# Patient Record
Sex: MALE | Race: WHITE | NOT HISPANIC OR LATINO | URBAN - METROPOLITAN AREA
[De-identification: names, ages, dates, MRNs, and addresses within clinical notes are randomized per-mention and may not be internally consistent; named-entity substitution may affect disease eponyms.]

---

## 2023-12-04 ENCOUNTER — EMERGENCY (EMERGENCY)
Facility: HOSPITAL | Age: 38
LOS: 1 days | Discharge: HOME | End: 2023-12-04
Attending: EMERGENCY MEDICINE | Admitting: EMERGENCY MEDICINE
Payer: COMMERCIAL

## 2023-12-04 VITALS
TEMPERATURE: 99 F | HEART RATE: 101 BPM | SYSTOLIC BLOOD PRESSURE: 131 MMHG | HEIGHT: 75 IN | OXYGEN SATURATION: 97 % | DIASTOLIC BLOOD PRESSURE: 90 MMHG | RESPIRATION RATE: 19 BRPM | WEIGHT: 225.09 LBS

## 2023-12-04 VITALS
DIASTOLIC BLOOD PRESSURE: 77 MMHG | SYSTOLIC BLOOD PRESSURE: 144 MMHG | OXYGEN SATURATION: 98 % | RESPIRATION RATE: 18 BRPM | TEMPERATURE: 98 F | HEART RATE: 77 BPM

## 2023-12-04 DIAGNOSIS — F43.22 ADJUSTMENT DISORDER WITH ANXIETY: ICD-10-CM

## 2023-12-04 LAB
ALBUMIN SERPL ELPH-MCNC: 4.2 G/DL — SIGNIFICANT CHANGE UP (ref 3.3–5)
ALBUMIN SERPL ELPH-MCNC: 4.2 G/DL — SIGNIFICANT CHANGE UP (ref 3.3–5)
ALP SERPL-CCNC: 95 U/L — SIGNIFICANT CHANGE UP (ref 30–120)
ALP SERPL-CCNC: 95 U/L — SIGNIFICANT CHANGE UP (ref 30–120)
ALT FLD-CCNC: 26 U/L — SIGNIFICANT CHANGE UP (ref 10–60)
ALT FLD-CCNC: 26 U/L — SIGNIFICANT CHANGE UP (ref 10–60)
AMPHET UR-MCNC: NEGATIVE — SIGNIFICANT CHANGE UP
AMPHET UR-MCNC: NEGATIVE — SIGNIFICANT CHANGE UP
ANION GAP SERPL CALC-SCNC: 10 MMOL/L — SIGNIFICANT CHANGE UP (ref 5–17)
ANION GAP SERPL CALC-SCNC: 10 MMOL/L — SIGNIFICANT CHANGE UP (ref 5–17)
AST SERPL-CCNC: 13 U/L — SIGNIFICANT CHANGE UP (ref 10–40)
AST SERPL-CCNC: 13 U/L — SIGNIFICANT CHANGE UP (ref 10–40)
BARBITURATES UR SCN-MCNC: NEGATIVE — SIGNIFICANT CHANGE UP
BARBITURATES UR SCN-MCNC: NEGATIVE — SIGNIFICANT CHANGE UP
BASOPHILS # BLD AUTO: 0.04 K/UL — SIGNIFICANT CHANGE UP (ref 0–0.2)
BASOPHILS # BLD AUTO: 0.04 K/UL — SIGNIFICANT CHANGE UP (ref 0–0.2)
BASOPHILS NFR BLD AUTO: 0.4 % — SIGNIFICANT CHANGE UP (ref 0–2)
BASOPHILS NFR BLD AUTO: 0.4 % — SIGNIFICANT CHANGE UP (ref 0–2)
BENZODIAZ UR-MCNC: NEGATIVE — SIGNIFICANT CHANGE UP
BENZODIAZ UR-MCNC: NEGATIVE — SIGNIFICANT CHANGE UP
BILIRUB SERPL-MCNC: 0.9 MG/DL — SIGNIFICANT CHANGE UP (ref 0.2–1.2)
BILIRUB SERPL-MCNC: 0.9 MG/DL — SIGNIFICANT CHANGE UP (ref 0.2–1.2)
BUN SERPL-MCNC: 18 MG/DL — SIGNIFICANT CHANGE UP (ref 7–23)
BUN SERPL-MCNC: 18 MG/DL — SIGNIFICANT CHANGE UP (ref 7–23)
CALCIUM SERPL-MCNC: 9.7 MG/DL — SIGNIFICANT CHANGE UP (ref 8.4–10.5)
CALCIUM SERPL-MCNC: 9.7 MG/DL — SIGNIFICANT CHANGE UP (ref 8.4–10.5)
CHLORIDE SERPL-SCNC: 100 MMOL/L — SIGNIFICANT CHANGE UP (ref 96–108)
CHLORIDE SERPL-SCNC: 100 MMOL/L — SIGNIFICANT CHANGE UP (ref 96–108)
CO2 SERPL-SCNC: 25 MMOL/L — SIGNIFICANT CHANGE UP (ref 22–31)
CO2 SERPL-SCNC: 25 MMOL/L — SIGNIFICANT CHANGE UP (ref 22–31)
COCAINE METAB.OTHER UR-MCNC: NEGATIVE — SIGNIFICANT CHANGE UP
COCAINE METAB.OTHER UR-MCNC: NEGATIVE — SIGNIFICANT CHANGE UP
CREAT SERPL-MCNC: 1.1 MG/DL — SIGNIFICANT CHANGE UP (ref 0.5–1.3)
CREAT SERPL-MCNC: 1.1 MG/DL — SIGNIFICANT CHANGE UP (ref 0.5–1.3)
EGFR: 89 ML/MIN/1.73M2 — SIGNIFICANT CHANGE UP
EGFR: 89 ML/MIN/1.73M2 — SIGNIFICANT CHANGE UP
EOSINOPHIL # BLD AUTO: 0.16 K/UL — SIGNIFICANT CHANGE UP (ref 0–0.5)
EOSINOPHIL # BLD AUTO: 0.16 K/UL — SIGNIFICANT CHANGE UP (ref 0–0.5)
EOSINOPHIL NFR BLD AUTO: 1.6 % — SIGNIFICANT CHANGE UP (ref 0–6)
EOSINOPHIL NFR BLD AUTO: 1.6 % — SIGNIFICANT CHANGE UP (ref 0–6)
ETHANOL SERPL-MCNC: <3 MG/DL — SIGNIFICANT CHANGE UP (ref 0–3)
ETHANOL SERPL-MCNC: <3 MG/DL — SIGNIFICANT CHANGE UP (ref 0–3)
GLUCOSE SERPL-MCNC: 117 MG/DL — HIGH (ref 70–99)
GLUCOSE SERPL-MCNC: 117 MG/DL — HIGH (ref 70–99)
HCT VFR BLD CALC: 45.9 % — SIGNIFICANT CHANGE UP (ref 39–50)
HCT VFR BLD CALC: 45.9 % — SIGNIFICANT CHANGE UP (ref 39–50)
HGB BLD-MCNC: 15.9 G/DL — SIGNIFICANT CHANGE UP (ref 13–17)
HGB BLD-MCNC: 15.9 G/DL — SIGNIFICANT CHANGE UP (ref 13–17)
IMM GRANULOCYTES NFR BLD AUTO: 0.2 % — SIGNIFICANT CHANGE UP (ref 0–0.9)
IMM GRANULOCYTES NFR BLD AUTO: 0.2 % — SIGNIFICANT CHANGE UP (ref 0–0.9)
LYMPHOCYTES # BLD AUTO: 2.32 K/UL — SIGNIFICANT CHANGE UP (ref 1–3.3)
LYMPHOCYTES # BLD AUTO: 2.32 K/UL — SIGNIFICANT CHANGE UP (ref 1–3.3)
LYMPHOCYTES # BLD AUTO: 23 % — SIGNIFICANT CHANGE UP (ref 13–44)
LYMPHOCYTES # BLD AUTO: 23 % — SIGNIFICANT CHANGE UP (ref 13–44)
MAGNESIUM SERPL-MCNC: 2.4 MG/DL — SIGNIFICANT CHANGE UP (ref 1.6–2.6)
MAGNESIUM SERPL-MCNC: 2.4 MG/DL — SIGNIFICANT CHANGE UP (ref 1.6–2.6)
MCHC RBC-ENTMCNC: 29.8 PG — SIGNIFICANT CHANGE UP (ref 27–34)
MCHC RBC-ENTMCNC: 29.8 PG — SIGNIFICANT CHANGE UP (ref 27–34)
MCHC RBC-ENTMCNC: 34.6 GM/DL — SIGNIFICANT CHANGE UP (ref 32–36)
MCHC RBC-ENTMCNC: 34.6 GM/DL — SIGNIFICANT CHANGE UP (ref 32–36)
MCV RBC AUTO: 86 FL — SIGNIFICANT CHANGE UP (ref 80–100)
MCV RBC AUTO: 86 FL — SIGNIFICANT CHANGE UP (ref 80–100)
METHADONE UR-MCNC: NEGATIVE — SIGNIFICANT CHANGE UP
METHADONE UR-MCNC: NEGATIVE — SIGNIFICANT CHANGE UP
MONOCYTES # BLD AUTO: 0.76 K/UL — SIGNIFICANT CHANGE UP (ref 0–0.9)
MONOCYTES # BLD AUTO: 0.76 K/UL — SIGNIFICANT CHANGE UP (ref 0–0.9)
MONOCYTES NFR BLD AUTO: 7.5 % — SIGNIFICANT CHANGE UP (ref 2–14)
MONOCYTES NFR BLD AUTO: 7.5 % — SIGNIFICANT CHANGE UP (ref 2–14)
NEUTROPHILS # BLD AUTO: 6.77 K/UL — SIGNIFICANT CHANGE UP (ref 1.8–7.4)
NEUTROPHILS # BLD AUTO: 6.77 K/UL — SIGNIFICANT CHANGE UP (ref 1.8–7.4)
NEUTROPHILS NFR BLD AUTO: 67.3 % — SIGNIFICANT CHANGE UP (ref 43–77)
NEUTROPHILS NFR BLD AUTO: 67.3 % — SIGNIFICANT CHANGE UP (ref 43–77)
NRBC # BLD: 0 /100 WBCS — SIGNIFICANT CHANGE UP (ref 0–0)
NRBC # BLD: 0 /100 WBCS — SIGNIFICANT CHANGE UP (ref 0–0)
OPIATES UR-MCNC: NEGATIVE — SIGNIFICANT CHANGE UP
OPIATES UR-MCNC: NEGATIVE — SIGNIFICANT CHANGE UP
PCP SPEC-MCNC: SIGNIFICANT CHANGE UP
PCP SPEC-MCNC: SIGNIFICANT CHANGE UP
PCP UR-MCNC: NEGATIVE — SIGNIFICANT CHANGE UP
PCP UR-MCNC: NEGATIVE — SIGNIFICANT CHANGE UP
PLATELET # BLD AUTO: 269 K/UL — SIGNIFICANT CHANGE UP (ref 150–400)
PLATELET # BLD AUTO: 269 K/UL — SIGNIFICANT CHANGE UP (ref 150–400)
POTASSIUM SERPL-MCNC: 3.8 MMOL/L — SIGNIFICANT CHANGE UP (ref 3.5–5.3)
POTASSIUM SERPL-MCNC: 3.8 MMOL/L — SIGNIFICANT CHANGE UP (ref 3.5–5.3)
POTASSIUM SERPL-SCNC: 3.8 MMOL/L — SIGNIFICANT CHANGE UP (ref 3.5–5.3)
POTASSIUM SERPL-SCNC: 3.8 MMOL/L — SIGNIFICANT CHANGE UP (ref 3.5–5.3)
PROT SERPL-MCNC: 8.1 G/DL — SIGNIFICANT CHANGE UP (ref 6–8.3)
PROT SERPL-MCNC: 8.1 G/DL — SIGNIFICANT CHANGE UP (ref 6–8.3)
RBC # BLD: 5.34 M/UL — SIGNIFICANT CHANGE UP (ref 4.2–5.8)
RBC # BLD: 5.34 M/UL — SIGNIFICANT CHANGE UP (ref 4.2–5.8)
RBC # FLD: 11.8 % — SIGNIFICANT CHANGE UP (ref 10.3–14.5)
RBC # FLD: 11.8 % — SIGNIFICANT CHANGE UP (ref 10.3–14.5)
SODIUM SERPL-SCNC: 135 MMOL/L — SIGNIFICANT CHANGE UP (ref 135–145)
SODIUM SERPL-SCNC: 135 MMOL/L — SIGNIFICANT CHANGE UP (ref 135–145)
THC UR QL: NEGATIVE — SIGNIFICANT CHANGE UP
THC UR QL: NEGATIVE — SIGNIFICANT CHANGE UP
WBC # BLD: 10.07 K/UL — SIGNIFICANT CHANGE UP (ref 3.8–10.5)
WBC # BLD: 10.07 K/UL — SIGNIFICANT CHANGE UP (ref 3.8–10.5)
WBC # FLD AUTO: 10.07 K/UL — SIGNIFICANT CHANGE UP (ref 3.8–10.5)
WBC # FLD AUTO: 10.07 K/UL — SIGNIFICANT CHANGE UP (ref 3.8–10.5)

## 2023-12-04 PROCEDURE — 36415 COLL VENOUS BLD VENIPUNCTURE: CPT

## 2023-12-04 PROCEDURE — 90792 PSYCH DIAG EVAL W/MED SRVCS: CPT | Mod: 95

## 2023-12-04 PROCEDURE — 99285 EMERGENCY DEPT VISIT HI MDM: CPT

## 2023-12-04 PROCEDURE — 93005 ELECTROCARDIOGRAM TRACING: CPT

## 2023-12-04 PROCEDURE — 70450 CT HEAD/BRAIN W/O DYE: CPT | Mod: MA

## 2023-12-04 PROCEDURE — 80307 DRUG TEST PRSMV CHEM ANLYZR: CPT

## 2023-12-04 PROCEDURE — 80053 COMPREHEN METABOLIC PANEL: CPT

## 2023-12-04 PROCEDURE — 93010 ELECTROCARDIOGRAM REPORT: CPT

## 2023-12-04 PROCEDURE — 85025 COMPLETE CBC W/AUTO DIFF WBC: CPT

## 2023-12-04 PROCEDURE — 83735 ASSAY OF MAGNESIUM: CPT

## 2023-12-04 PROCEDURE — 70450 CT HEAD/BRAIN W/O DYE: CPT | Mod: 26,MA

## 2023-12-04 PROCEDURE — 99285 EMERGENCY DEPT VISIT HI MDM: CPT | Mod: 25

## 2023-12-04 RX ORDER — SODIUM CHLORIDE 9 MG/ML
1000 INJECTION INTRAMUSCULAR; INTRAVENOUS; SUBCUTANEOUS ONCE
Refills: 0 | Status: COMPLETED | OUTPATIENT
Start: 2023-12-04 | End: 2023-12-04

## 2023-12-04 RX ADMIN — SODIUM CHLORIDE 1000 MILLILITER(S): 9 INJECTION INTRAMUSCULAR; INTRAVENOUS; SUBCUTANEOUS at 18:34

## 2023-12-04 NOTE — ED PROVIDER NOTE - OBJECTIVE STATEMENT
38-year-old male past medical history of hydrocephalus presents to the ED with complaint of feeling restless in his bilateral legs x1 week.  Patient reports he was on Xanax 0.25 mg which he stopped 2 to 3 weeks ago, reports he was switched to sertraline.  Patient also reports a history of alcohol abuse, reports he relapsed and drank thanksgiving weekend, reports his last drink was 1 week ago.   He denies any headache, dizziness, nausea vomiting, fever chills, abdominal pain, dizziness or all other complaints.  Patient is also concerned that his hydrocephalus may be reoccurring and is requesting a CAT scan to have it checked.

## 2023-12-04 NOTE — ED PROVIDER NOTE - PHYSICAL EXAMINATION
Gen: Well appearing in NAD.   ENT: oral mucosa moist   Head: atraumatic  Heart: s1/s2, RRR  Lung: CTA b/l,   Abd: soft, NT/ND, no rebound or guarding,   Msk: no pedal edema or calf pain   Neuro: AAO x3, patient moving all extremity equally, no focal neuro deficits noted. Pt ambulatory with normal gait in the ED  Skin: Normal for race.   Psych: Alert and oriented

## 2023-12-04 NOTE — ED BEHAVIORAL HEALTH ASSESSMENT NOTE - SUMMARY
37 yo male, currently domiciled at home with father, currently employed with [employer] of ADHD, anxiety and pmh of hydrocephalus s/p surgery in 2006 that presented due to reported changes in his body in the past 6 weeks. To note patient has no IP hospitalization, no previous SA, no previous self harm, no legal/violence hx, substance use hx (alcohol)     Patient presents for shakiness and reported concern for withdrawal. Patient VS with slight elevation of HR and BP upon arrival but HR decreased at last check. Additional CTH shows concern for ventricular enlargement and neurosurgery recommended outpatient follow up. Patient has Xanax prescription of 30 pills and last use was about 2-3 week ago. Patients dose of Zoloft recently increased and additionally patient has alcohol use disorder with last use reported about 10 days ago. Patient also reported anxiety and depressive sx. At this time patient’s current presentation can be likely multifactorial; unlikely xanax withdrawal as has been over a week since last use and small prescription; unlikely alcohol withdrawal if last drink was 10 days ago as reported; more likely causes including jitteriness with starting of new medication of Zoloft while stopping Seroquel and addition ventricular enlargement noted (patient reports similar sx when he previously had hydrocephalus). Will defer to ED for any concern and treatment of withdrawal. Patient denies SI/HI/AVH. Patient’s collateral denies safety concerns. Patient is future orientated and identifies reasons for living.      Although Alex is at chronic risk for self harm, impulsivity, and aggression due to medical condition, hx of alcohol abuse he is not at acute risk for harm to self and others at time of evaluation. Patient adamantly denies suicidal and homicidal ideations, intent, or plan. Patient did not exhibit any self-injurious behaviors or behavioral disturbances during evaluation and was calm, cooperative, and appropriate throughout the interview. Patient was not overtly psychotic, manic on exam. Patient offered voluntary psychiatric hospitalization and patient declined. Additionally, patient was able to engage in meaningful safety planning. Risk is mitigated by creation of safety plan, patient to be closely watched by family, close follow up with therapist/psychiatrist/neurosurgery,  and patient given return precautions of coming to the ER/call 911 if worsening symptoms.           Protective factors include: future-orientation, identifying reasons for living, positive coping mechanisms, housing, some social supports, and connection to outpatient mental health services, help seeking behavior, lack of specific plan, no report of previous suicide attempts or SIB, no reported access to guns lack of psychotic symptoms, good communication skills, ability to advocate for self and express needs. 39 yo male, currently domiciled at home with father, currently employed with [employer] of ADHD, anxiety and pmh of hydrocephalus s/p surgery in 2006 that presented due to reported changes in his body in the past 6 weeks. To note patient has no IP hospitalization, no previous SA, no previous self harm, no legal/violence hx, substance use hx (alcohol)     Patient presents for shakiness and reported concern for withdrawal. Patient VS with slight elevation of HR and BP upon arrival but HR decreased at last check. Additional CTH shows concern for ventricular enlargement and neurosurgery recommended outpatient follow up. Patient has Xanax prescription of 30 pills and last use was about 2-3 week ago. Patients dose of Zoloft recently increased and additionally patient has alcohol use disorder with last use reported about 10 days ago. Patient also reported anxiety and depressive sx. At this time patient’s current presentation can be likely multifactorial; unlikely xanax withdrawal as has been over a week since last use and small prescription; unlikely alcohol withdrawal if last drink was 10 days ago as reported; more likely causes including jitteriness with starting of new medication of Zoloft while stopping Seroquel and addition ventricular enlargement noted (patient reports similar sx when he previously had hydrocephalus). Will defer to ED for any concern and treatment of withdrawal. Patient denies SI/HI/AVH. Patient’s collateral denies safety concerns. Patient is future orientated and identifies reasons for living.      Although Alex is at chronic risk for self harm, impulsivity, and aggression due to medical condition, hx of alcohol abuse he is not at acute risk for harm to self and others at time of evaluation. Patient adamantly denies suicidal and homicidal ideations, intent, or plan. Patient did not exhibit any self-injurious behaviors or behavioral disturbances during evaluation and was calm, cooperative, and appropriate throughout the interview. Patient was not overtly psychotic, manic on exam. Patient offered voluntary psychiatric hospitalization and patient declined. Additionally, patient was able to engage in meaningful safety planning. Risk is mitigated by creation of safety plan, patient to be closely watched by family, close follow up with therapist/psychiatrist/neurosurgery,  and patient given return precautions of coming to the ER/call 911 if worsening symptoms.           Protective factors include: future-orientation, identifying reasons for living, positive coping mechanisms, housing, some social supports, and connection to outpatient mental health services, help seeking behavior, lack of specific plan, no report of previous suicide attempts or SIB, no reported access to guns lack of psychotic symptoms, good communication skills, ability to advocate for self and express needs.

## 2023-12-04 NOTE — ED PROVIDER NOTE - NSFOLLOWUPINSTRUCTIONS_ED_ALL_ED_FT
follow up with your out patient psychiatrist and therapist tomorrow  Return to the hospital for any changes in your condition or concerns

## 2023-12-04 NOTE — ED PROVIDER NOTE - PROGRESS NOTE DETAILS
MARTINA Crow: CT results reviewed- pt Is neurologically intact ambulatory in the ED with normal gait.  Spoke with neurosurgeon Dr. Hawley–see MDM section. Seen by Tele-psych and safe to go home, the patient  has psychiatrist and therapist to follow with O/P

## 2023-12-04 NOTE — ED PROVIDER NOTE - CARE PROVIDERS DIRECT ADDRESSES
[email or web address]

## 2023-12-04 NOTE — ED BEHAVIORAL HEALTH NOTE - BEHAVIORAL HEALTH NOTE
==================             PRE-HOSPITAL COURSE             ===================            SOURCE:  Secondhand EMR documentation.             DETAILS:  Patient BIBfather to ED; chief complaint of substance abuse disorder/SI.           ===========      ED COURSE:            ===========             SOURCE:  RN and secondhand EMR documentation.              ARRIVAL:  Patient noted to be cooperative with ED protocol. Patient gowned, wanded, and placed in private room on 1:1 for consult. Patient presents with good hygiene/grooming.              BELONGINGS:  None notable.             BEHAVIOR: Blood/urine provided for routine labs without noted incident. No SI/HI/AH/VH elicited per RN. Patient is alert, orientedx4, and makes eye contact; speech of normal volume and rate accompanied by logical thought process. Patient has been in hospital bed while in ED; presently observed to be resting in bed.     TREATMENT: Patient has not required medication intervention while in ED; remains in behavioral control for duration of ED stay.      Visitors: Patient presently accompanied by father while in ED; remains appropriate with patient while in ED.

## 2023-12-04 NOTE — ED ADULT TRIAGE NOTE - CHIEF COMPLAINT QUOTE
37 y/o male presents aox4 ambulatory c/o restlessness and upper extremity shakes after he stopped taking his prescribed xanax x1 week.

## 2023-12-04 NOTE — ED BEHAVIORAL HEALTH ASSESSMENT NOTE - NSBHSAALC_PSY_A_CORE FT
reports last use on Thanksgiving 2023 reports last use on Thanksgiving 2023; detox when younger; uses AA and plans to continue to not drink

## 2023-12-04 NOTE — ED BEHAVIORAL HEALTH ASSESSMENT NOTE - NSBHATTESTBILLING_PSY_A_CORE
58675-Ptibkblewnx diagnostic evaluation with medical services [id number]-Ixilebwzlse diagnostic evaluation with medical services

## 2023-12-04 NOTE — ED PROVIDER NOTE - CARE PROVIDER_API CALL
Michael Quigley  Neurosurgery  [address] [place] [id number]  Phone: [phone]  Fax: [phone]  Follow Up Time:    Michael Quigley  Neurosurgery  [address] [place] [id number]  Phone: [phone]  Fax: [phone]  Follow Up Time:

## 2023-12-04 NOTE — ED ADULT TRIAGE NOTE - ISOLATION TYPE:
None [Follow-Up: _____] : a [unfilled] follow-up visit [FreeTextEntry1] : Hypothryiodism Low Vitamin D Low B12

## 2023-12-04 NOTE — ED BEHAVIORAL HEALTH ASSESSMENT NOTE - OTHER PAST PSYCHIATRIC HISTORY (INCLUDE DETAILS REGARDING ONSET, COURSE OF ILLNESS, INPATIENT/OUTPATIENT TREATMENT)
Previous Dx: ADHD, Asnxiety     Previous Med Trials:Xanax, zoloft, Seroquel     Previous IP treatment: denies     Previous SA: denies     Self harming: denies     Current MH treatment: Therapist Sammie MENENDEZ every saturday; psychiatrist recently seen     Violence/Legal: denies

## 2023-12-04 NOTE — ED BEHAVIORAL HEALTH NOTE - BEHAVIORAL HEALTH NOTE
Patient gives permission to obtain collateral from father Johnson:  (  ) Yes  ( x )  No  Rationale for overriding objection            (  ) Lack of capacity. Details: ________            ( x ) Assessing risk of danger to self/others. Details: Pt is endorsing SI, need to assess for safety.  Rationale for selecting specific collateral source            (  ) Potential to impact risk of danger to self/others and no alternative equivalent. Details: _____    Collateral (father Johnson) has requested that the information provided remain confidential: Yes [  ] No [ x ]  Collateral (father Johnson) has provided information that patient is/may be unaware of: Yes [  ] No [ x ]       FOR EACH PERSON  •	NAME: Alex  •	NUMBER: [phone]  •	RELATIONSHIP: Father   •	RELIABILITY: Reliable but limited  •	COMMENTS: Father reports no safety concerns. Father reports he does not have any psychiatric concerns during this ED visit. Father reports he drives pt to his weekly outpatient psychiatric appointments.      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      HPI (SEE TIMELINE ABOVE)  •	BASELINE FUNCTIONING: able to care for self, happy-go-fiona, goes to work, socializes with coworkers  •	DATE HPI STARTED: 12/3  •	DECOMPENSATION: Father reports he brought pt to the ED today because pt was reporting encephalitis sx similar to when he had it at 20 yo. Father reports pt endorsed weakness in bilateral legs and difficulty walking. Father reports possible recent stressors are: change in employment from working at [employer] with coworkers he is familiar with to working the night shift and adjusting to a new environment and coworkers. Father reports no noticeable change in mood, sleep, or appetite. Father reports pt recently relapsed on alcohol 2 weeks ago but has not drank since last week.   •	SUICIDALITY: Denies   •	VIOLENCE: Denies  •	SUBSTANCE: ETOH    PAST PSYCHIATRIC HISTORY    •	DATE PAST PSYCHIATRIC HISTORY STARTED: Unknown   •	MAIN PSYCHIATRIC DIAGNOSIS: Unknown   •	PSYCHIATRIC HOSPITALIZATIONS: No known hospitalizations  •	PRIOR ILLNESS: Father reports pt is in outpatient care with a therapist, in person weekly sessions on Saturday. Father is unaware of provider’s name or number.   •	SUICIDALITY: Denies   •	VIOLENCE: Denies  •	SUBSTANCE USE: ETOH    OTHER HISTORY  •	CURRENT MEDICATION: No known medication   •	MEDICAL HISTORY: encephalitis  •	ALLERGIES: NKA  •	LEGAL ISSUES: None known.   •	FIREARM ACCESS: Denies   •	SOCIAL HISTORY: None known   •	FAMILY HISTORY: ETOH abuse – father Patient gives permission to obtain collateral from father Johnson:  (  ) Yes  ( x )  No  Rationale for overriding objection            (  ) Lack of capacity. Details: ________            ( x ) Assessing risk of danger to self/others. Details: Pt is endorsing SI, need to assess for safety.  Rationale for selecting specific collateral source            (  ) Potential to impact risk of danger to self/others and no alternative equivalent. Details: _____    Collateral (father Johnson) has requested that the information provided remain confidential: Yes [  ] No [ x ]  Collateral (father Johnson) has provided information that patient is/may be unaware of: Yes [  ] No [ x ]       FOR EACH PERSON  •	NAME: Alex  •	NUMBER: [phone]  •	RELATIONSHIP: Father   •	RELIABILITY: Reliable but limited  •	COMMENTS: Father reports no safety concerns. Father reports he does not have any psychiatric concerns during this ED visit. Father reports he drives pt to his weekly outpatient psychiatric appointments.      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      HPI (SEE TIMELINE ABOVE)  •	BASELINE FUNCTIONING: able to care for self, happy-go-fiona, goes to work, socializes with coworkers  •	DATE HPI STARTED: 12/3  •	DECOMPENSATION: Father reports he brought pt to the ED today because pt was reporting encephalitis sx similar to when he had it at 18 yo. Father reports pt endorsed weakness in bilateral legs and difficulty walking. Father reports possible recent stressors are: change in employment from working at [employer] with coworkers he is familiar with to working the night shift and adjusting to a new environment and coworkers. Father reports no noticeable change in mood, sleep, or appetite. Father reports pt recently relapsed on alcohol 2 weeks ago but has not drank since last week.   •	SUICIDALITY: Denies   •	VIOLENCE: Denies  •	SUBSTANCE: ETOH    PAST PSYCHIATRIC HISTORY    •	DATE PAST PSYCHIATRIC HISTORY STARTED: Unknown   •	MAIN PSYCHIATRIC DIAGNOSIS: Unknown   •	PSYCHIATRIC HOSPITALIZATIONS: No known hospitalizations  •	PRIOR ILLNESS: Father reports pt is in outpatient care with a therapist, in person weekly sessions on Saturday. Father is unaware of provider’s name or number.   •	SUICIDALITY: Denies   •	VIOLENCE: Denies  •	SUBSTANCE USE: ETOH    OTHER HISTORY  •	CURRENT MEDICATION: No known medication   •	MEDICAL HISTORY: encephalitis  •	ALLERGIES: NKA  •	LEGAL ISSUES: None known.   •	FIREARM ACCESS: Denies   •	SOCIAL HISTORY: None known   •	FAMILY HISTORY: ETOH abuse – father

## 2023-12-04 NOTE — ED BEHAVIORAL HEALTH ASSESSMENT NOTE - SAFETY PLAN ADDT'L DETAILS
Safety plan discussed with.../Education provided regarding environmental safety / lethal means restriction/Provision of National Suicide Prevention Lifeline 5-329-195-MIQE (7443) Safety plan discussed with.../Education provided regarding environmental safety / lethal means restriction/Provision of National Suicide Prevention Lifeline 8-096-769-JKMR (1869)

## 2023-12-04 NOTE — ED PROVIDER NOTE - CLINICAL SUMMARY MEDICAL DECISION MAKING FREE TEXT BOX
Patient complaining of feeling restless especially in his legs for the past week.  Patient relates he used to take Xanax as needed and he started taking it regularly then stopped approximately 1-2 weeks ago, also has relapsed with alcohol abuse.  Patient reports recent medication change, stopped Seroquel started sertraline.  Patient also reports he has increased depression from himself recently.  Patient denies fevers chills headache nausea vomiting abdominal pain.    Plan EKG CT head labs IV fluids telepsychiatry consult

## 2023-12-04 NOTE — ED PROVIDER NOTE - CONSULTANT FREE TEXT FOR MDM DISCUSSED CASE WITH QUESTION
Spoke with neurosurgery Dr. Hawley, He reviewed the CAT scan images, reports the hydrocephalus reported on CT does not require emergent neurosurgical evaluation patient can follow-up outpatient with a neurosurgeon whom does  shunts.  He recommended Dr. Michael Quigley.

## 2023-12-04 NOTE — ED BEHAVIORAL HEALTH ASSESSMENT NOTE - NS ED BHA TELEPSYCH PROVIDER LOCATION
[address] [facility], [place], [place] [address] [facility], [place]

## 2023-12-04 NOTE — ED BEHAVIORAL HEALTH ASSESSMENT NOTE - DESCRIPTION
see BH note lives with father; works in snf care; has a girlfriend; reports good relationship with family lives with father; works in halfway care; has a girlfriend; reports good relationship with family hydrocephalus s/p surgery in 2006

## 2023-12-04 NOTE — ED PROVIDER NOTE - PATIENT PORTAL LINK FT
You can access the FollowMyHealth Patient Portal offered by [facility] by registering at the following website: [email or web address]. By joining [employer]’s FollowMyHealth portal, you will also be able to view your health information using other applications (apps) compatible with our system. You can access the FollowMyHealth Patient Portal offered by [facility] by registering at the following website: [email or web address]. By joining [employer]’s FollowMyHealth portal, you will also be able to view your health information using other applications (apps) compatible with our system.

## 2023-12-04 NOTE — ED ADULT NURSE NOTE - NSFALLUNIVINTERV_ED_ALL_ED
Bed/Stretcher in lowest position, wheels locked, appropriate side rails in place/Call bell, personal items and telephone in reach/Instruct patient to call for assistance before getting out of bed/chair/stretcher/Non-slip footwear applied when patient is off stretcher/[place] to call system/Physically safe environment - no spills, clutter or unnecessary equipment/Purposeful proactive rounding/Room/bathroom lighting operational, light cord in reach Bed/Stretcher in lowest position, wheels locked, appropriate side rails in place/Call bell, personal items and telephone in reach/Instruct patient to call for assistance before getting out of bed/chair/stretcher/Non-slip footwear applied when patient is off stretcher/[place] to call system/Physically safe environment - no spills, clutter or unnecessary equipment/Purposeful proactive rounding/Room/bathroom lighting operational, light cord in reach

## 2023-12-04 NOTE — BH SAFETY PLAN - SUICIDE PREVENTION LIFELINE PHONES
Suicide Prevention Lifeline Phone: [phone]- TALK ([phone]) Suicide Prevention Lifeline Phone: 8-211-559- TALK (5550)

## 2023-12-04 NOTE — ED BEHAVIORAL HEALTH ASSESSMENT NOTE - HPI (INCLUDE ILLNESS QUALITY, SEVERITY, DURATION, TIMING, CONTEXT, MODIFYING FACTORS, ASSOCIATED SIGNS AND SYMPTOMS)
39 yo male, currently domiciled at home with father, currently employed with [employer] of ADHD, anxiety and pmh of hydrocephalus s/p surgery in 2006 that presented due to reported changes in his body in the past 6 weeks. To note patient has no IP hospitalization, no previous SA, no previous self harm, no legal/violence hx, substance use hx (alcohol)     On interview, patient states he feels better knowing his current changes in CT match some of his previous sx. He states he was worried about some of changes in his body including not being able to control his limbs and spasms. He states he has had them for about 6 weeks and previous when he was a child that resolved with surgery in 2006. He states he thinks most of the changes to his eating and sleeping and energy are due to this. He denies SI. Denies HI/AVH. He states he was seeing a new psychiatrist and felt more depressed recently but now believes that is due to his medical condition. He states he likes working out and has a good relationship with family. He states he got a new job which is better but he is still working to do better at it. Reports seeing therapist every Saturday and psychiatrist recently. Reports he will follow up with both ASAP and neurosurgery. Reports zoloft increased to 100mg a few days ago. Reports stopped seroquel 5 months ago. Reports last Xanax was a couple weeks ago and max 1 dose prior to work a day. Reports last drink was day after thanksgiving. Denies SI/HI/AVH. Wishes to go home. Completes safety plan. Verbally consents to collateral from father.     See  note for collateral from father.

## 2024-01-04 ENCOUNTER — EMERGENCY (EMERGENCY)
Facility: HOSPITAL | Age: 39
LOS: 1 days | Discharge: HOME | End: 2024-01-04
Attending: EMERGENCY MEDICINE | Admitting: EMERGENCY MEDICINE
Payer: COMMERCIAL

## 2024-01-04 VITALS
RESPIRATION RATE: 18 BRPM | SYSTOLIC BLOOD PRESSURE: 157 MMHG | HEIGHT: 75 IN | HEART RATE: 94 BPM | DIASTOLIC BLOOD PRESSURE: 100 MMHG | WEIGHT: 233.69 LBS | OXYGEN SATURATION: 98 % | TEMPERATURE: 99 F

## 2024-01-04 VITALS
RESPIRATION RATE: 16 BRPM | OXYGEN SATURATION: 97 % | DIASTOLIC BLOOD PRESSURE: 79 MMHG | SYSTOLIC BLOOD PRESSURE: 122 MMHG | HEART RATE: 72 BPM | TEMPERATURE: 98 F

## 2024-01-04 LAB
ALBUMIN SERPL ELPH-MCNC: 3.9 G/DL — SIGNIFICANT CHANGE UP (ref 3.3–5)
ALBUMIN SERPL ELPH-MCNC: 3.9 G/DL — SIGNIFICANT CHANGE UP (ref 3.3–5)
ALP SERPL-CCNC: 79 U/L — SIGNIFICANT CHANGE UP (ref 30–120)
ALP SERPL-CCNC: 79 U/L — SIGNIFICANT CHANGE UP (ref 30–120)
ALT FLD-CCNC: 32 U/L — SIGNIFICANT CHANGE UP (ref 10–60)
ALT FLD-CCNC: 32 U/L — SIGNIFICANT CHANGE UP (ref 10–60)
AMPHET UR-MCNC: NEGATIVE — SIGNIFICANT CHANGE UP
AMPHET UR-MCNC: NEGATIVE — SIGNIFICANT CHANGE UP
ANION GAP SERPL CALC-SCNC: 10 MMOL/L — SIGNIFICANT CHANGE UP (ref 5–17)
ANION GAP SERPL CALC-SCNC: 10 MMOL/L — SIGNIFICANT CHANGE UP (ref 5–17)
APAP SERPL-MCNC: <1 UG/ML — LOW (ref 10–30)
APAP SERPL-MCNC: <1 UG/ML — LOW (ref 10–30)
AST SERPL-CCNC: 20 U/L — SIGNIFICANT CHANGE UP (ref 10–40)
AST SERPL-CCNC: 20 U/L — SIGNIFICANT CHANGE UP (ref 10–40)
BARBITURATES UR SCN-MCNC: NEGATIVE — SIGNIFICANT CHANGE UP
BARBITURATES UR SCN-MCNC: NEGATIVE — SIGNIFICANT CHANGE UP
BASOPHILS # BLD AUTO: 0.05 K/UL — SIGNIFICANT CHANGE UP (ref 0–0.2)
BASOPHILS # BLD AUTO: 0.05 K/UL — SIGNIFICANT CHANGE UP (ref 0–0.2)
BASOPHILS NFR BLD AUTO: 0.6 % — SIGNIFICANT CHANGE UP (ref 0–2)
BASOPHILS NFR BLD AUTO: 0.6 % — SIGNIFICANT CHANGE UP (ref 0–2)
BENZODIAZ UR-MCNC: NEGATIVE — SIGNIFICANT CHANGE UP
BENZODIAZ UR-MCNC: NEGATIVE — SIGNIFICANT CHANGE UP
BILIRUB SERPL-MCNC: 2.9 MG/DL — HIGH (ref 0.2–1.2)
BILIRUB SERPL-MCNC: 2.9 MG/DL — HIGH (ref 0.2–1.2)
BUN SERPL-MCNC: 12 MG/DL — SIGNIFICANT CHANGE UP (ref 7–23)
BUN SERPL-MCNC: 12 MG/DL — SIGNIFICANT CHANGE UP (ref 7–23)
CALCIUM SERPL-MCNC: 9.2 MG/DL — SIGNIFICANT CHANGE UP (ref 8.4–10.5)
CALCIUM SERPL-MCNC: 9.2 MG/DL — SIGNIFICANT CHANGE UP (ref 8.4–10.5)
CHLORIDE SERPL-SCNC: 103 MMOL/L — SIGNIFICANT CHANGE UP (ref 96–108)
CHLORIDE SERPL-SCNC: 103 MMOL/L — SIGNIFICANT CHANGE UP (ref 96–108)
CO2 SERPL-SCNC: 25 MMOL/L — SIGNIFICANT CHANGE UP (ref 22–31)
CO2 SERPL-SCNC: 25 MMOL/L — SIGNIFICANT CHANGE UP (ref 22–31)
COCAINE METAB.OTHER UR-MCNC: NEGATIVE — SIGNIFICANT CHANGE UP
COCAINE METAB.OTHER UR-MCNC: NEGATIVE — SIGNIFICANT CHANGE UP
CREAT SERPL-MCNC: 1.03 MG/DL — SIGNIFICANT CHANGE UP (ref 0.5–1.3)
CREAT SERPL-MCNC: 1.03 MG/DL — SIGNIFICANT CHANGE UP (ref 0.5–1.3)
EGFR: 97 ML/MIN/1.73M2 — SIGNIFICANT CHANGE UP
EGFR: 97 ML/MIN/1.73M2 — SIGNIFICANT CHANGE UP
EOSINOPHIL # BLD AUTO: 0.12 K/UL — SIGNIFICANT CHANGE UP (ref 0–0.5)
EOSINOPHIL # BLD AUTO: 0.12 K/UL — SIGNIFICANT CHANGE UP (ref 0–0.5)
EOSINOPHIL NFR BLD AUTO: 1.5 % — SIGNIFICANT CHANGE UP (ref 0–6)
EOSINOPHIL NFR BLD AUTO: 1.5 % — SIGNIFICANT CHANGE UP (ref 0–6)
ETHANOL SERPL-MCNC: <3 MG/DL — SIGNIFICANT CHANGE UP (ref 0–3)
ETHANOL SERPL-MCNC: <3 MG/DL — SIGNIFICANT CHANGE UP (ref 0–3)
GLUCOSE SERPL-MCNC: 120 MG/DL — HIGH (ref 70–99)
GLUCOSE SERPL-MCNC: 120 MG/DL — HIGH (ref 70–99)
HCT VFR BLD CALC: 42.3 % — SIGNIFICANT CHANGE UP (ref 39–50)
HCT VFR BLD CALC: 42.3 % — SIGNIFICANT CHANGE UP (ref 39–50)
HGB BLD-MCNC: 14.4 G/DL — SIGNIFICANT CHANGE UP (ref 13–17)
HGB BLD-MCNC: 14.4 G/DL — SIGNIFICANT CHANGE UP (ref 13–17)
IMM GRANULOCYTES NFR BLD AUTO: 0.4 % — SIGNIFICANT CHANGE UP (ref 0–0.9)
IMM GRANULOCYTES NFR BLD AUTO: 0.4 % — SIGNIFICANT CHANGE UP (ref 0–0.9)
LYMPHOCYTES # BLD AUTO: 2.11 K/UL — SIGNIFICANT CHANGE UP (ref 1–3.3)
LYMPHOCYTES # BLD AUTO: 2.11 K/UL — SIGNIFICANT CHANGE UP (ref 1–3.3)
LYMPHOCYTES # BLD AUTO: 26.5 % — SIGNIFICANT CHANGE UP (ref 13–44)
LYMPHOCYTES # BLD AUTO: 26.5 % — SIGNIFICANT CHANGE UP (ref 13–44)
MCHC RBC-ENTMCNC: 29.7 PG — SIGNIFICANT CHANGE UP (ref 27–34)
MCHC RBC-ENTMCNC: 29.7 PG — SIGNIFICANT CHANGE UP (ref 27–34)
MCHC RBC-ENTMCNC: 34 GM/DL — SIGNIFICANT CHANGE UP (ref 32–36)
MCHC RBC-ENTMCNC: 34 GM/DL — SIGNIFICANT CHANGE UP (ref 32–36)
MCV RBC AUTO: 87.2 FL — SIGNIFICANT CHANGE UP (ref 80–100)
MCV RBC AUTO: 87.2 FL — SIGNIFICANT CHANGE UP (ref 80–100)
METHADONE UR-MCNC: NEGATIVE — SIGNIFICANT CHANGE UP
METHADONE UR-MCNC: NEGATIVE — SIGNIFICANT CHANGE UP
MONOCYTES # BLD AUTO: 0.78 K/UL — SIGNIFICANT CHANGE UP (ref 0–0.9)
MONOCYTES # BLD AUTO: 0.78 K/UL — SIGNIFICANT CHANGE UP (ref 0–0.9)
MONOCYTES NFR BLD AUTO: 9.8 % — SIGNIFICANT CHANGE UP (ref 2–14)
MONOCYTES NFR BLD AUTO: 9.8 % — SIGNIFICANT CHANGE UP (ref 2–14)
NEUTROPHILS # BLD AUTO: 4.86 K/UL — SIGNIFICANT CHANGE UP (ref 1.8–7.4)
NEUTROPHILS # BLD AUTO: 4.86 K/UL — SIGNIFICANT CHANGE UP (ref 1.8–7.4)
NEUTROPHILS NFR BLD AUTO: 61.2 % — SIGNIFICANT CHANGE UP (ref 43–77)
NEUTROPHILS NFR BLD AUTO: 61.2 % — SIGNIFICANT CHANGE UP (ref 43–77)
NRBC # BLD: 0 /100 WBCS — SIGNIFICANT CHANGE UP (ref 0–0)
NRBC # BLD: 0 /100 WBCS — SIGNIFICANT CHANGE UP (ref 0–0)
OPIATES UR-MCNC: NEGATIVE — SIGNIFICANT CHANGE UP
OPIATES UR-MCNC: NEGATIVE — SIGNIFICANT CHANGE UP
PCP SPEC-MCNC: SIGNIFICANT CHANGE UP
PCP SPEC-MCNC: SIGNIFICANT CHANGE UP
PCP UR-MCNC: NEGATIVE — SIGNIFICANT CHANGE UP
PCP UR-MCNC: NEGATIVE — SIGNIFICANT CHANGE UP
PLATELET # BLD AUTO: 215 K/UL — SIGNIFICANT CHANGE UP (ref 150–400)
PLATELET # BLD AUTO: 215 K/UL — SIGNIFICANT CHANGE UP (ref 150–400)
POTASSIUM SERPL-MCNC: 3.3 MMOL/L — LOW (ref 3.5–5.3)
POTASSIUM SERPL-MCNC: 3.3 MMOL/L — LOW (ref 3.5–5.3)
POTASSIUM SERPL-SCNC: 3.3 MMOL/L — LOW (ref 3.5–5.3)
POTASSIUM SERPL-SCNC: 3.3 MMOL/L — LOW (ref 3.5–5.3)
PROT SERPL-MCNC: 7.4 G/DL — SIGNIFICANT CHANGE UP (ref 6–8.3)
PROT SERPL-MCNC: 7.4 G/DL — SIGNIFICANT CHANGE UP (ref 6–8.3)
RBC # BLD: 4.85 M/UL — SIGNIFICANT CHANGE UP (ref 4.2–5.8)
RBC # BLD: 4.85 M/UL — SIGNIFICANT CHANGE UP (ref 4.2–5.8)
RBC # FLD: 11.9 % — SIGNIFICANT CHANGE UP (ref 10.3–14.5)
RBC # FLD: 11.9 % — SIGNIFICANT CHANGE UP (ref 10.3–14.5)
SALICYLATES SERPL-MCNC: <0.2 MG/DL — LOW (ref 2.8–20)
SALICYLATES SERPL-MCNC: <0.2 MG/DL — LOW (ref 2.8–20)
SARS-COV-2 RNA SPEC QL NAA+PROBE: SIGNIFICANT CHANGE UP
SARS-COV-2 RNA SPEC QL NAA+PROBE: SIGNIFICANT CHANGE UP
SODIUM SERPL-SCNC: 138 MMOL/L — SIGNIFICANT CHANGE UP (ref 135–145)
SODIUM SERPL-SCNC: 138 MMOL/L — SIGNIFICANT CHANGE UP (ref 135–145)
THC UR QL: NEGATIVE — SIGNIFICANT CHANGE UP
THC UR QL: NEGATIVE — SIGNIFICANT CHANGE UP
WBC # BLD: 7.95 K/UL — SIGNIFICANT CHANGE UP (ref 3.8–10.5)
WBC # BLD: 7.95 K/UL — SIGNIFICANT CHANGE UP (ref 3.8–10.5)
WBC # FLD AUTO: 7.95 K/UL — SIGNIFICANT CHANGE UP (ref 3.8–10.5)
WBC # FLD AUTO: 7.95 K/UL — SIGNIFICANT CHANGE UP (ref 3.8–10.5)

## 2024-01-04 PROCEDURE — 93010 ELECTROCARDIOGRAM REPORT: CPT

## 2024-01-04 PROCEDURE — 99285 EMERGENCY DEPT VISIT HI MDM: CPT

## 2024-01-04 PROCEDURE — 93005 ELECTROCARDIOGRAM TRACING: CPT

## 2024-01-04 PROCEDURE — 36415 COLL VENOUS BLD VENIPUNCTURE: CPT

## 2024-01-04 PROCEDURE — 80053 COMPREHEN METABOLIC PANEL: CPT

## 2024-01-04 PROCEDURE — 87635 SARS-COV-2 COVID-19 AMP PRB: CPT

## 2024-01-04 PROCEDURE — 85025 COMPLETE CBC W/AUTO DIFF WBC: CPT

## 2024-01-04 PROCEDURE — 80307 DRUG TEST PRSMV CHEM ANLYZR: CPT

## 2024-01-04 RX ORDER — POTASSIUM CHLORIDE 20 MEQ
20 PACKET (EA) ORAL ONCE
Refills: 0 | Status: COMPLETED | OUTPATIENT
Start: 2024-01-04 | End: 2024-01-04

## 2024-01-04 RX ADMIN — Medication 20 MILLIEQUIVALENT(S): at 18:08

## 2024-01-04 NOTE — ED BEHAVIORAL HEALTH ASSESSMENT NOTE - NSBHATTESTBILLING_PSY_A_CORE
85451-Nmufesmvcby diagnostic evaluation with medical services [id number]-Luehmvhwtwa diagnostic evaluation with medical services

## 2024-01-04 NOTE — ED ADULT NURSE REASSESSMENT NOTE - DESCRIPTION
Pt received awake and alert laying on stretcher, states he has been feeling anxious in the afternoon lately, used to be in the AM upon waking. Pt is calm and cooperative at this time, plan for psych consult d/w pt, understanding verbalized. Pt denies any SI or intent to hurt himself. 1:1 with pt, room checked for safety.

## 2024-01-04 NOTE — ED BEHAVIORAL HEALTH ASSESSMENT NOTE - SUMMARY
39 yo male with h/o anxiety d/o (Panic per pt) and alcohol use disorder presents feeling overwhelmed with current work leading him to not be able to complete other tasks in life he wishes to work on.   Per ER, initially complained of being depressed.   Was found to have low K, which was repleted and with that and time, pt feels less overwhelmed.   Pt has outpt therapist and PCP who is prescribing SSRI with rec to inc which pt has not yet done.  Pt now denies any SI or any other safety issues and feels stable to return home.   Agrees to follow PCPs rec and try inc Zoloft to 100mg and to see therapist on Sat to discuss symptoms.

## 2024-01-04 NOTE — ED BEHAVIORAL HEALTH ASSESSMENT NOTE - VIOLENCE PROTECTIVE FACTORS:
Residential stability/Relationship stability/Employment stability Elidel Counseling: Patient may experience a mild burning sensation during topical application. Elidel is not approved in children less than 2 years of age. There have been case reports of hematologic and skin malignancies in patients using topical calcineurin inhibitors although causality is questionable.

## 2024-01-04 NOTE — ED ADULT NURSE NOTE - PRO INTERPRETER NEED 2
Dapsone Pregnancy And Lactation Text: This medication is Pregnancy Category C and is not considered safe during pregnancy or breast feeding. English

## 2024-01-04 NOTE — ED PROVIDER NOTE - CLINICAL SUMMARY MEDICAL DECISION MAKING FREE TEXT BOX
Patient's presents to ER for worsening depression suicidal thoughts.  Patient relates his symptoms have been worsening for the past few months, with no specific trigger.  Patient relates he had 1 prior suicide attempt approximately age 22 by cutting his left wrist.  Patient reports tobacco smoking some days, history of alcohol abuse was sober for approximately 5 years and relapsed beginning of COVID then was sober again for over a year and relapsed this past holiday break, last alcohol use almost 1 week ago.  Patient also reports remote history of cocaine use, marijuana use, overusing Xanax that he was prescribed and taking Adderall that was not prescribed for him.  Patient relates he lives with his father.  Patient follows with a therapist and a psychiatrist.    Plan EKG labs telepsych eval    Differential including but not limited to depression, substance abuse, suicidal ideation

## 2024-01-04 NOTE — ED ADULT NURSE NOTE - NSFALLUNIVINTERV_ED_ALL_ED
Bed/Stretcher in lowest position, wheels locked, appropriate side rails in place/Call bell, personal items and telephone in reach/Instruct patient to call for assistance before getting out of bed/chair/stretcher/Non-slip footwear applied when patient is off stretcher/[place] to call system/Physically safe environment - no spills, clutter or unnecessary equipment/Purposeful proactive rounding/Room/bathroom lighting operational, light cord in reach Bed/Stretcher in lowest position, wheels locked, appropriate side rails in place/Call bell, personal items and telephone in reach/Instruct patient to call for assistance before getting out of bed/chair/stretcher/Non-slip footwear applied when patient is off stretcher/[place] to call system/Physically safe environment - no spills, clutter or unnecessary equipment/Purposeful proactive rounding/Room/bathroom lighting operational, light cord in reach

## 2024-01-04 NOTE — ED ADULT NURSE NOTE - OBJECTIVE STATEMENT
C/o depression and anxiety. Patient endorses suicidal ideations, denies plan. Hx of attempt in past. Hx of ETOH and Drug abuse. Pt states he is currently taking sertraline but misses doses. +Auditory hallucinations, states voices are telling him to be "done and end it". Denies delusions. Hx of depression and anxiety. Belongings secured. 1:1 at bedside. Pt calm and cooperative. Pt alert and oriented x3, respirations even and unlabored, skin warm and dry, color appropriate for ethnicity, speech clear, moving extremities. Updated pt on plan of care. C/o depression and anxiety. Patient endorses suicidal ideations, denies plan. Hx of suicidal attempt in past. Hx of ETOH (last drink 6 days ago, states he was sober for about five years) and substance abuse. Pt states he is currently taking sertraline but misses doses. +Auditory hallucinations, states voices are telling him to be "done and end it". Denies delusions. Hx of depression and anxiety. Belongings secured. 1:1 at bedside. Pt calm and cooperative. Pt alert and oriented x3, respirations even and unlabored, skin warm and dry, color appropriate for ethnicity, speech clear, moving extremities. Updated pt on plan of care.

## 2024-01-04 NOTE — ED BEHAVIORAL HEALTH ASSESSMENT NOTE - OTHER PAST PSYCHIATRIC HISTORY (INCLUDE DETAILS REGARDING ONSET, COURSE OF ILLNESS, INPATIENT/OUTPATIENT TREATMENT)
Previous Dx: ADHD, Anxiety         Previous IP treatment: denies     Previous SA: denies     Self harming: denies     Current MH treatment: Therapist Sammie MENENDEZ every saturday; psychiatrist recently seen     Violence/Legal: denies

## 2024-01-04 NOTE — ED BEHAVIORAL HEALTH ASSESSMENT NOTE - DETAILS
Not indicated ER attending contacted with rec of evaluation Denies past SI mother-depression; sister-OCD

## 2024-01-04 NOTE — ED PROVIDER NOTE - PATIENT PORTAL LINK FT
You can access the FollowMyHealth Patient Portal offered by [facility] by registering at the following website: [email or web address]. By joining [employer]’s FollowMyHealth portal, you will also be able to view your health information using other applications (apps) compatible with our system. You can access the FollowMyHealth Patient Portal offered by [facility] by registering at the following website: [email or web address]. By joining [facility]’s FollowMyHealth portal, you will also be able to view your health information using other applications (apps) compatible with our system.

## 2024-01-04 NOTE — ED BEHAVIORAL HEALTH ASSESSMENT NOTE - DESCRIPTION
lives with father; works in skilled nursing care; has a girlfriend; reports good relationship with family lives with father; works in [employer] care; has a girlfriend; reports good relationship with family hydrocephalus s/p surgery in 2006 Pt seen by ER staff.  K was low at 3.3mg.  Pt states he was given a supplement and "with that and some time here" felt less anxious or overwhelmed.

## 2024-01-04 NOTE — ED BEHAVIORAL HEALTH NOTE - BEHAVIORAL HEALTH NOTE
===================    PRE-HOSPITAL COURSE    ==================    SOURCE:  ED provider and ED documentation     DETAILS:  Pt BiBself for worsening depression and SI    ============    ED COURSE    ============    SOURCE: ED provider and secondhand ED documentation.    ARRIVAL: Per ED documentation patient BiBself to ED. Patient cooperative with triage process.     BELONGINGS: Unknown   BEHAVIOR: ED provider described patient to be calm, remains in behavioral and impulse control, and is not in restraints. Pt currently has 1:1 sitter.  Pt is not displaying aggression towards staff or others and was described as cooperative. Per provider, pt presenting with normal affect and mood is congruent with affect. Pt has a linear thought process and able to answer questions appropriately. Provider stated that the patient is endorsing current SI. No mention HI. Per provider pt not endorsing A/VH.  There are healed scars on L wrist from past SA. Provider reports that the patient appears to have fair hygiene.   TREATMENT: No medication administered. No restraints.     VISITORS: None     -----------------------------------------------   COVID Exposure Screen- collateral (i.e. third-party, chart review, belongings, etc; include EMS and ED staff)   ---------------------------------------------------   1. Has the patient had a COVID-19 test in the last 90 days? Unknown.   2. Has the patient tested positive for COVID-19 antibodies? Unknown.   3.Has the patient received 2 doses of the COVID-19 vaccine?  Unknown.   4. In the past 10 days, has the patient been around anyone with a positive COVID-19 test?* Unknown.   5.Has the patient been out of [place] within the past 10 days? Unknown.

## 2024-01-04 NOTE — ED BEHAVIORAL HEALTH ASSESSMENT NOTE - DIFFERENTIAL
MALINDA, sub induced anxiety Thalidomide Counseling: I discussed with the patient the risks of thalidomide including but not limited to birth defects, anxiety, weakness, chest pain, dizziness, cough and severe allergy.

## 2024-01-04 NOTE — ED ADULT TRIAGE NOTE - CHIEF COMPLAINT QUOTE
" I have too much stress and change , like job - I left my other job for 17 years  ( 2 years ago ) . Im having depression . My doctor switched my medication (from Seroquel to sertraline 100mg daily )  " PMH Depression , Anxiety Alcohol abuse Drug abuse ( performance drug )

## 2024-01-04 NOTE — ED ADULT NURSE REASSESSMENT NOTE - NSFALLUNIVINTERV_ED_ALL_ED
Bed/Stretcher in lowest position, wheels locked, appropriate side rails in place/Call bell, personal items and telephone in reach/Instruct patient to call for assistance before getting out of bed/chair/stretcher/Non-slip footwear applied when patient is off stretcher/[place] to call system/Physically safe environment - no spills, clutter or unnecessary equipment/Purposeful proactive rounding/Room/bathroom lighting operational, light cord in reach Bed/Stretcher in lowest position, wheels locked, appropriate side rails in place/Call bell, personal items and telephone in reach/Instruct patient to call for assistance before getting out of bed/chair/stretcher/Non-slip footwear applied when patient is off stretcher/[place] to call system/Physically safe environment - no spills, clutter or unnecessary equipment/Purposeful proactive rounding/Room/bathroom lighting operational, light cord in reach

## 2024-01-04 NOTE — ED BEHAVIORAL HEALTH ASSESSMENT NOTE - RISK ASSESSMENT
Pt denies any SI.   Feels better after speaking to ER staff about symptoms.  Reports not coping well with stressors but has social supports and outpt treatment already in place.   Will also go back to AA for more support there.   Is not felt to be an acute danger to self/others.

## 2024-01-04 NOTE — ED BEHAVIORAL HEALTH ASSESSMENT NOTE - HPI (INCLUDE ILLNESS QUALITY, SEVERITY, DURATION, TIMING, CONTEXT, MODIFYING FACTORS, ASSOCIATED SIGNS AND SYMPTOMS)
39 yo male, domiciled with father, employed as  at [employer].   Walked into ER complaining of stress.  States he finds his work stressful as he is having a hard time adjusting to schedule.  States he has been working there for about a year and still finds it hard to fall asleep quickly and has to push self to get OOB early in day to do other activities.   Would like to be doing things like going to gym, working on diet, etc.   States he feels he has mood swings but these can happen moment to moment and do not last for days.   Pt aware his issue is "anxiety" and sees a psychologist "Hali MENENDEZ on [address]" weekly and gets meds from his PCP.   "I think they said Panic Disorder".   Does not report symptoms of depression or gagan for days on end.    Pt adamantly denies any current SI.   Talks about past job at [employer] being more rewarding and he was able to organize daily schedule without issue.     Pt tells me he did relapse on etoh about 5 days ago after 4 yrs of sobriety.   Drank for one night then woke up feeling badly and did not want to continue so quit again.   Pt questions if AA is helpful as he does not feel he has to drink but accepts the schedule and meetings and socialization is helpful for his anxiety and alcohol was a problem 4 yrs ago.      Outpt PCP rec pt inc Zoloft 50mg to 100mg recently but pt has not started that yet ("I wanted to finish the bottle I had").  Outpt PCP also Rx "something for alcohol use" but was not sure of name. 37 yo male, domiciled with father, employed as  at [employer].   Walked into ER complaining of stress.  States he finds his work stressful as he is having a hard time adjusting to schedule.  States he has been working there for about a year and still finds it hard to fall asleep quickly and has to push self to get OOB early in day to do other activities.   Would like to be doing things like going to gym, working on diet, etc.   States he feels he has mood swings but these can happen moment to moment and do not last for days.   Pt aware his issue is "anxiety" and sees a psychologist "Hali MENENDEZ on [address]" weekly and gets meds from his PCP.   "I think they said Panic Disorder".   Does not report symptoms of depression or gagan for days on end.    Pt adamantly denies any current SI.   Talks about past job at [employer] being more rewarding and he was able to organize daily schedule without issue.     Pt tells me he did relapse on etoh about 5 days ago after 4 yrs of sobriety.   Drank for one night then woke up feeling badly and did not want to continue so quit again.   Pt questions if AA is helpful as he does not feel he has to drink but accepts the schedule and meetings and socialization is helpful for his anxiety and alcohol was a problem 4 yrs ago.      Outpt PCP rec pt inc Zoloft 50mg to 100mg recently but pt has not started that yet ("I wanted to finish the bottle I had").  Outpt PCP also Rx "something for alcohol use" but was not sure of name.

## 2024-01-04 NOTE — ED ADULT NURSE REASSESSMENT NOTE - NS ED NURSE REASSESS COMMENT FT1
Outpatient psychiatrist Parker Chamberlain [phone] Outpatient psychiatrist Parker Chamberlain [phone]

## 2024-01-04 NOTE — ED PROVIDER NOTE - DIFFERENTIAL DIAGNOSIS
Differential Diagnosis Differential including but not limited to depression, substance abuse, suicidal ideation

## 2024-01-04 NOTE — ED BEHAVIORAL HEALTH ASSESSMENT NOTE - NSBHMSERELATED_PSY_A_CORE
1415- Pt in for kyphoplasty. Dr Lama Degree in to assess pt. Pt aware of risks and benefits and wishes to proceed. Pt consented per Dr. Lama Degree. Ambulates to BR with assist. Voided w/out diff. MD aware of medication allergies and wants ancef prior to procedure. Pre procedural workup completed. 1620- Bolus NS for drop in BP. Pt placed in trendelenburg. 1650- Blood pressure increased. Pt responsive to simple commands. Avelino upper ext grasps WDL. Pt able to push with BLE. Pt cont to be groggy only responding to questions and then goes back to sleep. 1700- HOB to 30. Pt responds to simple commands. Answers questions. VSS. REport called to [facility] nurse on ortho. 1725- Pt to room per this nurse. Report given at bedside with primary nurse Ceci Alvarado. Spoke with dtr Krysta Lozano at length. Dtr wishes to be called prior to any procedures. This information was also given to [facility] nurse. SCD's placed. Pt turned to side and positioned for comfort. Drsgs to back D/I. Good

## 2024-01-04 NOTE — ED ADULT NURSE REASSESSMENT NOTE - AS PAIN REST
"Subjective   Patient ID: Carmen Vinson is a 89 y.o. male who presents for Medicare Annual Wellness Visit Subsequent (3 mo fu rev labs).    HPI   Rides bike for 30min a day, less walking as back gets stiff, otherwise no back problem  CAD-no angina  HTN-Takes and tolerates meds without side effects. No alcohol. no tobacco.  Regular exercise. low salt.  Reviewed recommendation for 150 minutes of exercise per week including 2 days of weight training if over age 50  Hyperlipidemia- on statin and prudent diet  Hypothyroid- is euthyroid on replacement. Thyroid ros is unremarkable.  Elevated PSA managed by urology  History of colon cancer has been stable  Diabetes A1c acceptable at 7.4  Requests letter for home delivery as mailbox is distant from front door and in winter the ice and snow are an impediment due to his reduced ambulation Tory ability/  Review of Systems  General-no fatigue weight to within 10 pounds  ENT no problems with vision swallowing  Cardiac no chest pains palpitations change in exercise tolerance or capacity  Pulmonary no cough shortness of breath  GI no heartburn or abdominal pain  Musculoskeletal + joint pains  Objective   /58   Pulse 79   Ht 1.651 m (5' 5\")   Wt 79.2 kg (174 lb 8 oz)   SpO2 97%   BMI 29.04 kg/m²     Physical Exam  General:  Alert, No acute distress. Appears stated age  Eye:  Pupils are equal, round and reactive to light, Extraocular movements are intact, Normal conjunctiva.    Neck:  Supple, Non-tender, No carotid bruit, No jugular venous distention, No lymphadenopathy, No thyromegaly.    Respiratory:  Lungs are clear to auscultation, Respirations are non-labored, Breath sounds are equal.    Cardiovascular:  Normal rate, Regular rhythm, No murmur.    Gastrointestinal:  Soft, Non-tender, No organomegaly. No solid or pulsatile mass  Integumentary:  Warm, Dry. No concerning lesions on exposed areas  Neurologic:  Alert, Oriented.  Gross and fine motor intact, CN 2-12 " intact  Psychiatric:  Cooperative, Appropriate mood & affect.  Assessment/Plan   Problem List Items Addressed This Visit             ICD-10-CM    CAD (coronary artery disease) I25.10    Relevant Orders    Follow Up In Primary Care    Colon cancer (CMS/[facility]) C18.9    Relevant Orders    Follow Up In Primary Care    Diabetes mellitus (CMS/[facility]) E11.9    Relevant Orders    Follow Up In Primary Care    Comprehensive Metabolic Panel    CBC    Albumin , Urine Random    Hemoglobin A1C    Elevated PSA measurement R97.20    Relevant Orders    Follow Up In Primary Care    HTN (hypertension), benign I10    Relevant Orders    Follow Up In Primary Care    Comprehensive Metabolic Panel    CBC    Hyperlipidemia E78.5    Relevant Orders    Follow Up In Primary Care    Comprehensive Metabolic Panel    CBC    Lipid Panel    Hypothyroid E03.9    Relevant Orders    Follow Up In Primary Care    Thyroid Stimulating Hormone     Other Visit Diagnoses         Codes    Routine general medical examination at health care facility    -  Primary Z00.00                0 (no pain/absence of nonverbal indicators of pain)

## 2024-01-04 NOTE — ED PROVIDER NOTE - NSFOLLOWUPINSTRUCTIONS_ED_ALL_ED_FT
Managing Depression, Adult  Depression is a mental health condition that affects your thoughts, feelings, and actions. Being diagnosed with depression can bring you relief if you did not know why you have felt or behaved a certain way. It could also leave you feeling overwhelmed. Finding ways to manage your symptoms can help you feel more positive about your future.    How to manage lifestyle changes  Being depressed is difficult. Depression can increase the level of everyday stress. Stress can make depression symptoms worse. You may believe your symptoms cannot be managed or will never improve. However, there are many things you can try to help manage your symptoms. There is hope.    Managing stress    Person sitting at a desk and writing in a notebook.   Stress is your body's reaction to life changes and events, both good and bad. Stress can add to your feelings of depression. Learning to manage your stress can help lessen your feelings of depression.    Try some of the following approaches to reducing your stress (stress reduction techniques):  Listen to music that you enjoy and that inspires you.  Try using a meditation ramiro or take a meditation class.  Develop a practice that helps you connect with your spiritual self. Walk in nature, pray, or go to a place of [employer].  Practice deep breathing. To do this, inhale slowly through your nose. Pause at the top of your inhale for a few seconds and then exhale slowly, letting yourself relax. Repeat this three or four times.  Practice yoga to help relax and work your muscles.  Choose a stress reduction technique that works for you. These techniques take time and practice to develop. Set aside 5–15 minutes a day to do them. Therapists can offer training in these techniques. Do these things to help manage stress:  Keep a journal.  Know your limits. Set healthy boundaries for yourself and others, such as saying "no" when you think something is too much.  Pay attention to how you react to certain situations. You may not be able to control everything, but you can change your reaction.  Add humor to your life by watching funny movies or shows.  Make time for activities that you enjoy and that relax you.  Spend less time using electronics, especially at night before bed. The light from screens can make your brain think it is time to get up rather than go to bed.  Medicines    Medicines, such as antidepressants, are often a part of treatment for depression.  Talk with your pharmacist or health care provider about all the medicines, supplements, and herbal products that you take, their possible side effects, and what medicines and other products are safe to take together.  Make sure to report any side effects you may have to your health care provider.  Relationships    Your health care provider may suggest family therapy, couples therapy, or individual therapy as part of your treatment.    How to recognize changes  Everyone responds differently to treatment for depression. As you recover from depression, you may start to:  Have more interest in doing activities.  Feel more hopeful.  Have more energy.  Eat a more regular amount of food.  Have better mental focus.  It is important to recognize if your depression is not getting better or is getting worse. The symptoms you had in the beginning may return, such as:  Feeling tired.  Eating too much or too little.  Sleeping too much or too little.  Feeling restless, agitated, or hopeless.  Trouble focusing or making decisions.  Having unexplained aches and pains.  Feeling irritable, angry, or aggressive.  If you or your family members notice these symptoms coming back, let your health care provider know right away.    Follow these instructions at home:  Activity    Try to get some form of exercise each day, such as walking.  Try yoga, mindfulness, or other stress reduction techniques.  Participate in group activities if you are able.  Lifestyle    Get enough sleep.  Cut down on or stop using caffeine, tobacco, alcohol, and any other harmful substances.  Eat a healthy diet that includes plenty of vegetables, fruits, whole grains, low-fat dairy products, and lean protein. Limit foods that are high in solid fats, added sugar, or salt (sodium).  General instructions    Take over-the-counter and prescription medicines only as told by your health care provider.  Keep all follow-up visits. It is important for your health care provider to check on your mood, behavior, and medicines. Your health care provider may need to make changes to your treatment.  Where to find support  Talking to others    Two people walking outdoors.   Friends and family members can be sources of support and guidance. Talk to trusted friends or family members about your condition. Explain your symptoms and let them know that you are working with a health care provider to treat your depression. Tell friends and family how they can help.    Finances    Find mental health providers that fit with your financial situation.  Talk with your health care provider if you are worried about access to food, housing, or medicine.  Call your insurance company to learn about your co-pays and prescription plan.  Where to find more information  You can find support in your area from:  Anxiety and Depression Association of [place] (ADAA): adaa.org  Mental Health [place]: mentalhealthamerica.net  National [place] on Mental Illness: erlin.org  Contact a health care provider if:  You stop taking your antidepressant medicines, and you have any of these symptoms:  Nausea.  Headache.  Light-headedness.  Chills and body aches.  Not being able to sleep (insomnia).  You or your friends and family think your depression is getting worse.  Get help right away if:  You have thoughts of hurting yourself or others.  Get help right away if you feel like you may hurt yourself or others, or have thoughts about taking your own life. Go to your nearest emergency room or:  Call 911.  Call the National Suicide Prevention Lifeline at 1-299.609.3971 or 048. This is open 24 hours a day.  Text the Crisis Text Line at 391808.  This information is not intended to replace advice given to you by your health care provider. Make sure you discuss any questions you have with your health care provider. Managing Depression, Adult  Depression is a mental health condition that affects your thoughts, feelings, and actions. Being diagnosed with depression can bring you relief if you did not know why you have felt or behaved a certain way. It could also leave you feeling overwhelmed. Finding ways to manage your symptoms can help you feel more positive about your future.    How to manage lifestyle changes  Being depressed is difficult. Depression can increase the level of everyday stress. Stress can make depression symptoms worse. You may believe your symptoms cannot be managed or will never improve. However, there are many things you can try to help manage your symptoms. There is hope.    Managing stress    Person sitting at a desk and writing in a notebook.   Stress is your body's reaction to life changes and events, both good and bad. Stress can add to your feelings of depression. Learning to manage your stress can help lessen your feelings of depression.    Try some of the following approaches to reducing your stress (stress reduction techniques):  Listen to music that you enjoy and that inspires you.  Try using a meditation ramiro or take a meditation class.  Develop a practice that helps you connect with your spiritual self. Walk in nature, pray, or go to a place of [employer].  Practice deep breathing. To do this, inhale slowly through your nose. Pause at the top of your inhale for a few seconds and then exhale slowly, letting yourself relax. Repeat this three or four times.  Practice yoga to help relax and work your muscles.  Choose a stress reduction technique that works for you. These techniques take time and practice to develop. Set aside 5–15 minutes a day to do them. Therapists can offer training in these techniques. Do these things to help manage stress:  Keep a journal.  Know your limits. Set healthy boundaries for yourself and others, such as saying "no" when you think something is too much.  Pay attention to how you react to certain situations. You may not be able to control everything, but you can change your reaction.  Add humor to your life by watching funny movies or shows.  Make time for activities that you enjoy and that relax you.  Spend less time using electronics, especially at night before bed. The light from screens can make your brain think it is time to get up rather than go to bed.  Medicines    Medicines, such as antidepressants, are often a part of treatment for depression.  Talk with your pharmacist or health care provider about all the medicines, supplements, and herbal products that you take, their possible side effects, and what medicines and other products are safe to take together.  Make sure to report any side effects you may have to your health care provider.  Relationships    Your health care provider may suggest family therapy, couples therapy, or individual therapy as part of your treatment.    How to recognize changes  Everyone responds differently to treatment for depression. As you recover from depression, you may start to:  Have more interest in doing activities.  Feel more hopeful.  Have more energy.  Eat a more regular amount of food.  Have better mental focus.  It is important to recognize if your depression is not getting better or is getting worse. The symptoms you had in the beginning may return, such as:  Feeling tired.  Eating too much or too little.  Sleeping too much or too little.  Feeling restless, agitated, or hopeless.  Trouble focusing or making decisions.  Having unexplained aches and pains.  Feeling irritable, angry, or aggressive.  If you or your family members notice these symptoms coming back, let your health care provider know right away.    Follow these instructions at home:  Activity    Try to get some form of exercise each day, such as walking.  Try yoga, mindfulness, or other stress reduction techniques.  Participate in group activities if you are able.  Lifestyle    Get enough sleep.  Cut down on or stop using caffeine, tobacco, alcohol, and any other harmful substances.  Eat a healthy diet that includes plenty of vegetables, fruits, whole grains, low-fat dairy products, and lean protein. Limit foods that are high in solid fats, added sugar, or salt (sodium).  General instructions    Take over-the-counter and prescription medicines only as told by your health care provider.  Keep all follow-up visits. It is important for your health care provider to check on your mood, behavior, and medicines. Your health care provider may need to make changes to your treatment.  Where to find support  Talking to others    Two people walking outdoors.   Friends and family members can be sources of support and guidance. Talk to trusted friends or family members about your condition. Explain your symptoms and let them know that you are working with a health care provider to treat your depression. Tell friends and family how they can help.    Finances    Find mental health providers that fit with your financial situation.  Talk with your health care provider if you are worried about access to food, housing, or medicine.  Call your insurance company to learn about your co-pays and prescription plan.  Where to find more information  You can find support in your area from:  Anxiety and Depression Association of [place] (ADAA): adaa.org  Mental Health [place]: mentalhealthamerica.net  National [place] on Mental Illness: erlin.org  Contact a health care provider if:  You stop taking your antidepressant medicines, and you have any of these symptoms:  Nausea.  Headache.  Light-headedness.  Chills and body aches.  Not being able to sleep (insomnia).  You or your friends and family think your depression is getting worse.  Get help right away if:  You have thoughts of hurting yourself or others.  Get help right away if you feel like you may hurt yourself or others, or have thoughts about taking your own life. Go to your nearest emergency room or:  Call 911.  Call the National Suicide Prevention Lifeline at 1-531.666.4310 or 056. This is open 24 hours a day.  Text the Crisis Text Line at 037432.  This information is not intended to replace advice given to you by your health care provider. Make sure you discuss any questions you have with your health care provider.

## 2024-01-04 NOTE — ED BEHAVIORAL HEALTH ASSESSMENT NOTE - NS ED [facility] ED COURSE UTILIZATION OF 1 TO 1 IN ED YN
After discussing MRI denial for patient, [facility] staff called the patient to inform him of the denied MRI due the need to continue physical therapy. Patient was informed by KAYA Kline, that he can be re-assessed for the MRI need at his follow up.    Yes

## 2024-03-04 ENCOUNTER — INPATIENT (INPATIENT)
Facility: HOSPITAL | Age: 39
LOS: 1 days | Discharge: HOME | DRG: 386 | End: 2024-03-06
Attending: FAMILY MEDICINE | Admitting: FAMILY MEDICINE
Payer: COMMERCIAL

## 2024-03-04 VITALS
SYSTOLIC BLOOD PRESSURE: 140 MMHG | HEIGHT: 75 IN | DIASTOLIC BLOOD PRESSURE: 83 MMHG | OXYGEN SATURATION: 99 % | RESPIRATION RATE: 20 BRPM | HEART RATE: 116 BPM | TEMPERATURE: 101 F | WEIGHT: 220.02 LBS

## 2024-03-04 DIAGNOSIS — R19.7 DIARRHEA, UNSPECIFIED: ICD-10-CM

## 2024-03-04 DIAGNOSIS — K51.00 ULCERATIVE (CHRONIC) PANCOLITIS WITHOUT COMPLICATIONS: ICD-10-CM

## 2024-03-04 DIAGNOSIS — K52.9 NONINFECTIVE GASTROENTERITIS AND COLITIS, UNSPECIFIED: ICD-10-CM

## 2024-03-04 DIAGNOSIS — Z29.9 ENCOUNTER FOR PROPHYLACTIC MEASURES, UNSPECIFIED: ICD-10-CM

## 2024-03-04 PROBLEM — F41.9 ANXIETY DISORDER, UNSPECIFIED: Chronic | Status: ACTIVE | Noted: 2024-01-04

## 2024-03-04 LAB
ALBUMIN SERPL ELPH-MCNC: 2.4 G/DL — LOW (ref 3.3–5)
ALP SERPL-CCNC: 82 U/L — SIGNIFICANT CHANGE UP (ref 30–120)
ALT FLD-CCNC: 19 U/L — SIGNIFICANT CHANGE UP (ref 10–60)
ANION GAP SERPL CALC-SCNC: 7 MMOL/L — SIGNIFICANT CHANGE UP (ref 5–17)
APPEARANCE UR: CLEAR — SIGNIFICANT CHANGE UP
APTT BLD: 29.4 SEC — SIGNIFICANT CHANGE UP (ref 24.5–35.6)
AST SERPL-CCNC: 10 U/L — SIGNIFICANT CHANGE UP (ref 10–40)
BACTERIA # UR AUTO: ABNORMAL /HPF
BASOPHILS # BLD AUTO: 0 K/UL — SIGNIFICANT CHANGE UP (ref 0–0.2)
BASOPHILS NFR BLD AUTO: 0 % — SIGNIFICANT CHANGE UP (ref 0–2)
BILIRUB SERPL-MCNC: 0.8 MG/DL — SIGNIFICANT CHANGE UP (ref 0.2–1.2)
BILIRUB UR-MCNC: NEGATIVE — SIGNIFICANT CHANGE UP
BUN SERPL-MCNC: 5 MG/DL — LOW (ref 7–23)
CALCIUM SERPL-MCNC: 8.6 MG/DL — SIGNIFICANT CHANGE UP (ref 8.4–10.5)
CHLORIDE SERPL-SCNC: 98 MMOL/L — SIGNIFICANT CHANGE UP (ref 96–108)
CO2 SERPL-SCNC: 30 MMOL/L — SIGNIFICANT CHANGE UP (ref 22–31)
COLOR SPEC: YELLOW — SIGNIFICANT CHANGE UP
CREAT SERPL-MCNC: 1.03 MG/DL — SIGNIFICANT CHANGE UP (ref 0.5–1.3)
DACRYOCYTES BLD QL SMEAR: SLIGHT — SIGNIFICANT CHANGE UP
DIFF PNL FLD: ABNORMAL
EGFR: 97 ML/MIN/1.73M2 — SIGNIFICANT CHANGE UP
EOSINOPHIL # BLD AUTO: 0 K/UL — SIGNIFICANT CHANGE UP (ref 0–0.5)
EOSINOPHIL NFR BLD AUTO: 0 % — SIGNIFICANT CHANGE UP (ref 0–6)
EPI CELLS # UR: PRESENT
FLUAV AG NPH QL: SIGNIFICANT CHANGE UP
FLUBV AG NPH QL: SIGNIFICANT CHANGE UP
GLUCOSE SERPL-MCNC: 144 MG/DL — HIGH (ref 70–99)
GLUCOSE UR QL: NEGATIVE MG/DL — SIGNIFICANT CHANGE UP
HCT VFR BLD CALC: 40 % — SIGNIFICANT CHANGE UP (ref 39–50)
HGB BLD-MCNC: 13.5 G/DL — SIGNIFICANT CHANGE UP (ref 13–17)
INR BLD: 1.27 RATIO — HIGH (ref 0.85–1.18)
KETONES UR-MCNC: NEGATIVE MG/DL — SIGNIFICANT CHANGE UP
LACTATE SERPL-SCNC: 1.7 MMOL/L — SIGNIFICANT CHANGE UP (ref 0.7–2)
LEUKOCYTE ESTERASE UR-ACNC: NEGATIVE — SIGNIFICANT CHANGE UP
LYMPHOCYTES # BLD AUTO: 0.7 K/UL — LOW (ref 1–3.3)
LYMPHOCYTES # BLD AUTO: 6 % — LOW (ref 13–44)
MANUAL SMEAR VERIFICATION: SIGNIFICANT CHANGE UP
MCHC RBC-ENTMCNC: 29.5 PG — SIGNIFICANT CHANGE UP (ref 27–34)
MCHC RBC-ENTMCNC: 33.8 GM/DL — SIGNIFICANT CHANGE UP (ref 32–36)
MCV RBC AUTO: 87.5 FL — SIGNIFICANT CHANGE UP (ref 80–100)
MONOCYTES # BLD AUTO: 0.93 K/UL — HIGH (ref 0–0.9)
MONOCYTES NFR BLD AUTO: 8 % — SIGNIFICANT CHANGE UP (ref 2–14)
NEUTROPHILS # BLD AUTO: 9.88 K/UL — HIGH (ref 1.8–7.4)
NEUTROPHILS NFR BLD AUTO: 81 % — HIGH (ref 43–77)
NEUTS BAND # BLD: 4 % — SIGNIFICANT CHANGE UP (ref 0–8)
NITRITE UR-MCNC: NEGATIVE — SIGNIFICANT CHANGE UP
NRBC # BLD: 0 /100 WBCS — SIGNIFICANT CHANGE UP (ref 0–0)
NRBC # BLD: SIGNIFICANT CHANGE UP /100 WBCS (ref 0–0)
PH UR: 6.5 — SIGNIFICANT CHANGE UP (ref 5–8)
PLAT MORPH BLD: NORMAL — SIGNIFICANT CHANGE UP
PLATELET # BLD AUTO: 285 K/UL — SIGNIFICANT CHANGE UP (ref 150–400)
POIKILOCYTOSIS BLD QL AUTO: SLIGHT — SIGNIFICANT CHANGE UP
POTASSIUM SERPL-MCNC: 3.5 MMOL/L — SIGNIFICANT CHANGE UP (ref 3.5–5.3)
POTASSIUM SERPL-SCNC: 3.5 MMOL/L — SIGNIFICANT CHANGE UP (ref 3.5–5.3)
PROT SERPL-MCNC: 6.8 G/DL — SIGNIFICANT CHANGE UP (ref 6–8.3)
PROT UR-MCNC: SIGNIFICANT CHANGE UP MG/DL
PROTHROM AB SERPL-ACNC: 14.1 SEC — HIGH (ref 9.5–13)
RBC # BLD: 4.57 M/UL — SIGNIFICANT CHANGE UP (ref 4.2–5.8)
RBC # FLD: 12 % — SIGNIFICANT CHANGE UP (ref 10.3–14.5)
RBC BLD AUTO: ABNORMAL
RBC CASTS # UR COMP ASSIST: 1 /HPF — SIGNIFICANT CHANGE UP (ref 0–4)
RSV RNA NPH QL NAA+NON-PROBE: SIGNIFICANT CHANGE UP
SARS-COV-2 RNA SPEC QL NAA+PROBE: SIGNIFICANT CHANGE UP
SODIUM SERPL-SCNC: 135 MMOL/L — SIGNIFICANT CHANGE UP (ref 135–145)
SP GR SPEC: 1.07 — HIGH (ref 1–1.03)
UROBILINOGEN FLD QL: 0.2 MG/DL — SIGNIFICANT CHANGE UP (ref 0.2–1)
VARIANT LYMPHS # BLD: 1 % — SIGNIFICANT CHANGE UP (ref 0–6)
WBC # BLD: 11.62 K/UL — HIGH (ref 3.8–10.5)
WBC # FLD AUTO: 11.62 K/UL — HIGH (ref 3.8–10.5)
WBC UR QL: 3 /HPF — SIGNIFICANT CHANGE UP (ref 0–5)

## 2024-03-04 PROCEDURE — 74177 CT ABD & PELVIS W/CONTRAST: CPT | Mod: 26,MC

## 2024-03-04 PROCEDURE — 99285 EMERGENCY DEPT VISIT HI MDM: CPT

## 2024-03-04 PROCEDURE — 71045 X-RAY EXAM CHEST 1 VIEW: CPT | Mod: 26

## 2024-03-04 RX ORDER — CIPROFLOXACIN LACTATE 400MG/40ML
400 VIAL (ML) INTRAVENOUS EVERY 12 HOURS
Refills: 0 | Status: DISCONTINUED | OUTPATIENT
Start: 2024-03-04 | End: 2024-03-05

## 2024-03-04 RX ORDER — ONDANSETRON 8 MG/1
4 TABLET, FILM COATED ORAL ONCE
Refills: 0 | Status: COMPLETED | OUTPATIENT
Start: 2024-03-04 | End: 2024-03-04

## 2024-03-04 RX ORDER — SODIUM CHLORIDE 9 MG/ML
1000 INJECTION INTRAMUSCULAR; INTRAVENOUS; SUBCUTANEOUS
Refills: 0 | Status: DISCONTINUED | OUTPATIENT
Start: 2024-03-04 | End: 2024-03-05

## 2024-03-04 RX ORDER — PIPERACILLIN AND TAZOBACTAM 4; .5 G/20ML; G/20ML
3.38 INJECTION, POWDER, LYOPHILIZED, FOR SOLUTION INTRAVENOUS ONCE
Refills: 0 | Status: COMPLETED | OUTPATIENT
Start: 2024-03-04 | End: 2024-03-04

## 2024-03-04 RX ORDER — HEPARIN SODIUM 5000 [USP'U]/ML
5000 INJECTION INTRAVENOUS; SUBCUTANEOUS EVERY 12 HOURS
Refills: 0 | Status: DISCONTINUED | OUTPATIENT
Start: 2024-03-04 | End: 2024-03-05

## 2024-03-04 RX ORDER — SERTRALINE 25 MG/1
100 TABLET, FILM COATED ORAL DAILY
Refills: 0 | Status: DISCONTINUED | OUTPATIENT
Start: 2024-03-04 | End: 2024-03-06

## 2024-03-04 RX ORDER — SODIUM CHLORIDE 9 MG/ML
3100 INJECTION INTRAMUSCULAR; INTRAVENOUS; SUBCUTANEOUS ONCE
Refills: 0 | Status: COMPLETED | OUTPATIENT
Start: 2024-03-04 | End: 2024-03-04

## 2024-03-04 RX ORDER — CHOLESTYRAMINE 4 G/9G
4 POWDER, FOR SUSPENSION ORAL DAILY
Refills: 0 | Status: DISCONTINUED | OUTPATIENT
Start: 2024-03-04 | End: 2024-03-06

## 2024-03-04 RX ORDER — LACTOBACILLUS ACIDOPHILUS 100MM CELL
1 CAPSULE ORAL DAILY
Refills: 0 | Status: DISCONTINUED | OUTPATIENT
Start: 2024-03-04 | End: 2024-03-06

## 2024-03-04 RX ORDER — ONDANSETRON 8 MG/1
4 TABLET, FILM COATED ORAL
Refills: 0 | Status: DISCONTINUED | OUTPATIENT
Start: 2024-03-04 | End: 2024-03-06

## 2024-03-04 RX ORDER — POTASSIUM CHLORIDE 20 MEQ
10 PACKET (EA) ORAL DAILY
Refills: 0 | Status: DISCONTINUED | OUTPATIENT
Start: 2024-03-04 | End: 2024-03-06

## 2024-03-04 RX ORDER — METRONIDAZOLE 500 MG
500 TABLET ORAL EVERY 8 HOURS
Refills: 0 | Status: DISCONTINUED | OUTPATIENT
Start: 2024-03-04 | End: 2024-03-05

## 2024-03-04 RX ORDER — ACETAMINOPHEN 500 MG
650 TABLET ORAL EVERY 6 HOURS
Refills: 0 | Status: DISCONTINUED | OUTPATIENT
Start: 2024-03-04 | End: 2024-03-05

## 2024-03-04 RX ORDER — ACETAMINOPHEN 500 MG
1000 TABLET ORAL ONCE
Refills: 0 | Status: COMPLETED | OUTPATIENT
Start: 2024-03-04 | End: 2024-03-04

## 2024-03-04 RX ADMIN — Medication 100 MILLIGRAM(S): at 22:10

## 2024-03-04 RX ADMIN — Medication 400 MILLIGRAM(S): at 16:09

## 2024-03-04 RX ADMIN — Medication 1000 MILLIGRAM(S): at 16:50

## 2024-03-04 RX ADMIN — PIPERACILLIN AND TAZOBACTAM 200 GRAM(S): 4; .5 INJECTION, POWDER, LYOPHILIZED, FOR SOLUTION INTRAVENOUS at 17:44

## 2024-03-04 RX ADMIN — SODIUM CHLORIDE 3100 MILLILITER(S): 9 INJECTION INTRAMUSCULAR; INTRAVENOUS; SUBCUTANEOUS at 16:08

## 2024-03-04 RX ADMIN — PIPERACILLIN AND TAZOBACTAM 3.38 GRAM(S): 4; .5 INJECTION, POWDER, LYOPHILIZED, FOR SOLUTION INTRAVENOUS at 18:14

## 2024-03-04 RX ADMIN — SODIUM CHLORIDE 3100 MILLILITER(S): 9 INJECTION INTRAMUSCULAR; INTRAVENOUS; SUBCUTANEOUS at 17:08

## 2024-03-04 RX ADMIN — Medication 10 MILLIEQUIVALENT(S): at 19:54

## 2024-03-04 RX ADMIN — ONDANSETRON 4 MILLIGRAM(S): 8 TABLET, FILM COATED ORAL at 16:09

## 2024-03-04 RX ADMIN — SODIUM CHLORIDE 100 MILLILITER(S): 9 INJECTION INTRAMUSCULAR; INTRAVENOUS; SUBCUTANEOUS at 19:40

## 2024-03-04 RX ADMIN — Medication 1000 MILLIGRAM(S): at 16:24

## 2024-03-04 NOTE — ED PROVIDER NOTE - DIFFERENTIAL DIAGNOSIS
Differential including but not limited to sepsis colitis diverticulitis enteritis electrolyte abnormality dehydration Differential Diagnosis

## 2024-03-04 NOTE — CONSULT NOTE ADULT - ASSESSMENT
diarrhea  abdominal pain fevers    pan colitis on CT   ?infectious vs ?inflammatory vs ?ischemic    PLAN  IBD workup [facility]  follow up stool studies  bacid one tab tid  low residue lactose free diet  if stool negative consider imodium  replete electrolytes as needed   Will eventually need outpatient colonoscopy.   will follow    Discussed with Dr. William.

## 2024-03-04 NOTE — H&P ADULT - NSHPREVIEWOFSYSTEMS_GEN_ALL_CORE
· CONSTITUTIONAL: no fever and no chills.  · RESPIRATORY: no chest pain, no cough, and no shortness of breath.  · GASTROINTESTINAL: - - -  · Gastrointestinal [+]: ABDOMINAL PAIN, DIARRHEA, NAUSEA  · Gastrointestinal [-]: no vomiting  · GENITOURINARY: no dysuria, no frequency, and no hematuria.  · ROS STATEMENT: all other ROS negative except as per HPI

## 2024-03-04 NOTE — ED ADULT NURSE NOTE - AS SC BRADEN MOISTURE
complains of pain/discomfort
(4) rarely moist
Ketoconazole Pregnancy And Lactation Text: This medication is Pregnancy Category C and it isn't know if it is safe during pregnancy. It is also excreted in breast milk and breast feeding isn't recommended.

## 2024-03-04 NOTE — ED ADULT NURSE NOTE - OBJECTIVE STATEMENT
pt with c/o liquid diarrhea for almost 2 weeks now, decreased appetite, subjective fevers. pt reports it is dark brown liquid, "explosive diarrhea". denies blood in stool but states in the beginning 2 weeks ago there was some blood in toilet. LLQ tender.

## 2024-03-04 NOTE — H&P ADULT - NSHPPHYSICALEXAM_GEN_ALL_CORE
· CONSTITUTIONAL: Well appearing, awake, alert, oriented to person, place, time/situation and in no apparent distress.  · ENMT: Airway patent, Mouth with normal mucosa.  · CARDIAC: - - -  · CARDIAC RHYTHM: regular  · CARDIAC RATE: TACHYCARDIC  · CARDIAC SOUNDS: S1-S2  · CARDIAC PULSES: normal bilaterally  · RESPIRATORY: Breath sounds clear and equal bilaterally.  · GASTROINTESTINAL: - - -  · Abdominal Exam: soft  · Abdominal Tenderness Location: left lower quadrant  · Abdominal Nontender Location: left upper quadrant, right upper quadrant, right lower quadrant, left costovertebral angle, right costovertebral angle  · MUSCULOSKELETAL: Spine appears normal, range of motion is not limited, no muscle or joint tenderness  · NEUROLOGICAL: Alert and oriented, no focal deficits, no motor or sensory deficits.

## 2024-03-04 NOTE — H&P ADULT - HISTORY OF PRESENT ILLNESS
Patient complaining of 2 weeks of left lower quadrant abdominal pain associated with watery diarrhea and mild nausea.  Patient denies fever vomiting chest pain short breath cough dysuria hematuria frequency.  Patient denies sick contacts recent hospitalizations or antibiotic usage.  In ER patient was found to have pan colitis.  patient is being admitted for further work up and treatment.

## 2024-03-04 NOTE — ED PROVIDER NOTE - CLINICAL SUMMARY MEDICAL DECISION MAKING FREE TEXT BOX
Patient complaining of 2 weeks of left lower quadrant abdominal pain associated with watery diarrhea and mild nausea.  Patient denies fever vomiting chest pain short breath cough dysuria hematuria frequency.  Patient denies sick contacts recent hospitalizations or antibiotic usage.  PMD Piscone    Plan Labs CT abdomen pelvis chest x-ray IV fluids Ofirmev of Zofran    Differential including but not limited to sepsis colitis diverticulitis enteritis electrolyte abnormality dehydration

## 2024-03-04 NOTE — ED ADULT NURSE NOTE - NS ED NOTE  TALK SOMEONE YN
Patient Seen in: [facility]      History   Patient presents with:  Cough/URI  Sinus Problem    Stated Complaint: cough/congestion    HPI/Subjective:   HPI    Rosas Mcguire is a 68-year-old male who presents today for evaluation of nasal congestio normal conjunctiva  Ears: External: Non-tender, normal in appearance; canals clear; TM normal in appearance with bilateral serous effusions noted  Nose: Bilateral nasal turbinates edematous, congested with discharge noted.   Mild, left-sided maxillary sinus Disposition and Plan     Clinical Impression:  Acute non-recurrent sinusitis, unspecified location  (primary encounter diagnosis)  Viral URI with cough     Disposition:  Discharge  7/10/2021  9:41 am    Follow-up:  Eleazar Martinez MD  [address] [place] [address] No

## 2024-03-04 NOTE — ED PROVIDER NOTE - OBJECTIVE STATEMENT
Patient complaining of 2 weeks of left lower quadrant abdominal pain associated with watery diarrhea and mild nausea.  Patient denies fever vomiting chest pain short breath cough dysuria hematuria frequency.  Patient denies sick contacts recent hospitalizations or antibiotic usage.  KEVAN Albert

## 2024-03-04 NOTE — H&P ADULT - NSICDXPASTSURGICALHX_GEN_ALL_CORE_FT
Spoke with pt and he advised him of Lillian's response and recommendations  Pt states that he has not picked up his Maxalt but will check status of it today  Pt also says he has not seen Lillian's message regarding CTA results  Advised him of Lillian's message  He verbalizes understanding  Regarding increasing Diamox, pt says he is reluctant to do this because he reports having weird dreams at night ever since he started Diamox and Nurtec  Not sure which medication could be causing this  Pt to get labs done tomorrow  Lillian - Please advise on pt experiencing possible side effect to medications  [facility] [phone], ok to leave detailed message  PAST SURGICAL HISTORY:  No significant past surgical history

## 2024-03-04 NOTE — H&P ADULT - NSHPLABSRESULTS_GEN_ALL_CORE
03-04    135  |  98  |  5<L>  ----------------------------<  144<H>  3.5   |  30  |  1.03    Ca    8.6      04 Mar 2024 15:36    TPro  6.8  /  Alb  2.4<L>  /  TBili  0.8  /  DBili  x   /  AST  10  /  ALT  19  /  AlkPhos  82  03-04                            13.5   11.62 )-----------( 285      ( 04 Mar 2024 15:36 )             40.0             LIVER FUNCTIONS - ( 04 Mar 2024 15:36 )  Alb: 2.4 g/dL / Pro: 6.8 g/dL / ALK PHOS: 82 U/L / ALT: 19 U/L / AST: 10 U/L / GGT: x             PT/INR - ( 04 Mar 2024 15:36 )   PT: 14.1 sec;   INR: 1.27 ratio         PTT - ( 04 Mar 2024 15:36 )  PTT:29.4 sec    Urinalysis Basic - ( 04 Mar 2024 15:36 )    Color: x / Appearance: x / SG: x / pH: x  Gluc: 144 mg/dL / Ketone: x  / Bili: x / Urobili: x   Blood: x / Protein: x / Nitrite: x   Leuk Esterase: x / RBC: x / WBC x   Sq Epi: x / Non Sq Epi: x / Bacteria: x

## 2024-03-04 NOTE — ED ADULT NURSE REASSESSMENT NOTE - COMFORT CARE
pt provided with supplies for stool sample collection with instruction. assistance offered if needed./plan of care explained

## 2024-03-05 ENCOUNTER — TRANSCRIPTION ENCOUNTER (OUTPATIENT)
Age: 39
End: 2024-03-05

## 2024-03-05 DIAGNOSIS — E87.6 HYPOKALEMIA: ICD-10-CM

## 2024-03-05 LAB
A1C WITH ESTIMATED AVERAGE GLUCOSE RESULT: 5.4 % — SIGNIFICANT CHANGE UP (ref 4–5.6)
ALBUMIN SERPL ELPH-MCNC: 1.9 G/DL — LOW (ref 3.3–5)
ALP SERPL-CCNC: 62 U/L — SIGNIFICANT CHANGE UP (ref 30–120)
ALT FLD-CCNC: 20 U/L — SIGNIFICANT CHANGE UP (ref 10–60)
ANION GAP SERPL CALC-SCNC: 9 MMOL/L — SIGNIFICANT CHANGE UP (ref 5–17)
AST SERPL-CCNC: 10 U/L — SIGNIFICANT CHANGE UP (ref 10–40)
BILIRUB SERPL-MCNC: 0.6 MG/DL — SIGNIFICANT CHANGE UP (ref 0.2–1.2)
BUN SERPL-MCNC: 3 MG/DL — LOW (ref 7–23)
CALCIUM SERPL-MCNC: 8 MG/DL — LOW (ref 8.4–10.5)
CHLORIDE SERPL-SCNC: 103 MMOL/L — SIGNIFICANT CHANGE UP (ref 96–108)
CO2 SERPL-SCNC: 26 MMOL/L — SIGNIFICANT CHANGE UP (ref 22–31)
CREAT SERPL-MCNC: 0.84 MG/DL — SIGNIFICANT CHANGE UP (ref 0.5–1.3)
CRP SERPL-MCNC: 205 MG/L — HIGH
EGFR: 116 ML/MIN/1.73M2 — SIGNIFICANT CHANGE UP
ERYTHROCYTE [SEDIMENTATION RATE] IN BLOOD: 44 MM/HR — HIGH (ref 0–9)
ESTIMATED AVERAGE GLUCOSE: 108 MG/DL — SIGNIFICANT CHANGE UP (ref 68–114)
ETEC DNA STL QL NAA+PROBE: DETECTED
GI PCR PANEL: DETECTED
GLUCOSE SERPL-MCNC: 178 MG/DL — HIGH (ref 70–99)
HCT VFR BLD CALC: 36.8 % — LOW (ref 39–50)
HGB BLD-MCNC: 12.1 G/DL — LOW (ref 13–17)
MCHC RBC-ENTMCNC: 29.1 PG — SIGNIFICANT CHANGE UP (ref 27–34)
MCHC RBC-ENTMCNC: 32.9 GM/DL — SIGNIFICANT CHANGE UP (ref 32–36)
MCV RBC AUTO: 88.5 FL — SIGNIFICANT CHANGE UP (ref 80–100)
NOROVIRUS GI+II RNA STL QL NAA+NON-PROBE: DETECTED
NRBC # BLD: 0 /100 WBCS — SIGNIFICANT CHANGE UP (ref 0–0)
PLATELET # BLD AUTO: 275 K/UL — SIGNIFICANT CHANGE UP (ref 150–400)
POTASSIUM SERPL-MCNC: 3.1 MMOL/L — LOW (ref 3.5–5.3)
POTASSIUM SERPL-SCNC: 3.1 MMOL/L — LOW (ref 3.5–5.3)
PROCALCITONIN SERPL-MCNC: 0.36 NG/ML — HIGH (ref 0.02–0.1)
PROT SERPL-MCNC: 5.6 G/DL — LOW (ref 6–8.3)
RBC # BLD: 4.16 M/UL — LOW (ref 4.2–5.8)
RBC # FLD: 12.2 % — SIGNIFICANT CHANGE UP (ref 10.3–14.5)
SODIUM SERPL-SCNC: 138 MMOL/L — SIGNIFICANT CHANGE UP (ref 135–145)
WBC # BLD: 11.51 K/UL — HIGH (ref 3.8–10.5)
WBC # FLD AUTO: 11.51 K/UL — HIGH (ref 3.8–10.5)
WRIGHT STN STL: POSITIVE

## 2024-03-05 PROCEDURE — 99222 1ST HOSP IP/OBS MODERATE 55: CPT

## 2024-03-05 PROCEDURE — 93010 ELECTROCARDIOGRAM REPORT: CPT

## 2024-03-05 RX ORDER — POTASSIUM CHLORIDE 20 MEQ
10 PACKET (EA) ORAL THREE TIMES A DAY
Refills: 0 | Status: DISCONTINUED | OUTPATIENT
Start: 2024-03-05 | End: 2024-03-05

## 2024-03-05 RX ORDER — ACETAMINOPHEN 500 MG
650 TABLET ORAL EVERY 6 HOURS
Refills: 0 | Status: DISCONTINUED | OUTPATIENT
Start: 2024-03-05 | End: 2024-03-06

## 2024-03-05 RX ORDER — LACTOBACILLUS ACIDOPHILUS 100MM CELL
1 CAPSULE ORAL
Qty: 0 | Refills: 0 | DISCHARGE
Start: 2024-03-05

## 2024-03-05 RX ORDER — HEPARIN SODIUM 5000 [USP'U]/ML
5000 INJECTION INTRAVENOUS; SUBCUTANEOUS EVERY 8 HOURS
Refills: 0 | Status: DISCONTINUED | OUTPATIENT
Start: 2024-03-05 | End: 2024-03-06

## 2024-03-05 RX ORDER — POTASSIUM CHLORIDE 20 MEQ
10 PACKET (EA) ORAL
Refills: 0 | Status: COMPLETED | OUTPATIENT
Start: 2024-03-05 | End: 2024-03-05

## 2024-03-05 RX ORDER — DEXTROSE MONOHYDRATE, SODIUM CHLORIDE, AND POTASSIUM CHLORIDE 50; .745; 4.5 G/1000ML; G/1000ML; G/1000ML
1000 INJECTION, SOLUTION INTRAVENOUS
Refills: 0 | Status: DISCONTINUED | OUTPATIENT
Start: 2024-03-05 | End: 2024-03-06

## 2024-03-05 RX ADMIN — Medication 100 MILLIGRAM(S): at 22:06

## 2024-03-05 RX ADMIN — Medication 100 MILLIGRAM(S): at 14:04

## 2024-03-05 RX ADMIN — DEXTROSE MONOHYDRATE, SODIUM CHLORIDE, AND POTASSIUM CHLORIDE 75 MILLILITER(S): 50; .745; 4.5 INJECTION, SOLUTION INTRAVENOUS at 17:39

## 2024-03-05 RX ADMIN — Medication 100 MILLIGRAM(S): at 07:29

## 2024-03-05 RX ADMIN — HEPARIN SODIUM 5000 UNIT(S): 5000 INJECTION INTRAVENOUS; SUBCUTANEOUS at 17:06

## 2024-03-05 RX ADMIN — Medication 100 MILLIEQUIVALENT(S): at 17:26

## 2024-03-05 RX ADMIN — Medication 200 MILLIGRAM(S): at 17:06

## 2024-03-05 RX ADMIN — Medication 1 TABLET(S): at 11:23

## 2024-03-05 RX ADMIN — CHOLESTYRAMINE 4 GRAM(S): 4 POWDER, FOR SUSPENSION ORAL at 08:08

## 2024-03-05 RX ADMIN — Medication 650 MILLIGRAM(S): at 00:30

## 2024-03-05 RX ADMIN — HEPARIN SODIUM 5000 UNIT(S): 5000 INJECTION INTRAVENOUS; SUBCUTANEOUS at 06:02

## 2024-03-05 RX ADMIN — HEPARIN SODIUM 5000 UNIT(S): 5000 INJECTION INTRAVENOUS; SUBCUTANEOUS at 22:06

## 2024-03-05 RX ADMIN — Medication 100 MILLIEQUIVALENT(S): at 20:17

## 2024-03-05 RX ADMIN — Medication 650 MILLIGRAM(S): at 01:45

## 2024-03-05 RX ADMIN — SODIUM CHLORIDE 100 MILLILITER(S): 9 INJECTION INTRAMUSCULAR; INTRAVENOUS; SUBCUTANEOUS at 06:01

## 2024-03-05 RX ADMIN — Medication 100 MILLIEQUIVALENT(S): at 19:13

## 2024-03-05 RX ADMIN — SERTRALINE 100 MILLIGRAM(S): 25 TABLET, FILM COATED ORAL at 11:24

## 2024-03-05 RX ADMIN — Medication 200 MILLIGRAM(S): at 06:01

## 2024-03-05 RX ADMIN — DEXTROSE MONOHYDRATE, SODIUM CHLORIDE, AND POTASSIUM CHLORIDE 75 MILLILITER(S): 50; .745; 4.5 INJECTION, SOLUTION INTRAVENOUS at 22:06

## 2024-03-05 RX ADMIN — Medication 10 MILLIEQUIVALENT(S): at 11:23

## 2024-03-05 NOTE — DIETITIAN INITIAL EVALUATION ADULT - REASON FOR ADMISSION
HPI:  Patient complaining of 2 weeks of left lower quadrant abdominal pain associated with watery diarrhea and mild nausea.  Patient denies fever vomiting chest pain short breath cough dysuria hematuria frequency.  Patient denies sick contacts recent hospitalizations or antibiotic usage.  In ER patient was found to have pan colitis.  patient is being admitted for further work up and treatment. (04 Mar 2024 18:06)

## 2024-03-05 NOTE — PROGRESS NOTE ADULT - ASSESSMENT
diarrhea  abdominal pain fevers    pan colitis on CT   ?infectious vs ?inflammatory vs ?ischemic    PLAN  IBD labs pending  stool studies pending   bacid one tab tid  low residue lactose free diet  if stool negative consider imodium  monitor CBC  replete electrolytes as needed   Will eventually need outpatient colonoscopy.   will follow    Advanced care planning was discussed with patient and family.  Advanced care planning forms were reviewed and discussed.  Risks, benefits and alternatives of gastroenterologic procedures were discussed in detail and all questions were answered.    30 minutes spent.   Discussed with Dr. William.    diarrhea  abdominal pain fevers    pan colitis on CT   ?infectious vs ?inflammatory vs ?ischemic    PLAN  IBD labs pending  stool studies pending   bacid one tab tid  low residue lactose free diet  if stool negative consider imodium  continue abx  continue IVF  monitor CBC  replete electrolytes as needed   Will eventually need outpatient colonoscopy.   will follow    Advanced care planning was discussed with patient and family.  Advanced care planning forms were reviewed and discussed.  Risks, benefits and alternatives of gastroenterologic procedures were discussed in detail and all questions were answered.    30 minutes spent.   Discussed with Dr. William.

## 2024-03-05 NOTE — CHART NOTE - NSCHARTNOTEFT_GEN_A_CORE
Do you have Advance Directives (HCP / LV / Organ donation / Documentation of oral advance Directive):   (  x  )  yes    (      )    NO                                                                            Do you have LV - Living will :                                                                                                                                             (   x )  yes    (      )   No    Do you have HCP - Health Care Proxy:                                                                                                                            (   x  )  yes   (       ) N0    Do you have DNR- Do Not Resuscitate :                                                                                                                           (      )  yes  (     x   )  No    Do you have DNI- Do Not intubate  :                                                                                                                               (      )  yes   (    x   ) No    Do you have MOLST - Medical orders for Life sustaining treatment  :                                                                    (      ) yes    (    x   ) No    Decision Maker :  (  x   ) Patient     (      )  [facility]CA   (     ) Public Health Law Surrogate     (      ) Surrogate  (       ) Guardian    Goals of Care :  (    x  )   Complete Care     (       ) No Limitations                              (       )   Comfort Care       (       )  Hospice                               (      )   Limited medical Intervention / s    Medical Interventions :   (    x    )   CPR       (        )  DNR                                               (   x     )  Intubation with MV - Mechanical Ventilation  (  x    ) BIPAP/CPAP    (         )   DNI                                               (   x      )  Artificial Nutrition -  IVF, TPN / PPN, Tube Feeds             (         )   No Feeding Tube                                                (    x    ) Use Antibiotics                         (          ) No Antibiotics                                                (    x     ) Blood and Blood Products     (         )   No Blood or Blood products                                                (     x     )  Dialysis                                    (         )  No Dialysis                                                (          )  Medical Management only  (         )  No Invasive Interventions or Surgery  Time spent :                        (    x   ) upto 30 minutes                       (           )   more than 30 minutes  ACP reviewed and discussed

## 2024-03-05 NOTE — PATIENT PROFILE ADULT - NSPRESCRUSEDDRG_GEN_A_NUR
History     Chief Complaint   Patient presents with    Post-op Problem     HPI    History obtained from motherIke Aceves is a(n) 9 year old previously healthy male who presents at  1:13 PM with sore throat for the past ten days.  Tonsillectomy/adenoidectomy at [facility] on Monday 12/11/2023) and has had sore throat since that time.  Minimal improvement with oxycodone (1.4mL); prescription for that has since run out.  Has been getting ibuprofen and Tylenol  (8mL of each) without improvement.  Poor p.o. intake due to throat pain.  Subjective fever.  Intermittent cough.  No congestion or rhinorrhea.  No ear pain.  No shortness of breath.  No sick contacts.  Has been out of school since surgery.  Presents today with concern for ongoing throat pain.    PMHx:  No past medical history on file.  No past surgical history on file.  These were reviewed with the patient/family.    MEDICATIONS were reviewed and are as follows:   No current facility-administered medications for this encounter.     Current Outpatient Medications   Medication    acetaminophen (TYLENOL) 160 MG/5ML liquid    ibuprofen (ADVIL/MOTRIN) 100 MG/5ML suspension    oxyCODONE (ROXICODONE) 5 MG/5ML solution       ALLERGIES:  Patient has no known allergies.  IMMUNIZATIONS: UTD per [facility]   SOCIAL HISTORY: out of school since surgery, otherwise at school       Physical Exam   Pulse: 86  Temp: 99.2  F (37.3  C)  Resp: 24  Weight: 28 kg (61 lb 11.7 oz)  SpO2: 100 %       Physical Exam  Constitutional:       General: He is active. He is not in acute distress.     Appearance: He is well-developed. He is not toxic-appearing.   HENT:      Head: Normocephalic and atraumatic.      Right Ear: Tympanic membrane normal.      Left Ear: Tympanic membrane normal.      Nose: Nose normal. No congestion or rhinorrhea.      Mouth/Throat:      Mouth: Mucous membranes are moist.      Tonsils: No tonsillar exudate or tonsillar abscesses.      Comments:  Bilateral  eschar consistent with reported surgical history, no signs of bleeding or infection, no clot. No other oral or pharyngeal lesions.   Eyes:      Conjunctiva/sclera: Conjunctivae normal.      Pupils: Pupils are equal, round, and reactive to light.   Cardiovascular:      Rate and Rhythm: Normal rate and regular rhythm.      Pulses: Normal pulses.      Heart sounds: Normal heart sounds.   Pulmonary:      Effort: Pulmonary effort is normal.      Breath sounds: Normal breath sounds.   Abdominal:      General: Abdomen is flat.      Palpations: Abdomen is soft.      Tenderness: There is no abdominal tenderness. There is no guarding or rebound.   Musculoskeletal:         General: Normal range of motion.      Cervical back: Normal range of motion and neck supple. No rigidity or tenderness.   Lymphadenopathy:      Cervical: No cervical adenopathy.   Skin:     General: Skin is warm and dry.      Capillary Refill: Capillary refill takes less than 2 seconds.   Neurological:      General: No focal deficit present.      Mental Status: He is alert.             Procedures    Results for orders placed or performed during the hospital encounter of 12/17/23   Basic metabolic panel     Status: None   Result Value Ref Range    Sodium 139 135 - 145 mmol/L    Potassium 4.4 3.4 - 5.3 mmol/L    Chloride 102 98 - 107 mmol/L    Carbon Dioxide (CO2) 25 22 - 29 mmol/L    Anion Gap 12 7 - 15 mmol/L    Urea Nitrogen 8.3 5.0 - 18.0 mg/dL    Creatinine 0.35 0.29 - 0.47 mg/dL    GFR Estimate      Calcium 9.5 8.8 - 10.8 mg/dL    Glucose 90 70 - 99 mg/dL   Procalcitonin     Status: Normal   Result Value Ref Range    Procalcitonin 0.04 <0.50 ng/mL   CBC with platelets and differential     Status: None   Result Value Ref Range    WBC Count 5.1 5.0 - 14.5 10e3/uL    RBC Count 4.67 3.70 - 5.30 10e6/uL    Hemoglobin 12.6 10.5 - 14.0 g/dL    Hematocrit 37.9 31.5 - 43.0 %    MCV 81 70 - 100 fL    MCH 27.0 26.5 - 33.0 pg    MCHC 33.2 31.5 - 36.5 g/dL    RDW  12.7 10.0 - 15.0 %    Platelet Count 374 150 - 450 10e3/uL    % Neutrophils 44 %    % Lymphocytes 42 %    % Monocytes 11 %    % Eosinophils 2 %    % Basophils 1 %    % Immature Granulocytes 0 %    NRBCs per 100 WBC 0 <1 /100    Absolute Neutrophils 2.3 1.3 - 8.1 10e3/uL    Absolute Lymphocytes 2.1 1.1 - 8.6 10e3/uL    Absolute Monocytes 0.6 0.0 - 1.1 10e3/uL    Absolute Eosinophils 0.1 0.0 - 0.7 10e3/uL    Absolute Basophils 0.0 0.0 - 0.2 10e3/uL    Absolute Immature Granulocytes 0.0 <=0.4 10e3/uL    Absolute NRBCs 0.0 10e3/uL   CBC with platelets differential     Status: None    Narrative    The following orders were created for panel order CBC with platelets differential.  Procedure                               Abnormality         Status                     ---------                               -----------         ------                     CBC with platelets and d...[[id number]]                      Final result                 Please view results for these tests on the individual orders.       Medications   morphine (PF) injection 2 mg (2 mg Intravenous $Given 12/17/23 1418)   sodium chloride 0.9% BOLUS 560 mL (0 mLs Intravenous Stopped 12/17/23 1518)   acetaminophen (TYLENOL) solution 416 mg (416 mg Oral $Given 12/17/23 1459)       Critical care time:  none        Medical Decision Making  The patient's presentation was of moderate complexity (an acute illness with systemic symptoms).    The patient's evaluation involved:  an assessment requiring an independent historian (see separate area of note for details)  ordering and/or review of 3+ test(s) in this encounter (see separate area of note for details)    The patient's management necessitated high risk (a parenteral controlled substance) and high risk (a decision regarding hospitalization).        Assessment & Plan   Yola is a(n) 9 year old previously healthy male who presents with sore throat, poor PO intake, subjective fever since reported  tonsillectomy/adenoidectomy on Monday, 12/11/2023.  Sore throat has been persistent postop despite short course of oxycodone, and ongoing ibuprofen and Tylenol.  Subjective fevers.  No objective measurement.  No true fever here in the ED.  On exam, no stridor or evidence of airway compromise.  Normal work of breathing.  Lungs are clear.  Not drooling.  Handling oral secretions well.  Some discomfort with palpation of the anterior neck.  No overt pain with tracheal rocking.  Ranging the neck in both directions spontaneously.  No cervical lymphadenopathy.  Limited mouth opening secondary to pain, ongoing despite coaching.  Biting down on tongue depressor.  I am unable to obtain an adequate posterior pharyngeal exam.  Got a dose of ibuprofen and Tylenol at 11:00 prior to arrival.  Given poor PO intake and fever post op, will obtain CMP, CBC, procal, blood culture, IVF bolus, morphine 2 mg.     ED Course          ED Course as of 12/17/23 1607   Sun Dec 17, 2023   1412 Temp: 99.2  F (37.3  C)   1414 Pulse: 86   1414 Resp: 24   1414 SpO2: 100 %   1414 Weight: 28 kg (61 lb 11.7 oz)   1435 CBC with platelets differential  Normal CBC   1436 WBC: 5.1   1436 Hemoglobin: 12.6   1454 Pain improved with morphine, but continuing to complain of pain, mother very concerned about degree of pain. On review of weight, have been under-dosing of APAP and ibuprofen at home. He can have 15 mL each children's APAP and ibuprofen, recommended alternating each q3 hours. Plan for weight based dose of APAP, PO challenge. If unable to PO challenge and/or pain remains uncontrolled, will plan admission for hydration, pain control, ENT evaluation.    1458 Basic metabolic panel  Normal BMP   1459 Procalcitonin: 0.04  Low suspicion for acute bacterial infection.  Surgical sites appear as expected postop.  No evidence of bleeding.  No clot.   1601 Feeling better after morphine and acetaminophen.Taking PO without difficulty.  Reviewed weight-based  dosing of oxycodone, acetaminophen, ibuprofen.  Recommended mother alternate ibuprofen and acetaminophen every 3 hours for optimal pain control.  Strongly recommended calling ENT clinic tomorrow to get an appointment for follow-up as soon as possible.  Discussed return precautions including inability to take p.o., severe pain, intractable vomiting, high fevers, inability to handle oral secretions, bleeding, or any other new or worrisome symptoms.  Expressed understanding of when to return to the ED.  Prescriptions for medications were provided on paper per mother's request.  Discharged in good condition after all questions were answered.     Discharge Medication List as of 12/17/2023  3:56 PM        START taking these medications    Details   acetaminophen (TYLENOL) 160 MG/5ML liquid Take 13 mLs (416 mg) by mouth every 6 hours as needed for mild pain or fever, Disp-100 mL, R-0, Local Print      ibuprofen (ADVIL/MOTRIN) 100 MG/5ML suspension Take 15 mLs (300 mg) by mouth every 6 hours as needed for fever or moderate pain, Disp-100 mL, R-0, Local Print      oxyCODONE (ROXICODONE) 5 MG/5ML solution Take 2.5 mLs (2.5 mg) by mouth every 6 hours as needed for pain, Disp-30 mL, R-0, Local Print             Final diagnoses:   Acute pharyngitis, unspecified etiology   Acute post-operative pain     Dennis Parks MD  Emergency Medicine PGY-2  December 17, 2023 4:08 PM     This data was collected with the resident physician working in the Emergency Department. I saw and evaluated the patient and repeated the key portions of the history and physical exam. The plan of care has been discussed with the patient and family by me or by the resident under my supervision. I have read and edited the entire note. Patrizia Hernandez MD    Portions of this note may have been created using voice recognition software. Please excuse transcription errors.     12/17/2023   [facility] EMERGENCY DEPARTMENT     Patrizia Hernandez  MD  12/20/23 1031     No

## 2024-03-05 NOTE — PATIENT PROFILE ADULT - FALL HARM RISK - UNIVERSAL INTERVENTIONS
Bed in lowest position, wheels locked, appropriate side rails in place/Call bell, personal items and telephone in reach/Instruct patient to call for assistance before getting out of bed or chair/Non-slip footwear when patient is out of bed/[place] to call system/Physically safe environment - no spills, clutter or unnecessary equipment/Purposeful Proactive Rounding/Room/bathroom lighting operational, light cord in reach

## 2024-03-05 NOTE — PATIENT PROFILE ADULT - FUNCTIONAL ASSESSMENT - BASIC MOBILITY SCORE.
Render In Strict Bullet Format?: No Detail Level: Zone Continue Regimen: Alclometasone 0.05% ointment bid x 2 weeks for rash flares. 24

## 2024-03-05 NOTE — DIETITIAN INITIAL EVALUATION ADULT - ORAL INTAKE PTA/DIET HISTORY
Reported not following any therapeutic diet at home, appetite/po intake was good up until 1 week ago. Was doing  for a while then stop long time ago. No vitamin/mineral or other nutrition supplements reported.

## 2024-03-05 NOTE — DIETITIAN INITIAL EVALUATION ADULT - ETIOLOGY
related to inability to consume sufficient protein/energy secondary to acute illness/altered GI function

## 2024-03-05 NOTE — DIETITIAN INITIAL EVALUATION ADULT - OTHER INFO
Visited patient in room, presently NPO/clear liquid diet day 2. Denies n/v/c, but diarrhea last BM today x3 times. No reported difficulty chewing or swallowing. NKFA. Reported #, current adm weight 220#, 10#/4% wt loss in 1-2 weeks is clinically significant, will continue to monitor weight trends as able.     Pertinent medications/nutrition labs reviewed; receiving multivitamin, antibiotic in house.     Briefly explained typical diet progression to pt: clear liquid->full liquid ->low fat or low fiber. Will follow up with in dept diet education when appropriate. RD to continue to monitor nutrition status per protocol.

## 2024-03-05 NOTE — DIETITIAN INITIAL EVALUATION ADULT - PERTINENT LABORATORY DATA
03-04    135  |  98  |  5<L>  ----------------------------<  144<H>  3.5   |  30  |  1.03    Ca    8.6      04 Mar 2024 15:36    TPro  6.8  /  Alb  2.4<L>  /  TBili  0.8  /  DBili  x   /  AST  10  /  ALT  19  /  AlkPhos  82  03-04  A1C with Estimated Average Glucose Result: 5.4 % (03-05-24 @ 06:00)

## 2024-03-05 NOTE — DIETITIAN INITIAL EVALUATION ADULT - PERTINENT MEDS FT
MEDICATIONS  (STANDING):  cholestyramine Powder (Sugar-Free) 4 Gram(s) Oral daily  ciprofloxacin   IVPB 400 milliGRAM(s) IV Intermittent every 12 hours  heparin   Injectable 5000 Unit(s) SubCutaneous every 12 hours  lactobacillus acidophilus 1 Tablet(s) Oral daily  metroNIDAZOLE  IVPB 500 milliGRAM(s) IV Intermittent every 8 hours  multivitamin 1 Tablet(s) Oral daily  potassium chloride    Tablet ER 10 milliEquivalent(s) Oral daily  sertraline 100 milliGRAM(s) Oral daily  sodium chloride 0.9%. 1000 milliLiter(s) (100 mL/Hr) IV Continuous <Continuous>    MEDICATIONS  (PRN):  acetaminophen   Oral Liquid .. 650 milliGRAM(s) Oral every 6 hours PRN Temp greater or equal to 38C (100.4F), Mild Pain (1 - 3)  ondansetron Injectable 4 milliGRAM(s) IV Push four times a day PRN Nausea and/or Vomiting

## 2024-03-05 NOTE — DISCHARGE NOTE PROVIDER - HOSPITAL COURSE
admitted for pan colitis  empiric abx per ID  diet advance as tolerated  DC after ID and GI clearance admitted for pan colitis  2/2 to Norovirus and E. coli  D/C abx  Supportive care  Clinically improving   Imodium prn  Diet as tolerated  D/C home    >35 minutes spent on discharge

## 2024-03-05 NOTE — DISCHARGE NOTE PROVIDER - NSDCCPCAREPLAN_GEN_ALL_CORE_FT
PRINCIPAL DISCHARGE DIAGNOSIS  Diagnosis: Pancolitis  Assessment and Plan of Treatment: follow up with GI MD Dr. SAEED     PRINCIPAL DISCHARGE DIAGNOSIS  Diagnosis: Pancolitis  Assessment and Plan of Treatment: follow up with GI MD Dr. CASILLAS  Take Imodium as needed

## 2024-03-05 NOTE — DIETITIAN INITIAL EVALUATION ADULT - ADD RECOMMEND
1. Continue with current diet order  2. Provide ONS pending GI tolerance  3. Encourage po intake as needed   4. Provide diet education as able

## 2024-03-05 NOTE — CONSULT NOTE ADULT - SUBJECTIVE AND OBJECTIVE BOX
[facility]  INFECTIOUS DISEASES   [address] [place] [address]  Tel: [phone]     Fax: [phone]  ========================================================  MD Singh Guerra Kaushal, MD Cho, Michelle, MD Sunjit, Jaspal, MD  ========================================================    [id number]  MARGRET GRANDA     CC: Diarrhea   37yo man with PMH of anxiety and depression was admitted with severe diarrhea and abdominal pain. No vomiting but was nauseous, currently tolerating liquid diet.   No respiratory symptoms. No fever or chills. Since in ED had 2-3 diarrhea less than yesterday but abdominal pain is better.   Always eat out, recently had some leftover food. No raw meat lately but usually eats sushi frequently. No recent travel, no sick contact.   Lives with his dad who doesn't have any diarrhea. No pets.     PAST MEDICAL & SURGICAL HISTORY:  Anxiety and depression  No significant past surgical history    Social Hx: No current smoking, EtOH or drugs     FAMILY HISTORY:  Noncontributory     Allergies  No Known Allergies    MEDICATIONS  (STANDING):  cholestyramine Powder (Sugar-Free) 4 Gram(s) Oral daily  ciprofloxacin   IVPB 400 milliGRAM(s) IV Intermittent every 12 hours  heparin   Injectable 5000 Unit(s) SubCutaneous every 12 hours  lactobacillus acidophilus 1 Tablet(s) Oral daily  metroNIDAZOLE  IVPB 500 milliGRAM(s) IV Intermittent every 8 hours  multivitamin 1 Tablet(s) Oral daily  potassium chloride    Tablet ER 10 milliEquivalent(s) Oral daily  sertraline 100 milliGRAM(s) Oral daily  sodium chloride 0.9%. 1000 milliLiter(s) (100 mL/Hr) IV Continuous <Continuous>    MEDICATIONS  (PRN):  acetaminophen   Oral Liquid .. 650 milliGRAM(s) Oral every 6 hours PRN Temp greater or equal to 38C (100.4F), Mild Pain (1 - 3)  ondansetron Injectable 4 milliGRAM(s) IV Push four times a day PRN Nausea and/or Vomiting     REVIEW OF SYSTEMS:  CONSTITUTIONAL:  No Fever or chills  HEENT:  No diplopia or blurred vision.  No sore throat or runny nose.  CARDIOVASCULAR:  No chest pain   RESPIRATORY:  No cough, shortness of breath, PND or orthopnea.  GASTROINTESTINAL:  + nausea, + diarrhea and abd pain  GENITOURINARY:  No dysuria, frequency or urgency. No Blood in urine  MUSCULOSKELETAL:  no joint aches, no muscle pain  SKIN:  No change in skin, hair or nails.    Physical Exam:  Vital Signs Last 24 Hrs  T(C): 37.1 (05 Mar 2024 16:02), Max: 38.7 (04 Mar 2024 22:51)  T(F): 98.7 (05 Mar 2024 16:02), Max: 101.7 (04 Mar 2024 22:51)  HR: 93 (05 Mar 2024 16:02) (88 - 103)  BP: 114/69 (05 Mar 2024 16:02) (107/70 - 123/80)  RR: 18 (05 Mar 2024 16:02) (17 - 18)  SpO2: 96% (05 Mar 2024 16:02) (96% - 99%)  Parameters below as of 05 Mar 2024 16:02  Patient On (Oxygen Delivery Method): room air  GEN: NAD  HEENT: normocephalic and atraumatic. EOMI. PERRL.    NECK: Supple.  No lymphadenopathy   LUNGS: Clear to auscultation.  HEART: Regular rate and rhythm   ABDOMEN: Soft, nontender, and nondistended.  Positive bowel sounds.    : No CVA tenderness  EXTREMITIES: Without edema.  NEUROLOGIC: grossly intact.  PSYCHIATRIC: Appropriate affect .  SKIN: No rash     Labs:  03-05    138  |  103  |  3<L>  ----------------------------<  178<H>  3.1<L>   |  26  |  0.84    Ca    8.0<L>      05 Mar 2024 16:17    TPro  5.6<L>  /  Alb  1.9<L>  /  TBili  0.6  /  DBili  x   /  AST  10  /  ALT  20  /  AlkPhos  62  03-05                        12.1   11.51 )-----------( 275      ( 05 Mar 2024 06:00 )             36.8     PT/INR - ( 04 Mar 2024 15:36 )   PT: 14.1 sec;   INR: 1.27 ratio    PTT - ( 04 Mar 2024 15:36 )  PTT:29.4 sec  Urinalysis Basic - ( 05 Mar 2024 16:17 )    Color: x / Appearance: x / SG: x / pH: x  Gluc: 178 mg/dL / Ketone: x  / Bili: x / Urobili: x   Blood: x / Protein: x / Nitrite: x   Leuk Esterase: x / RBC: x / WBC x   Sq Epi: x / Non Sq Epi: x / Bacteria: x    LIVER FUNCTIONS - ( 05 Mar 2024 16:17 )  Alb: 1.9 g/dL / Pro: 5.6 g/dL / ALK PHOS: 62 U/L / ALT: 20 U/L / AST: 10 U/L / GGT: x           Procalcitonin, Serum: 0.36 ng/mL (03-05-24 @ 06:00)    C-Reactive Protein, Serum: 205 mg/L (03-05-24 @ 06:00)    Sedimentation Rate, Erythrocyte: 44 mm/Hr (03-05-24 @ 06:00)    SARS-CoV-2 Result: NotDetec (03-04-24 @ 15:36)    RECENT CULTURES:  03-04 @ 21:10 .Stool Feces     Testing in progress    All imaging and other data have been reviewed.  < from: CT Abdomen and Pelvis w/ IV Cont (03.04.24 @ 15:55) >  ACC: [id number] EXAM:  CT ABDOMEN AND PELVIS IC   ORDERED BY: ANDREW MALDONADO   PROCEDURE DATE:  03/04/2024    INTERPRETATION:  CLINICAL INFORMATION: Left abdominal pain, fever, and   diarrhea  COMPARISON: None.  CONTRAST/COMPLICATIONS:  IV Contrast: Omnipaque 350  92 cc administered   8 cc discarded  Oral Contrast: NONE  Complications: None reported at time of study completion  PROCEDURE:  CT of the Abdomen and Pelvis was performed.  Sagittal and coronal reformats were performed.  FINDINGS:  LOWER CHEST: Clear lung bases.  LIVER: Subcentimeter left hepatic hypodensity, too small to further   characterize.  BILE DUCTS: Normal caliber.  GALLBLADDER: Within normal limits.  SPLEEN: Within normal limits.  PANCREAS: Within normal limits.  ADRENALS: Within normal limits.  KIDNEYS/URETERS: Subcentimeter renal hypodensities, too small to further   characterize. No hydronephrosis.  BLADDER: Within normal limits.  REPRODUCTIVE ORGANS: Prostate within normal limits.  BOWEL: No bowel obstruction. Appendix is within normal limits. Diffuse   colonic wall thickening and inflammation.  PERITONEUM: Trace pelvic fluid.  VESSELS: Within normal limits.  RETROPERITONEUM/LYMPH NODES: No lymphadenopathy. Prominent mesenteric and   retroperitoneal lymph nodes.  ABDOMINAL WALL: Tiny fat-containing umbilical hernia.  BONES: Left L5 spondylolysis.  IMPRESSION:  Pancolitis.      Assessment and Plan:   37yo man with PMH of anxiety and depression was admitted with severe diarrhea and abdominal pain. No vomiting but was nauseous, currently tolerating liquid diet.   No respiratory symptoms. No fever or chills. Since in ED had 2-3 diarrhea less than yesterday but abdominal pain is better.   Always eat out, recently had some leftover food. No raw meat lately but usually eats sushi frequently. No recent travel, no sick contact.   Lives with his dad who doesn't have any diarrhea. No pets.     # Gastroenteritis and colitis   - Will follow blood cultures   - GI PCR is pending  - Can continue cipro and flagyl for now   - Will modify treatment based on GI PCR and culture results   - GI consult has been done  - Monitor Tmax and WBC=11    Thank you for courtesy of this consult.     Will follow.  Discussed with the primary team.     Pablo Astorga MD  Division of Infectious Diseases   Please call ID service at [phone] with any question.    75 minutes spent on total encounter assessing patient, examination, chart review, counseling and coordinating care by the attending physician/nurse/care manager.  
  Chief Complaint:  Patient is a 38y old  Male who presents with a chief complaint of     HPI:  38 M presenting for eval of 2 weeks nausea, abdominal pain, diarrhea. No known sick contacts, travel, abx use. Does work as a  in a high school. Reports 2 weeks ago when diarrhea started it was "bloody". Had pancolitis on CT scan today. Pt denies headache, dizziness, chest pain, palpitations, cough, SOB, urinary symptoms, weakness at this time. Has never had EGD/Colon.    Allergies:  No Known Allergies      Medications:      PMHX/PSHX:  Anxiety and depression    No significant past surgical history        Family history:      Social History:     ROS:     General:  No wt loss, fevers, chills, night sweats, fatigue,   Eyes:  Good vision, no reported pain  ENT:  No sore throat, pain, runny nose, dysphagia  CV:  No pain, palpitations, hypo/hypertension  Resp:  No dyspnea, cough, tachypnea, wheezing  GI:  see HPI  :  No pain, bleeding, incontinence, nocturia  Muscle:  No pain, weakness  Neuro:  No weakness, tingling, memory problems  Psych:  No fatigue, insomnia, mood problems, depression  Endocrine:  No polyuria, polydipsia, cold/heat intolerance  Heme:  No petechiae, ecchymosis, easy bruisability  Skin:  No rash, tattoos, scars, edema      PHYSICAL EXAM:   Vital Signs:  Vital Signs Last 24 Hrs  T(C): 38.3 (04 Mar 2024 14:32), Max: 38.3 (04 Mar 2024 14:32)  T(F): 100.9 (04 Mar 2024 14:32), Max: 100.9 (04 Mar 2024 14:32)  HR: 116 (04 Mar 2024 14:32) (116 - 116)  BP: 140/83 (04 Mar 2024 14:32) (140/83 - 140/83)  BP(mean): --  RR: 20 (04 Mar 2024 14:32) (20 - 20)  SpO2: 99% (04 Mar 2024 14:32) (99% - 99%)    Parameters below as of 04 Mar 2024 14:32  Patient On (Oxygen Delivery Method): room air      Daily Height in cm: 190.5 (04 Mar 2024 14:32)    Daily     GENERAL:  Appears stated age, well-groomed, well-nourished, no distress  HEENT:  NC/AT,  conjunctivae clear and pink, no thyromegaly, nodules, adenopathy, no JVD, sclera -anicteric  CHEST:  Full & symmetric excursion, no increased effort, breath sounds clear  HEART:  Regular rhythm, S1, S2, no murmur/rub/S3/S4, no abdominal bruit, no edema  ABDOMEN:  Soft, +tender, non-distended, normoactive bowel sounds,  no masses ,no hepato-splenomegaly, no signs of chronic liver disease  EXTEREMITIES:  no cyanosis,clubbing or edema  SKIN:  No rash/erythema/ecchymoses/petechiae/wounds/abscess/warm/dry  NEURO:  Alert, oriented, no asterixis, no tremor, no encephalopathy    LABS:                        13.5   11.62 )-----------( 285      ( 04 Mar 2024 15:36 )             40.0     03-04    135  |  98  |  5<L>  ----------------------------<  144<H>  3.5   |  30  |  1.03    Ca    8.6      04 Mar 2024 15:36    TPro  6.8  /  Alb  2.4<L>  /  TBili  0.8  /  DBili  x   /  AST  10  /  ALT  19  /  AlkPhos  82  03-04    LIVER FUNCTIONS - ( 04 Mar 2024 15:36 )  Alb: 2.4 g/dL / Pro: 6.8 g/dL / ALK PHOS: 82 U/L / ALT: 19 U/L / AST: 10 U/L / GGT: x           PT/INR - ( 04 Mar 2024 15:36 )   PT: 14.1 sec;   INR: 1.27 ratio         PTT - ( 04 Mar 2024 15:36 )  PTT:29.4 sec  Urinalysis Basic - ( 04 Mar 2024 15:36 )    Color: x / Appearance: x / SG: x / pH: x  Gluc: 144 mg/dL / Ketone: x  / Bili: x / Urobili: x   Blood: x / Protein: x / Nitrite: x   Leuk Esterase: x / RBC: x / WBC x   Sq Epi: x / Non Sq Epi: x / Bacteria: x          Imaging:

## 2024-03-05 NOTE — PATIENT PROFILE ADULT - DO YOU LACK THE NECESSARY SUPPORT TO HELP YOU COPE WITH LIFE CHALLENGES?
Recommendation Preamble: The following recommendations were made during the visit: no Detail Level: Zone Recommendations (Free Text): Recommend LHR for inner thighs

## 2024-03-05 NOTE — DISCHARGE NOTE PROVIDER - CARE PROVIDER_API CALL
Levy Morris  Gastroenterology  [address] [place] [address]  Phone: [phone]  Fax: [phone]  Follow Up Time:     Case Wade  Internal Medicine  [address] [facility] [address], [address]  [place] [address]  Phone: [phone]  Fax: [phone]  Follow Up Time:

## 2024-03-05 NOTE — DISCHARGE NOTE PROVIDER - NSDCMRMEDTOKEN_GEN_ALL_CORE_FT
lactobacillus acidophilus oral capsule: 1 cap(s) orally once a day  Multiple Vitamins oral tablet: 1 tab(s) orally once a day  sertraline 100 mg oral tablet: 1 tab(s) orally   lactobacillus acidophilus oral capsule: 1 cap(s) orally once a day  loperamide 2 mg oral capsule: 1 cap(s) orally 4 times a day As needed Diarrhea  Multiple Vitamins oral tablet: 1 tab(s) orally once a day  sertraline 100 mg oral tablet: 1 tab(s) orally once a day

## 2024-03-06 ENCOUNTER — TRANSCRIPTION ENCOUNTER (OUTPATIENT)
Age: 39
End: 2024-03-06

## 2024-03-06 VITALS
TEMPERATURE: 98 F | DIASTOLIC BLOOD PRESSURE: 78 MMHG | HEART RATE: 99 BPM | RESPIRATION RATE: 16 BRPM | OXYGEN SATURATION: 96 % | SYSTOLIC BLOOD PRESSURE: 124 MMHG

## 2024-03-06 LAB
ALBUMIN SERPL ELPH-MCNC: 1.7 G/DL — LOW (ref 3.3–5)
ALP SERPL-CCNC: 59 U/L — SIGNIFICANT CHANGE UP (ref 30–120)
ALT FLD-CCNC: 12 U/L — SIGNIFICANT CHANGE UP (ref 10–60)
ANION GAP SERPL CALC-SCNC: 7 MMOL/L — SIGNIFICANT CHANGE UP (ref 5–17)
AST SERPL-CCNC: 10 U/L — SIGNIFICANT CHANGE UP (ref 10–40)
AUTO DIFF PNL BLD: ABNORMAL
BILIRUB SERPL-MCNC: 0.6 MG/DL — SIGNIFICANT CHANGE UP (ref 0.2–1.2)
BUN SERPL-MCNC: 2 MG/DL — LOW (ref 7–23)
C DIFF BY PCR RESULT: SIGNIFICANT CHANGE UP
C-ANCA SER-ACNC: NEGATIVE — SIGNIFICANT CHANGE UP
CALCIUM SERPL-MCNC: 8.2 MG/DL — LOW (ref 8.4–10.5)
CHLORIDE SERPL-SCNC: 104 MMOL/L — SIGNIFICANT CHANGE UP (ref 96–108)
CO2 SERPL-SCNC: 26 MMOL/L — SIGNIFICANT CHANGE UP (ref 22–31)
CREAT SERPL-MCNC: 0.8 MG/DL — SIGNIFICANT CHANGE UP (ref 0.5–1.3)
CULTURE RESULTS: NO GROWTH — SIGNIFICANT CHANGE UP
CULTURE RESULTS: SIGNIFICANT CHANGE UP
CULTURE RESULTS: SIGNIFICANT CHANGE UP
EGFR: 118 ML/MIN/1.73M2 — SIGNIFICANT CHANGE UP
GLUCOSE SERPL-MCNC: 126 MG/DL — HIGH (ref 70–99)
HCT VFR BLD CALC: 32.8 % — LOW (ref 39–50)
HGB BLD-MCNC: 11 G/DL — LOW (ref 13–17)
MCHC RBC-ENTMCNC: 29.3 PG — SIGNIFICANT CHANGE UP (ref 27–34)
MCHC RBC-ENTMCNC: 33.5 GM/DL — SIGNIFICANT CHANGE UP (ref 32–36)
MCV RBC AUTO: 87.5 FL — SIGNIFICANT CHANGE UP (ref 80–100)
MPO AB + PR3 PNL SER: SIGNIFICANT CHANGE UP
NRBC # BLD: 0 /100 WBCS — SIGNIFICANT CHANGE UP (ref 0–0)
P-ANCA SER-ACNC: NEGATIVE — SIGNIFICANT CHANGE UP
PLATELET # BLD AUTO: 301 K/UL — SIGNIFICANT CHANGE UP (ref 150–400)
POTASSIUM SERPL-MCNC: 3.3 MMOL/L — LOW (ref 3.5–5.3)
POTASSIUM SERPL-SCNC: 3.3 MMOL/L — LOW (ref 3.5–5.3)
PROT SERPL-MCNC: 5.2 G/DL — LOW (ref 6–8.3)
RBC # BLD: 3.75 M/UL — LOW (ref 4.2–5.8)
RBC # FLD: 12.6 % — SIGNIFICANT CHANGE UP (ref 10.3–14.5)
SODIUM SERPL-SCNC: 137 MMOL/L — SIGNIFICANT CHANGE UP (ref 135–145)
SPECIMEN SOURCE: SIGNIFICANT CHANGE UP
WBC # BLD: 8.79 K/UL — SIGNIFICANT CHANGE UP (ref 3.8–10.5)
WBC # FLD AUTO: 8.79 K/UL — SIGNIFICANT CHANGE UP (ref 3.8–10.5)

## 2024-03-06 PROCEDURE — 87086 URINE CULTURE/COLONY COUNT: CPT

## 2024-03-06 PROCEDURE — 87493 C DIFF AMPLIFIED PROBE: CPT

## 2024-03-06 PROCEDURE — 87328 CRYPTOSPORIDIUM AG IA: CPT

## 2024-03-06 PROCEDURE — 85730 THROMBOPLASTIN TIME PARTIAL: CPT

## 2024-03-06 PROCEDURE — 36415 COLL VENOUS BLD VENIPUNCTURE: CPT

## 2024-03-06 PROCEDURE — 85025 COMPLETE CBC W/AUTO DIFF WBC: CPT

## 2024-03-06 PROCEDURE — 85027 COMPLETE CBC AUTOMATED: CPT

## 2024-03-06 PROCEDURE — 86036 ANCA SCREEN EACH ANTIBODY: CPT

## 2024-03-06 PROCEDURE — 83993 ASSAY FOR CALPROTECTIN FECAL: CPT

## 2024-03-06 PROCEDURE — 93005 ELECTROCARDIOGRAM TRACING: CPT

## 2024-03-06 PROCEDURE — 88346 IMFLUOR 1ST 1ANTB STAIN PX: CPT

## 2024-03-06 PROCEDURE — 89055 LEUKOCYTE ASSESSMENT FECAL: CPT

## 2024-03-06 PROCEDURE — 80053 COMPREHEN METABOLIC PANEL: CPT

## 2024-03-06 PROCEDURE — 85610 PROTHROMBIN TIME: CPT

## 2024-03-06 PROCEDURE — 74177 CT ABD & PELVIS W/CONTRAST: CPT | Mod: MC

## 2024-03-06 PROCEDURE — 82397 CHEMILUMINESCENT ASSAY: CPT

## 2024-03-06 PROCEDURE — 84145 PROCALCITONIN (PCT): CPT

## 2024-03-06 PROCEDURE — 83605 ASSAY OF LACTIC ACID: CPT

## 2024-03-06 PROCEDURE — 99285 EMERGENCY DEPT VISIT HI MDM: CPT | Mod: 25

## 2024-03-06 PROCEDURE — 85652 RBC SED RATE AUTOMATED: CPT

## 2024-03-06 PROCEDURE — 83036 HEMOGLOBIN GLYCOSYLATED A1C: CPT

## 2024-03-06 PROCEDURE — 87507 IADNA-DNA/RNA PROBE TQ 12-25: CPT

## 2024-03-06 PROCEDURE — 87045 FECES CULTURE AEROBIC BACT: CPT

## 2024-03-06 PROCEDURE — 87046 STOOL CULTR AEROBIC BACT EA: CPT

## 2024-03-06 PROCEDURE — 99232 SBSQ HOSP IP/OBS MODERATE 35: CPT

## 2024-03-06 PROCEDURE — 81001 URINALYSIS AUTO W/SCOPE: CPT

## 2024-03-06 PROCEDURE — 87637 SARSCOV2&INF A&B&RSV AMP PRB: CPT

## 2024-03-06 PROCEDURE — 87177 OVA AND PARASITES SMEARS: CPT

## 2024-03-06 PROCEDURE — 86140 C-REACTIVE PROTEIN: CPT

## 2024-03-06 PROCEDURE — 96361 HYDRATE IV INFUSION ADD-ON: CPT

## 2024-03-06 PROCEDURE — 87040 BLOOD CULTURE FOR BACTERIA: CPT

## 2024-03-06 PROCEDURE — 71045 X-RAY EXAM CHEST 1 VIEW: CPT

## 2024-03-06 PROCEDURE — 96365 THER/PROPH/DIAG IV INF INIT: CPT

## 2024-03-06 PROCEDURE — 88350 IMFLUOR EA ADDL 1ANTB STN PX: CPT

## 2024-03-06 PROCEDURE — 83520 IMMUNOASSAY QUANT NOS NONAB: CPT

## 2024-03-06 PROCEDURE — 96375 TX/PRO/DX INJ NEW DRUG ADDON: CPT

## 2024-03-06 PROCEDURE — 81479 UNLISTED MOLECULAR PATHOLOGY: CPT

## 2024-03-06 RX ORDER — POTASSIUM CHLORIDE 20 MEQ
40 PACKET (EA) ORAL ONCE
Refills: 0 | Status: DISCONTINUED | OUTPATIENT
Start: 2024-03-06 | End: 2024-03-06

## 2024-03-06 RX ORDER — POTASSIUM CHLORIDE 20 MEQ
40 PACKET (EA) ORAL ONCE
Refills: 0 | Status: COMPLETED | OUTPATIENT
Start: 2024-03-06 | End: 2024-03-06

## 2024-03-06 RX ORDER — LOPERAMIDE HCL 2 MG
1 TABLET ORAL
Qty: 0 | Refills: 0 | DISCHARGE
Start: 2024-03-06

## 2024-03-06 RX ORDER — LOPERAMIDE HCL 2 MG
2 TABLET ORAL
Refills: 0 | Status: DISCONTINUED | OUTPATIENT
Start: 2024-03-06 | End: 2024-03-06

## 2024-03-06 RX ADMIN — CHOLESTYRAMINE 4 GRAM(S): 4 POWDER, FOR SUSPENSION ORAL at 09:21

## 2024-03-06 RX ADMIN — Medication 40 MILLIEQUIVALENT(S): at 12:56

## 2024-03-06 RX ADMIN — HEPARIN SODIUM 5000 UNIT(S): 5000 INJECTION INTRAVENOUS; SUBCUTANEOUS at 06:34

## 2024-03-06 NOTE — DISCHARGE NOTE NURSING/CASE MANAGEMENT/SOCIAL WORK - PATIENT PORTAL LINK FT
You can access the FollowMyHealth Patient Portal offered by [facility] by registering at the following website: [email or web address]. By joining [employer]’s FollowMyHealth portal, you will also be able to view your health information using other applications (apps) compatible with our system.

## 2024-03-06 NOTE — PROGRESS NOTE ADULT - NUTRITIONAL ASSESSMENT
This patient has been assessed with a concern for Malnutrition and has been determined to have a diagnosis/diagnoses of Mild protein-calorie malnutrition.    This patient is being managed with:   Diet Full Liquid-  Entered: Mar  5 2024  2:58PM  
This patient has been assessed with a concern for Malnutrition and has been determined to have a diagnosis/diagnoses of Mild protein-calorie malnutrition.    This patient is being managed with:   Diet Regular-  Fiber/Residue Restricted  Entered: Mar  6 2024  9:35AM  
This patient has been assessed with a concern for Malnutrition and has been determined to have a diagnosis/diagnoses of Mild protein-calorie malnutrition.    This patient is being managed with:   Diet Full Liquid-  Entered: Mar  5 2024  2:58PM

## 2024-03-06 NOTE — PATIENT CHOICE NOTE. - NSPTCHOICESTATE_GEN_ALL_CORE

## 2024-03-06 NOTE — PROGRESS NOTE ADULT - PROBLEM SELECTOR PROBLEM 1
"Chief Complaint   Patient presents with     [facility] F/U     [facility] Maintenance     Tetanus, Hep C, Lipid, Colon Cancer Screen, Mammo, and Pap       Initial /73 (BP Location: Right arm, Patient Position: Chair, Cuff Size: Adult Large)  Pulse 74  Temp 98  F (36.7  C) (Oral)  Ht 5' 7.32\" (1.71 m)  Wt 279 lb 12.8 oz (126.9 kg)  LMP 07/24/2006  SpO2 98%  Breastfeeding? No  BMI 43.4 kg/m2 Estimated body mass index is 43.4 kg/(m^2) as calculated from the following:    Height as of this encounter: 5' 7.32\" (1.71 m).    Weight as of this encounter: 279 lb 12.8 oz (126.9 kg).  Medication Reconciliation: rio Guido MA      "
Colitis
Colitis

## 2024-03-06 NOTE — CARE COORDINATION ASSESSMENT. - NSPASTMEDSURGHISTORY_GEN_ALL_CORE_FT
PAST MEDICAL & SURGICAL HISTORY:  Anxiety and depression      No significant past surgical history

## 2024-03-06 NOTE — CARE COORDINATION ASSESSMENT. - OTHER PERTINENT DISCHARGE PLANNING INFORMATION:
Pt is a 38 year old male who resides at home with his parents. Pt is alert and oriented x4. SW met with pt at bedside to address SBIRT consult. Pt is employed within [employer] as a . Pt is able to make needs know and is able to meet basic needs. His local pharmacy is [facility] in [place], PCP is Dr Parker Chamberlain on [place].  Pt is independent with ADLs and ambulation. No dc needs anticipated. Pt reports he doesn't have any ETOH use.  Pt is a 38 year old male who resides at home with his parents. Pt is alert and oriented x4. SW met with pt at bedside to address SBIRT consult. Pt is employed within [employer] as a . Pt is able to make needs know and is able to meet basic needs. His local pharmacy is [facility] in [place], PCP is Dr Parker Chamberlain in [place].  Pt is independent with ADLs and ambulation. No dc needs anticipated. Pt reports he doesn't have any ETOH use. Sw remains available.

## 2024-03-06 NOTE — CARE COORDINATION ASSESSMENT. - DOES THE PATIENT HAVE FINANCIAL RESOURCES TO MEET HEALTHCARE NEEDS SUCH AS PRESCRIPTIONS?
[address] [facility] [address] [place] [address]   Office [phone], Fax [phone]      History & Physical    Name: Nazia Duncan MRN: [id number]  SSN: [id number]    YOB: 1983  Age: 41 y.o. Sex: female      Subjective:     Reason for Admission:  Pregnancy and Contractions and PPROM    History of Present Illness: Ms. Quynh Anderson is a 41 y.o. [id number]  female with an estimated gestational age of 29w2d with Estimated Date of Delivery: 22. Patient complains of mild contractions for 5 hours and large gush of fluid at 9pm. Pregnancy has been complicated by advanced maternal age and diabetes - gestational and anemia on iron. Patient denies abdominal pain  , chest pain, fever, headache , nausea and vomiting, right upper quadrant pain  , shortness of breath and visual disturbances. OB History    Para Term  AB Living   6 5 4 1   5   SAB IAB Ectopic Molar Multiple Live Births             5      # Outcome Date GA Lbr Osvaldo/2nd Weight Sex Delivery Anes PTL Lv   6 Current            5 Term  38w0d  6 lb 8 oz (2.948 kg) M  None N CHERELLE   4 Term  38w0d   F Vag-Spont None N CHERELLE   3 Term  38w0d  6 lb (2.722 kg) F Vag-Spont None N CHERELLE   2 Term  38w0d  8 lb (3.629 kg) M Vag-Spont None N CHERELLE   1   36w0d  6 lb 8.1 oz (2.95 kg) M Vag-Spont None Y CHERELLE     Past Medical History:   Diagnosis Date    Gestational diabetes      No past surgical history on file. Social History     Occupational History    Not on file   Tobacco Use    Smoking status: Never Smoker    Smokeless tobacco: Never Used   Substance and Sexual Activity    Alcohol use: Yes    Drug use: Not on file    Sexual activity: Not on file      Family History   Problem Relation Age of Onset    Hypertension Mother        No Known Allergies  Prior to Admission medications    Medication Sig Start Date End Date Taking?  Authorizing Provider   ferrous sulfate (IRON) 325 mg (65 mg iron) EC tablet Take 1 Tablet by mouth every other day. 3/24/22  Yes Torsten Chavez MD   PNV Comb #2-Iron-FA-Omega 3 29-1-400 mg cmpk Take  by mouth. Yes Provider, Historical   glucose blood VI test strips (ReliOn Prime Test Strips) strip Use as directed 22   Gilberto Elizabeth MD   lancets (ReliOn Ultra Thin Plus Lancets) misc Use as directed. 3/31/22   Torsten Chavez MD   Blood-Glucose Meter (ReliOn Prime Meter) misc Please measure your blood sugar at least four times daily (fasting and 2 hours after eating). Goal <95 fasting and <120 two hours after eating. Use as directed. 3/31/22   Torsten Chavez MD   aspirin delayed-release 81 mg tablet Take 1 Tablet by mouth daily. 22   Lam Morin MD   acetaminophen (TYLENOL PO) Take  by mouth. Patient not taking: Reported on 2022    Provider, Historical        Review of Systems:  Pertinent items are noted in the History of Present Illness. Objective:     Vitals:    Vitals:    22 0100 22 0110   BP:  (!) 140/94   Pulse:  (!) 102   Resp:  16   Temp:  98.8 °F (37.1 °C)   SpO2:  100%   Weight: 187 lb (84.8 kg)    Height: 5' (1.524 m)       Temp (24hrs), Av.8 °F (37.1 °C), Min:98.8 °F (37.1 °C), Max:98.8 °F (37.1 °C)    BP  Min: 140/94  Max: 140/94     Physical Exam:  Patient without distress. Heart: Regular rate and rhythm or S1S2 present  Lung: clear to auscultation throughout lung fields, no wheezes, no rales, no rhonchi and normal respiratory effort  Back: costovertebral angle tenderness absent  Abdomen: soft, nontender  Fundus: soft and non tender  Cervical Exam: 4.5/90/0  Lower Extremities:  - Edema No     Membranes:   Premature Rupture of Membranes;  Amniotic Fluid: clear fluid  Uterine Activity:  Date/time of onset: 9pm  Frequency: Every 4 minutes   Duration: 45 seconds  Fetal Heart Rate:  Reactive  Baseline: 150 per minute  Variability: moderate       Lab/Data Review:  Recent Results (from the past 24 hour(s))   CBC W/O DIFF    Collection Time: 22 12:58 AM   Result Value Ref Range    WBC 11.7 (H) 3.6 - 11.0 K/uL    RBC 3.68 (L) 3.80 - 5.20 M/uL    HGB 10.1 (L) 11.5 - 16.0 g/dL    HCT 31.5 (L) 35.0 - 47.0 %    MCV 85.6 80.0 - 99.0 FL    MCH 27.4 26.0 - 34.0 PG    MCHC 32.1 30.0 - 36.5 g/dL    RDW 13.7 11.5 - 14.5 %    PLATELET 275 814 - 563 K/uL    MPV 12.2 8.9 - 12.9 FL    NRBC 0.0 0  WBC    ABSOLUTE NRBC 0.00 0.00 - 0.01 K/uL       Assessment and Plan:     Ms. Pierre Encarnacion is a 41 y.o. [id number]  female with an estimated gestational age of 29w2d who is admitted for active labor in the setting of PPROM. Active labor/ SIUP: SVE: 4.5/90/0, PPROM @9pm.   PNL: B+, Ab neg, Hgb fractionation normal, HIV/G/C/RPR/Hep B/Hep C neg, Rub/VZV immune, Ucx neg, Pap NILM, Failed 1hr and 3hr GTT. US () @ 32w3d: EFW: 45th%. Fetal anatomy appears normal.   - Admit for labor, anticipate . Showing rapid progression from 4.5cm to 6.5cm in <1hr.   - Desires epidural, bolus fluids and consult anesthesia  - GBS unknown, Penicillin 5mil now  - CBC / CMP/ Urine Pr/Cr  - Continuous toco  - NPO     Elevated BP: No history of HTN, /94 in L&D.   - collect PreE labs  - continue to monitor BP      Gestational Diabetes: Failed 3 hr GTT. A1C 5.6. No medications. - monitor glucose     Anemia: Hgb 10.4 on 3/23/22  - CBC  -Continue oral iron     Late to care:  Pt presented at 20 weeks. - Continue with PNV     Advance maternal age:   - Continue ASA 81 mg daily     Obesity in pregnancy:  - BMI 36.5     Family hx of thyroid disease: TSH wnl.       Patient discussed with Dr. Quan Carrasco Hospitalist     Howard Vicente MD  Family Medicine Resident Yes

## 2024-03-06 NOTE — PROGRESS NOTE ADULT - PROBLEM SELECTOR PLAN 1
2/2 to Norovirus and E. coli  D/C abx  Supportive care  Clinically improving   Imodium prn  Diet as tolerated  D/C home
? etiology  GI eval with dr. vanegas  empiric ABX

## 2024-03-06 NOTE — PROGRESS NOTE ADULT - SUBJECTIVE AND OBJECTIVE BOX
INTERVAL HPI/OVERNIGHT EVENTS:  some diarrhea is still present  tolerating clears well    Allergies    No Known Allergies    Intolerances    General:  No wt loss, fevers, chills, night sweats, fatigue,   Eyes:  Good vision, no reported pain  ENT:  No sore throat, pain, runny nose, dysphagia  CV:  No pain, palpitations, hypo/hypertension  Resp:  No dyspnea, cough, tachypnea, wheezing  GI:  No pain, No nausea, No vomiting, No diarrhea, No constipation, No weight loss, No fever, No pruritis, No rectal bleeding, No tarry stools, No dysphagia,  :  No pain, bleeding, incontinence, nocturia  Muscle:  No pain, weakness  Neuro:  No weakness, tingling, memory problems  Psych:  No fatigue, insomnia, mood problems, depression  Endocrine:  No polyuria, polydipsia, cold/heat intolerance  Heme:  No petechiae, ecchymosis, easy bruisability  Skin:  No rash, tattoos, scars, edema      PHYSICAL EXAM:   Vital Signs:  Vital Signs Last 24 Hrs  T(C): 36.9 (06 Mar 2024 08:19), Max: 37.7 (05 Mar 2024 14:39)  T(F): 98.4 (06 Mar 2024 08:19), Max: 99.8 (05 Mar 2024 14:39)  HR: 91 (06 Mar 2024 08:19) (88 - 93)  BP: 113/54 (06 Mar 2024 08:19) (113/54 - 116/73)  BP(mean): --  RR: 16 (06 Mar 2024 08:19) (16 - 18)  SpO2: 95% (06 Mar 2024 08:19) (95% - 96%)    Parameters below as of 06 Mar 2024 08:19  Patient On (Oxygen Delivery Method): room air      Daily     Daily I&O's Summary      GENERAL:  Appears stated age, well-groomed, well-nourished, no distress  HEENT:  NC/AT,  conjunctivae clear and pink, no thyromegaly, nodules, adenopathy, no JVD, sclera -anicteric  CHEST:  Full & symmetric excursion, no increased effort, breath sounds clear  HEART:  Regular rhythm, S1, S2, no murmur/rub/S3/S4, no abdominal bruit, no edema  ABDOMEN:  Soft, non-tender, non-distended, normoactive bowel sounds,  no masses ,no hepato-splenomegaly, no signs of chronic liver disease  EXTEREMITIES:  no cyanosis,clubbing or edema  SKIN:  No rash/erythema/ecchymoses/petechiae/wounds/abscess/warm/dry  NEURO:  Alert, oriented, no asterixis, no tremor, no encephalopathy      LABS:                        11.0   8.79  )-----------( 301      ( 06 Mar 2024 05:30 )             32.8     03-06    137  |  104  |  2<L>  ----------------------------<  126<H>  3.3<L>   |  26  |  0.80    Ca    8.2<L>      06 Mar 2024 05:30    TPro  5.2<L>  /  Alb  1.7<L>  /  TBili  0.6  /  DBili  x   /  AST  10  /  ALT  12  /  AlkPhos  59  03-06    PT/INR - ( 04 Mar 2024 15:36 )   PT: 14.1 sec;   INR: 1.27 ratio         PTT - ( 04 Mar 2024 15:36 )  PTT:29.4 sec  Urinalysis Basic - ( 06 Mar 2024 05:30 )    Color: x / Appearance: x / SG: x / pH: x  Gluc: 126 mg/dL / Ketone: x  / Bili: x / Urobili: x   Blood: x / Protein: x / Nitrite: x   Leuk Esterase: x / RBC: x / WBC x   Sq Epi: x / Non Sq Epi: x / Bacteria: x      amylase   lipase  RADIOLOGY & ADDITIONAL TESTS:  
[facility]  INFECTIOUS DISEASES   [address] [place] [address]  Tel: [phone]     Fax: [phone]  ========================================================  MD Singh Guerra Kaushal, MD Cho, Michelle, MD Sunjit, Jaspal, MD  ========================================================    [id number]  MARGRET GRANDA     Follow up: Gastroenteritis     Diarrhea is improving. No abdominal pain. No vomiting, had 2 episodes of diarrhea since last night.   Tolerating liquid diet.     PAST MEDICAL & SURGICAL HISTORY:  Anxiety and depression  No significant past surgical history    Social Hx: No current smoking, EtOH or drugs     FAMILY HISTORY:  Noncontributory     Allergies  No Known Allergies    MEDICATIONS  (STANDING):  cholestyramine Powder (Sugar-Free) 4 Gram(s) Oral daily  heparin   Injectable 5000 Unit(s) SubCutaneous every 8 hours  lactobacillus acidophilus 1 Tablet(s) Oral daily  multivitamin 1 Tablet(s) Oral daily  potassium chloride    Tablet ER 10 milliEquivalent(s) Oral daily  sertraline 100 milliGRAM(s) Oral daily  sodium chloride 0.9% with potassium chloride 20 mEq/L 1000 milliLiter(s) (75 mL/Hr) IV Continuous <Continuous>     REVIEW OF SYSTEMS:  CONSTITUTIONAL:  No Fever or chills  HEENT:  No diplopia or blurred vision.  No sore throat or runny nose.  CARDIOVASCULAR:  No chest pain   RESPIRATORY:  No cough, shortness of breath, PND or orthopnea.  GASTROINTESTINAL:  no nausea, + diarrhea, no abd pain  GENITOURINARY:  No dysuria, frequency or urgency. No Blood in urine  MUSCULOSKELETAL:  no joint aches, no muscle pain  SKIN:  No change in skin, hair or nails.    Physical Exam:  Vital Signs Last 24 Hrs  T(C): 36.9 (06 Mar 2024 08:19), Max: 37.7 (05 Mar 2024 14:39)  T(F): 98.4 (06 Mar 2024 08:19), Max: 99.8 (05 Mar 2024 14:39)  HR: 91 (06 Mar 2024 08:19) (88 - 93)  BP: 113/54 (06 Mar 2024 08:19) (113/54 - 116/73)  BP(mean): --  RR: 16 (06 Mar 2024 08:19) (16 - 18)  SpO2: 95% (06 Mar 2024 08:19) (95% - 96%)  Parameters below as of 06 Mar 2024 08:19  Patient On (Oxygen Delivery Method): room air  GEN: NAD  HEENT: normocephalic and atraumatic. EOMI. PERRL.    NECK: Supple.  No lymphadenopathy   LUNGS: Clear to auscultation.  HEART: Regular rate and rhythm   ABDOMEN: Soft, nontender, and nondistended.  Positive bowel sounds.    : No CVA tenderness  EXTREMITIES: Without edema.  NEUROLOGIC: grossly intact.  PSYCHIATRIC: Appropriate affect .  SKIN: No rash     Labs:                        11.0   8.79  )-----------( 301      ( 06 Mar 2024 05:30 )             32.8     03-06    137  |  104  |  2<L>  ----------------------------<  126<H>  3.3<L>   |  26  |  0.80    Ca    8.2<L>      06 Mar 2024 05:30    TPro  5.2<L>  /  Alb  1.7<L>  /  TBili  0.6  /  DBili  x   /  AST  10  /  ALT  12  /  AlkPhos  59  03-06    Culture - Blood (collected 03-04-24 @ 15:36)  Source: .Blood Blood-Peripheral  Preliminary Report (03-05-24 @ 22:02):    No growth at 24 hours    Culture - Blood (collected 03-04-24 @ 15:36)  Source: .Blood Blood-Peripheral  Preliminary Report (03-05-24 @ 22:02):    No growth at 24 hours    WBC Count: 8.79 K/uL (03-06-24 @ 05:30)  WBC Count: 11.51 K/uL (03-05-24 @ 06:00)  WBC Count: 11.62 K/uL (03-04-24 @ 15:36)    Creatinine: 0.80 mg/dL (03-06-24 @ 05:30)  Creatinine: 0.84 mg/dL (03-05-24 @ 16:17)  Creatinine: 1.03 mg/dL (03-04-24 @ 15:36)    C-Reactive Protein, Serum: 205 mg/L (03-05-24 @ 06:00)    Sedimentation Rate, Erythrocyte: 44 mm/Hr (03-05-24 @ 06:00)    Procalcitonin, Serum: 0.36 ng/mL (03-05-24 @ 06:00)     SARS-CoV-2 Result: NotDetec (03-04-24 @ 15:36)    All imaging and other data have been reviewed.  < from: CT Abdomen and Pelvis w/ IV Cont (03.04.24 @ 15:55) >  ACC: [id number] EXAM:  CT ABDOMEN AND PELVIS IC   ORDERED BY: ANDREW MALDONADO   PROCEDURE DATE:  03/04/2024    INTERPRETATION:  CLINICAL INFORMATION: Left abdominal pain, fever, and   diarrhea  COMPARISON: None.  CONTRAST/COMPLICATIONS:  IV Contrast: Omnipaque 350  92 cc administered   8 cc discarded  Oral Contrast: NONE  Complications: None reported at time of study completion  PROCEDURE:  CT of the Abdomen and Pelvis was performed.  Sagittal and coronal reformats were performed.  FINDINGS:  LOWER CHEST: Clear lung bases.  LIVER: Subcentimeter left hepatic hypodensity, too small to further   characterize.  BILE DUCTS: Normal caliber.  GALLBLADDER: Within normal limits.  SPLEEN: Within normal limits.  PANCREAS: Within normal limits.  ADRENALS: Within normal limits.  KIDNEYS/URETERS: Subcentimeter renal hypodensities, too small to further   characterize. No hydronephrosis.  BLADDER: Within normal limits.  REPRODUCTIVE ORGANS: Prostate within normal limits.  BOWEL: No bowel obstruction. Appendix is within normal limits. Diffuse   colonic wall thickening and inflammation.  PERITONEUM: Trace pelvic fluid.  VESSELS: Within normal limits.  RETROPERITONEUM/LYMPH NODES: No lymphadenopathy. Prominent mesenteric and   retroperitoneal lymph nodes.  ABDOMINAL WALL: Tiny fat-containing umbilical hernia.  BONES: Left L5 spondylolysis.  IMPRESSION:  Pancolitis.    Assessment and Plan:   37yo man with PMH of anxiety and depression was admitted with severe diarrhea and abdominal pain. No vomiting but was nauseous, currently tolerating liquid diet.   No respiratory symptoms. No fever or chills. Since in ED had 2-3 diarrhea less than yesterday but abdominal pain is better.   Always eat out, recently had some leftover food. No raw meat lately but usually eats sushi frequently. No recent travel, no sick contact.   Lives with his dad who doesn't have any diarrhea. No pets.     # Gastroenteritis and colitis   - Blood cultures NGTD   - GI PCR with Norovirus and Enterotoxigenic Ecoli   - Can stop antibiotics, self-limited   - GI consult noted   - Tmax normal and WBC=11-->8 normalized   - If tolerates regular food can be discharged from ID stand point    Will sign off please call with any question.   D/W with Dr. William.     Pablo Astorga MD  Division of Infectious Diseases   Please call ID service at [phone] with any question.    50 minutes spent on total encounter assessing patient, examination, chart review, counseling and coordinating care by the attending physician/nurse/care manager.  
Date of Service: 03-06-24 @ 11:14    Patient is a 38y old  Male who presents with a chief complaint of colitis (06 Mar 2024 08:40)      INTERVAL HPI/OVERNIGHT EVENTS: Patient seen and examined. NAD. Feeling better. Less diarrhea.    Vital Signs Last 24 Hrs  T(C): 36.9 (06 Mar 2024 08:19), Max: 37.7 (05 Mar 2024 14:39)  T(F): 98.4 (06 Mar 2024 08:19), Max: 99.8 (05 Mar 2024 14:39)  HR: 91 (06 Mar 2024 08:19) (88 - 93)  BP: 113/54 (06 Mar 2024 08:19) (113/54 - 116/73)  BP(mean): --  RR: 16 (06 Mar 2024 08:19) (16 - 18)  SpO2: 95% (06 Mar 2024 08:19) (95% - 96%)    Parameters below as of 06 Mar 2024 08:19  Patient On (Oxygen Delivery Method): room air        03-06    137  |  104  |  2<L>  ----------------------------<  126<H>  3.3<L>   |  26  |  0.80    Ca    8.2<L>      06 Mar 2024 05:30    TPro  5.2<L>  /  Alb  1.7<L>  /  TBili  0.6  /  DBili  x   /  AST  10  /  ALT  12  /  AlkPhos  59  03-06                          11.0   8.79  )-----------( 301      ( 06 Mar 2024 05:30 )             32.8     PT/INR - ( 04 Mar 2024 15:36 )   PT: 14.1 sec;   INR: 1.27 ratio         PTT - ( 04 Mar 2024 15:36 )  PTT:29.4 sec  CAPILLARY BLOOD GLUCOSE        Urinalysis Basic - ( 06 Mar 2024 05:30 )    Color: x / Appearance: x / SG: x / pH: x  Gluc: 126 mg/dL / Ketone: x  / Bili: x / Urobili: x   Blood: x / Protein: x / Nitrite: x   Leuk Esterase: x / RBC: x / WBC x   Sq Epi: x / Non Sq Epi: x / Bacteria: x    GI PCR Panel Stool (03.04.24 @ 21:10)   GI PCR Panel: Detected: GI Panel PCR evaluates for:   Campylobacter, Plesiomonas shigelloides, Salmonella, Vibrio, Yersinia   enterocolitica, Enteroaggregative Escherichia (EAEC), Enteropathogenic E.   coli (EPEC), Enterotoxigenic E. coli (ETEC), Shiga-like toxin producing   E.coli (STEC), E. coli O157, Shigella/Enteroinvasive E. coli (EIEC),   Adenovirus, Astrovirus, Norovirus, Rotavirus, Sapovirus, Cryptosporidium,   Cyclospora cayetanensis, Entamoeba histolytica, Giardia lamblia.   For culture and susceptibility reports refer to "reflex stool culture".  Enterotoxigenic E.coli (ETEC) lt/st: Detected  Norovirus GI/GII: DetectedC Diff by PCR Result: NotDetec: The results of this test should be interpreted with consideration of   clinical context.   Not Detected result indicates the absence of C. difficile or that the   number of organisms is below the assay limit of detection.   Detected result indicates the presence of C. difficile nucleic acid.   Indeterminate result may indicate the presence of amplification   inhibitors in specimen; presence or absence of organisms cannot be   determined. Consider collecting new specimen if further testing is still   needed.   This qualitative PCR assay detects the toxin B gene; it is FDA-approved,   and its performance was established by [facility] [employer] [facility],   [place], [place]. (03.04.24 @ 21:10)           acetaminophen   Oral Liquid .. 650 milliGRAM(s) Oral every 6 hours PRN  cholestyramine Powder (Sugar-Free) 4 Gram(s) Oral daily  heparin   Injectable 5000 Unit(s) SubCutaneous every 8 hours  lactobacillus acidophilus 1 Tablet(s) Oral daily  loperamide 2 milliGRAM(s) Oral four times a day PRN  multivitamin 1 Tablet(s) Oral daily  ondansetron Injectable 4 milliGRAM(s) IV Push four times a day PRN  potassium chloride    Tablet ER 10 milliEquivalent(s) Oral daily  potassium chloride   Powder 40 milliEquivalent(s) Oral once  sertraline 100 milliGRAM(s) Oral daily              REVIEW OF SYSTEMS:  CONSTITUTIONAL: No fever, no weight loss, or no fatigue  NECK: No pain, no stiffness  RESPIRATORY: No cough, no wheezing, no chills, no hemoptysis, No shortness of breath  CARDIOVASCULAR: No chest pain, no palpitations, no dizziness, no leg swelling  GASTROINTESTINAL: No abdominal pain. No nausea, no vomiting, no hematemesis; No diarrhea, no constipation. No melena, no hematochezia.  GENITOURINARY: No dysuria, no frequency, no hematuria, no incontinence  NEUROLOGICAL: No headaches, no loss of strength, no numbness, no tremors  SKIN: No itching, no burning  MUSCULOSKELETAL: No joint pain, no swelling; No muscle, no back, no extremity pain  PSYCHIATRIC: No depression, no mood swings,   HEME/LYMPH: No easy bruising, no bleeding gums  ALLERY AND IMMUNOLOGIC: No hives       Consultant(s) Notes Reviewed:  [X] YES  [ ] NO    PHYSICAL EXAM:  GENERAL: NAD  HEAD:  Atraumatic, Normocephalic  EYES: EOMI, PERRLA, conjunctiva and sclera clear  ENMT: No tonsillar erythema, exudates, or enlargement; Moist mucous membranes  NECK: Supple, No JVD  NERVOUS SYSTEM:  Awake & alert  CHEST/LUNG: Clear to auscultation bilaterally; No rales, rhonchi, wheezing,  HEART: Regular rate and rhythm  ABDOMEN: Soft, Nontender, Nondistended; Bowel sounds present  EXTREMITIES:  No clubbing, cyanosis, or edema  LYMPH: No lymphadenopathy noted  SKIN: No rashes      Advanced care planning discussed with patient/family [X] YES   [ ] NO    Advanced care planning discussed with patient/family. Patient's health status was discussed. All appropriate changes have been made regarding patient's end-of-life care. Advanced care planning forms reviewed/discussed/completed.  20 minutes spent.   
INTERVAL HPI/OVERNIGHT EVENTS:  HPI:  No new overnight event.  No N/V. Does have diarrhea still. Tolerating diet.     MEDICATIONS  (STANDING):  cholestyramine Powder (Sugar-Free) 4 Gram(s) Oral daily  ciprofloxacin   IVPB 400 milliGRAM(s) IV Intermittent every 12 hours  heparin   Injectable 5000 Unit(s) SubCutaneous every 12 hours  lactobacillus acidophilus 1 Tablet(s) Oral daily  metroNIDAZOLE  IVPB 500 milliGRAM(s) IV Intermittent every 8 hours  multivitamin 1 Tablet(s) Oral daily  potassium chloride    Tablet ER 10 milliEquivalent(s) Oral daily  sertraline 100 milliGRAM(s) Oral daily  sodium chloride 0.9%. 1000 milliLiter(s) (100 mL/Hr) IV Continuous <Continuous>    MEDICATIONS  (PRN):  acetaminophen   Oral Liquid .. 650 milliGRAM(s) Oral every 6 hours PRN Temp greater or equal to 38C (100.4F), Mild Pain (1 - 3)  ondansetron Injectable 4 milliGRAM(s) IV Push four times a day PRN Nausea and/or Vomiting      Allergies    No Known Allergies    Intolerances    General:  No wt loss, fevers, chills, night sweats, fatigue,   Eyes:  Good vision, no reported pain  ENT:  No sore throat, pain, runny nose, dysphagia  CV:  No pain, palpitations, hypo/hypertension  Resp:  No dyspnea, cough, tachypnea, wheezing  GI:  see HPI  :  No pain, bleeding, incontinence, nocturia  Muscle:  No pain, weakness  Neuro:  No weakness, tingling, memory problems  Psych:  No fatigue, insomnia, mood problems, depression  Endocrine:  No polyuria, polydipsia, cold/heat intolerance  Heme:  No petechiae, ecchymosis, easy bruisability  Skin:  No rash, tattoos, scars, edema      PHYSICAL EXAM:   Vital Signs:  Vital Signs Last 24 Hrs  T(C): 37.8 (05 Mar 2024 08:13), Max: 38.7 (04 Mar 2024 22:51)  T(F): 100 (05 Mar 2024 08:13), Max: 101.7 (04 Mar 2024 22:51)  HR: 94 (05 Mar 2024 08:13) (88 - 116)  BP: 117/78 (05 Mar 2024 08:13) (107/70 - 140/83)  BP(mean): --  RR: 17 (05 Mar 2024 08:13) (17 - 20)  SpO2: 96% (05 Mar 2024 08:13) (96% - 99%)    Parameters below as of 05 Mar 2024 08:13  Patient On (Oxygen Delivery Method): room air      Daily Height in cm: 190.5 (04 Mar 2024 14:32)    Daily I&O's Summary      GENERAL:  Appears stated age, well-groomed, well-nourished, no distress  HEENT:  NC/AT,  conjunctivae clear and pink, no thyromegaly, nodules, adenopathy, no JVD, sclera -anicteric  CHEST:  Full & symmetric excursion, no increased effort, breath sounds clear  HEART:  Regular rhythm, S1, S2, no murmur/rub/S3/S4, no abdominal bruit, no edema  ABDOMEN:  Soft, +tender, non-distended, normoactive bowel sounds,  no masses ,no hepato-splenomegaly, no signs of chronic liver disease  EXTEREMITIES:  no cyanosis,clubbing or edema  SKIN:  No rash/erythema/ecchymoses/petechiae/wounds/abscess/warm/dry  NEURO:  Alert, oriented, no asterixis, no tremor, no encephalopathy      LABS:                        12.1   11.51 )-----------( 275      ( 05 Mar 2024 06:00 )             36.8     03-04    135  |  98  |  5<L>  ----------------------------<  144<H>  3.5   |  30  |  1.03    Ca    8.6      04 Mar 2024 15:36    TPro  6.8  /  Alb  2.4<L>  /  TBili  0.8  /  DBili  x   /  AST  10  /  ALT  19  /  AlkPhos  82  03-04    PT/INR - ( 04 Mar 2024 15:36 )   PT: 14.1 sec;   INR: 1.27 ratio         PTT - ( 04 Mar 2024 15:36 )  PTT:29.4 sec  Urinalysis Basic - ( 04 Mar 2024 21:10 )    Color: Yellow / Appearance: Clear / S.074 / pH: x  Gluc: x / Ketone: Negative mg/dL  / Bili: Negative / Urobili: 0.2 mg/dL   Blood: x / Protein: Trace mg/dL / Nitrite: Negative   Leuk Esterase: Negative / RBC: 1 /HPF / WBC 3 /HPF   Sq Epi: x / Non Sq Epi: x / Bacteria: Occasional /HPF      amylase   lipase  RADIOLOGY & ADDITIONAL TESTS:  
Date of Service 03-05-24 @ 17:13    Patient is a 38y old  Male who presents with a chief complaint of HPI:  Patient complaining of 2 weeks of left lower quadrant abdominal pain associated with watery diarrhea and mild nausea.  Patient denies fever vomiting chest pain short breath cough dysuria hematuria frequency.  Patient denies sick contacts recent hospitalizations or antibiotic usage.  In ER patient was found to have pan colitis.  patient is being admitted for further work up and treatment. (04 Mar 2024 18:06)       (05 Mar 2024 15:06)      INTERVAL /OVERNIGHT EVENTS: diarhea improved    MEDICATIONS  (STANDING):  cholestyramine Powder (Sugar-Free) 4 Gram(s) Oral daily  ciprofloxacin   IVPB 400 milliGRAM(s) IV Intermittent every 12 hours  heparin   Injectable 5000 Unit(s) SubCutaneous every 8 hours  lactobacillus acidophilus 1 Tablet(s) Oral daily  metroNIDAZOLE  IVPB 500 milliGRAM(s) IV Intermittent every 8 hours  multivitamin 1 Tablet(s) Oral daily  potassium chloride    Tablet ER 10 milliEquivalent(s) Oral daily  potassium chloride    Tablet ER 10 milliEquivalent(s) Oral three times a day  potassium chloride  10 mEq/100 mL IVPB 10 milliEquivalent(s) IV Intermittent every 1 hour  sertraline 100 milliGRAM(s) Oral daily  sodium chloride 0.9% with potassium chloride 20 mEq/L 1000 milliLiter(s) (75 mL/Hr) IV Continuous <Continuous>    MEDICATIONS  (PRN):  acetaminophen   Oral Liquid .. 650 milliGRAM(s) Oral every 6 hours PRN Temp greater or equal to 38C (100.4F), Mild Pain (1 - 3)  ondansetron Injectable 4 milliGRAM(s) IV Push four times a day PRN Nausea and/or Vomiting      Allergies    No Known Allergies    Intolerances        REVIEW OF SYSTEMS:  CONSTITUTIONAL: No fever, weight loss, or fatigue  EYES: No eye pain, visual disturbances, or discharge  ENMT:  No difficulty hearing, tinnitus, vertigo; No sinus or throat pain  NECK: No pain or stiffness  RESPIRATORY: No cough, wheezing, chills or hemoptysis; No shortness of breath  CARDIOVASCULAR: No chest pain, palpitations, dizziness, or leg swelling  GASTROINTESTINAL: No abdominal or epigastric pain. + nausea, vomiting, or hematemesis; + diarrhea   GENITOURINARY: No dysuria, frequency, hematuria, or incontinence  NEUROLOGICAL: No headaches, memory loss, loss of strength, numbness, or tremors  SKIN: No itching, burning, rashes, or lesions   LYMPH NODES: No enlarged glands  ENDOCRINE: No heat or cold intolerance; No hair loss; No polydipsia or polyuria  MUSCULOSKELETAL: No joint pain or swelling; No muscle, back, or extremity pain  PSYCHIATRIC: No depression, anxiety, mood swings, or difficulty sleeping  HEME/LYMPH: No easy bruising, or bleeding gums  ALLERGY AND IMMUNOLOGIC: No hives or eczema    Vital Signs Last 24 Hrs  T(C): 37.1 (05 Mar 2024 16:02), Max: 38.7 (04 Mar 2024 22:51)  T(F): 98.7 (05 Mar 2024 16:02), Max: 101.7 (04 Mar 2024 22:51)  HR: 93 (05 Mar 2024 16:02) (88 - 103)  BP: 114/69 (05 Mar 2024 16:02) (107/70 - 123/80)  BP(mean): --  RR: 18 (05 Mar 2024 16:02) (17 - 18)  SpO2: 96% (05 Mar 2024 16:02) (96% - 99%)    Parameters below as of 05 Mar 2024 16:02  Patient On (Oxygen Delivery Method): room air        PHYSICAL EXAM:  GENERAL: NAD, well-groomed, well-developed  HEAD:  Atraumatic, Normocephalic  EYES: EOMI, PERRLA, conjunctiva and sclera clear  ENMT: No tonsillar erythema, exudates, or enlargement; Moist mucous membranes, Good dentition, No lesions  NECK: Supple, No JVD, Normal thyroid  NERVOUS SYSTEM:  Alert & Oriented X3, Good concentration; Motor Strength 5/5 B/L upper and lower extremities; DTRs 2+ intact and symmetric  CHEST/LUNG: Clear to auscultation bilaterally; No rales, rhonchi, wheezing, or rubs  HEART: Regular rate and rhythm; No murmurs, rubs, or gallops  ABDOMEN: Soft, Nontender, Nondistended; Bowel sounds present  EXTREMITIES:  2+ Peripheral Pulses, No clubbing, cyanosis, or edema  LYMPH: No lymphadenopathy noted  SKIN: No rashes or lesions    LABS:                        12.1   11.51 )-----------( 275      ( 05 Mar 2024 06:00 )             36.8     05 Mar 2024 16:17    138    |  103    |  3      ----------------------------<  178    3.1     |  26     |  0.84     Ca    8.0        05 Mar 2024 16:17    TPro  5.6    /  Alb  1.9    /  TBili  0.6    /  DBili  x      /  AST  10     /  ALT  20     /  AlkPhos  62     05 Mar 2024 16:17    PT/INR - ( 04 Mar 2024 15:36 )   PT: 14.1 sec;   INR: 1.27 ratio         PTT - ( 04 Mar 2024 15:36 )  PTT:29.4 sec  Urinalysis Basic - ( 05 Mar 2024 16:17 )    Color: x / Appearance: x / SG: x / pH: x  Gluc: 178 mg/dL / Ketone: x  / Bili: x / Urobili: x   Blood: x / Protein: x / Nitrite: x   Leuk Esterase: x / RBC: x / WBC x   Sq Epi: x / Non Sq Epi: x / Bacteria: x      CAPILLARY BLOOD GLUCOSE          RADIOLOGY & ADDITIONAL TESTS:    Notes Reviewed:  [x ] YES  [ ] NO    Care Discussed with Consultants/Other Providers [x ] YES  [ ] NO

## 2024-03-06 NOTE — CARE COORDINATION ASSESSMENT. - NSCAREPROVIDERS_GEN_ALL_CORE_FT
CARE PROVIDERS:  Accepting Physician: Bebeto Fierro  Administration: Kris Sanderson  Administration: Ebenezer Pascal  Administration: Fermin Larry  Admitting: Bebeto Fierro  Attending: Bebeto Fierro  Case Management: Stacia Mccormack  Consultant: Amirah Wang  Consultant: Dioni Downey  Consultant: Pablo Astorga  Covering Team: Nik Macdonald ED Attending: Wayne Stanley ED Nurse: George Velásquez  Infection Control: Jessy Calzada  Nurse: Asuncion Cabral  Nurse: Alex Daniel  Nurse: Zuleika Marshall  Nurse: George Velásquez  Ordered: ServiceAccount, [facility]MLM  Outpatient Provider: Case Wade  Override: Zuleika Marshall  PCA/Nursing Assistant: Mile Chow  Primary Team: Syed William  Primary Team: Bebeto Fierro  Registered Dietitian: Lorin Mercer  Respiratory Therapy: Adriana Santana  : Anastacia Donahue   CARE PROVIDERS:  Accepting Physician: Bebeto Fierro  Administration: Ebenezer Pascal  Administration: Fermin Larry  Administration: Kris Sanderson  Admitting: Bebeto Fierro  Attending: Bebeto Fierro  Case Management: Stacia Mccormack  Consultant: Amirah Wang  Consultant: Dioni Downey  Consultant: Pablo Astorga  Covering Team: Nik Macdonald ED Attending: Wayne Stanley ED Nurse: George Velásquez  Infection Control: Jessy Calzada  Nurse: Asuncion Cabral  Nurse: Alex Daniel  Nurse: Zuleika Marshall  Nurse: George Velásquez  Ordered: ServiceAccount, [facility]  Outpatient Provider: Case Wade  Override: Zuleika Marshall  PCA/Nursing Assistant: Mile Chow  Primary Team: Syed William  Primary Team: Bebeto Fierro  Registered Dietitian: Lorin Mercer  : Anastacia Donahue

## 2024-03-06 NOTE — PROGRESS NOTE ADULT - PROBLEM SELECTOR PLAN 2
add questran  GI dereck with Dr. cabrera  check stool studies
2/2 to Norovirus and E. coli  D/C abx  Supportive care  Clinically improving   Imodium prn  Diet as tolerated  D/C home

## 2024-03-06 NOTE — DISCHARGE NOTE NURSING/CASE MANAGEMENT/SOCIAL WORK - NSDCPEFALRISK_GEN_ALL_CORE
For information on Fall & Injury Prevention, visit: [email or web address] OR  [email or web address] OR  https://www.cdc.gov/steadi/patient.html

## 2024-03-07 LAB
BAKER'S YEAST IGA QN IA: 17 UNITS — SIGNIFICANT CHANGE UP
BAKER'S YEAST IGA QN IA: NEGATIVE — SIGNIFICANT CHANGE UP
BAKER'S YEAST IGG QN IA: 34.2 UNITS — HIGH
BAKER'S YEAST IGG QN IA: ABNORMAL
CULTURE RESULTS: SIGNIFICANT CHANGE UP
SPECIMEN SOURCE: SIGNIFICANT CHANGE UP

## 2024-03-09 LAB
CALPROTECTIN STL-MCNT: >8000 UG/G — HIGH (ref 0–120)
CULTURE RESULTS: SIGNIFICANT CHANGE UP
CULTURE RESULTS: SIGNIFICANT CHANGE UP
SPECIMEN SOURCE: SIGNIFICANT CHANGE UP
SPECIMEN SOURCE: SIGNIFICANT CHANGE UP

## 2024-03-11 LAB
ANTI-A4 FLA2 IGG ELISA: 3.5 EU/ML — SIGNIFICANT CHANGE UP
ANTI-CBIR1 ELISA: 10.7 EU/ML — SIGNIFICANT CHANGE UP
ANTI-FLAX IGG ELISA: 4.3 EU/ML — SIGNIFICANT CHANGE UP
ANTI-OMPC IGA ELISA: < 3.1 EU/ML — SIGNIFICANT CHANGE UP
ASCA IGA ELISA: 15.3 EU/ML — SIGNIFICANT CHANGE UP
ASCA IGG ELISA: 14.5 EU/ML — SIGNIFICANT CHANGE UP
ATG16L1 SNP (RS2241880): SIGNIFICANT CHANGE UP
CRP PROMETHEUS: 176.1 MG/L — SIGNIFICANT CHANGE UP
ECM1 SNP (RS3737240): SIGNIFICANT CHANGE UP
IBD AB 7 PNL SER: SIGNIFICANT CHANGE UP
IBD-SPECIFIC PANCA IFA PATTERN DNASE SENSITIVITY: SIGNIFICANT CHANGE UP
IBD-SPECIFIC PANCA IFA PERINUCLEAR PATTERN: DETECTED — SIGNIFICANT CHANGE UP
ICAM-1 PROMETHEUS: 0.63 UG/ML — SIGNIFICANT CHANGE UP
NKX2-3 SNP (RS10883365): SIGNIFICANT CHANGE UP
PROMETHEUS LABORATORY FOOTER: SIGNIFICANT CHANGE UP
SAA PROMETHEUS: > 181.6 MG/L — SIGNIFICANT CHANGE UP
STAT3 SNP (RS744166): SIGNIFICANT CHANGE UP
VCAM-1 PROMETHEUS: 0.54 UG/ML — SIGNIFICANT CHANGE UP
VEGF PROMETHEUS: 492 PG/ML — SIGNIFICANT CHANGE UP

## 2024-04-05 ENCOUNTER — INPATIENT (INPATIENT)
Facility: HOSPITAL | Age: 39
LOS: 4 days | Discharge: HOME | DRG: 641 | End: 2024-04-10
Attending: INTERNAL MEDICINE | Admitting: INTERNAL MEDICINE
Payer: COMMERCIAL

## 2024-04-05 VITALS
HEART RATE: 138 BPM | SYSTOLIC BLOOD PRESSURE: 149 MMHG | HEIGHT: 75 IN | RESPIRATION RATE: 17 BRPM | WEIGHT: 179.9 LBS | DIASTOLIC BLOOD PRESSURE: 99 MMHG | TEMPERATURE: 97 F | OXYGEN SATURATION: 94 %

## 2024-04-05 DIAGNOSIS — F41.9 ANXIETY DISORDER, UNSPECIFIED: ICD-10-CM

## 2024-04-05 DIAGNOSIS — E86.0 DEHYDRATION: ICD-10-CM

## 2024-04-05 DIAGNOSIS — R19.7 DIARRHEA, UNSPECIFIED: ICD-10-CM

## 2024-04-05 LAB
ALBUMIN SERPL ELPH-MCNC: 1.4 G/DL — LOW (ref 3.3–5)
ALP SERPL-CCNC: 149 U/L — HIGH (ref 40–120)
ALT FLD-CCNC: 18 U/L — SIGNIFICANT CHANGE UP (ref 12–78)
ANION GAP SERPL CALC-SCNC: 13 MMOL/L — SIGNIFICANT CHANGE UP (ref 5–17)
AST SERPL-CCNC: 20 U/L — SIGNIFICANT CHANGE UP (ref 15–37)
BASOPHILS # BLD AUTO: 0 K/UL — SIGNIFICANT CHANGE UP (ref 0–0.2)
BASOPHILS NFR BLD AUTO: 0 % — SIGNIFICANT CHANGE UP (ref 0–2)
BILIRUB SERPL-MCNC: 0.4 MG/DL — SIGNIFICANT CHANGE UP (ref 0.2–1.2)
BUN SERPL-MCNC: 7 MG/DL — SIGNIFICANT CHANGE UP (ref 7–23)
CALCIUM SERPL-MCNC: 8.2 MG/DL — LOW (ref 8.5–10.1)
CHLORIDE SERPL-SCNC: 99 MMOL/L — SIGNIFICANT CHANGE UP (ref 96–108)
CO2 SERPL-SCNC: 24 MMOL/L — SIGNIFICANT CHANGE UP (ref 22–31)
CREAT SERPL-MCNC: 0.99 MG/DL — SIGNIFICANT CHANGE UP (ref 0.5–1.3)
EGFR: 101 ML/MIN/1.73M2 — SIGNIFICANT CHANGE UP
EOSINOPHIL # BLD AUTO: 0 K/UL — SIGNIFICANT CHANGE UP (ref 0–0.5)
EOSINOPHIL NFR BLD AUTO: 0 % — SIGNIFICANT CHANGE UP (ref 0–6)
ERYTHROCYTE [SEDIMENTATION RATE] IN BLOOD: 92 MM/HR — HIGH (ref 0–15)
GLUCOSE SERPL-MCNC: 136 MG/DL — HIGH (ref 70–99)
HCT VFR BLD CALC: 35.4 % — LOW (ref 39–50)
HGB BLD-MCNC: 11.3 G/DL — LOW (ref 13–17)
HIV 1 & 2 AB SERPL IA.RAPID: SIGNIFICANT CHANGE UP
LACTATE SERPL-SCNC: 1.8 MMOL/L — SIGNIFICANT CHANGE UP (ref 0.7–2)
LIDOCAIN IGE QN: 7 U/L — LOW (ref 13–75)
LYMPHOCYTES # BLD AUTO: 1.57 K/UL — SIGNIFICANT CHANGE UP (ref 1–3.3)
LYMPHOCYTES # BLD AUTO: 10 % — LOW (ref 13–44)
MAGNESIUM SERPL-MCNC: 1.8 MG/DL — SIGNIFICANT CHANGE UP (ref 1.6–2.6)
MCHC RBC-ENTMCNC: 27.9 PG — SIGNIFICANT CHANGE UP (ref 27–34)
MCHC RBC-ENTMCNC: 31.9 GM/DL — LOW (ref 32–36)
MCV RBC AUTO: 87.4 FL — SIGNIFICANT CHANGE UP (ref 80–100)
MONOCYTES # BLD AUTO: 1.1 K/UL — HIGH (ref 0–0.9)
MONOCYTES NFR BLD AUTO: 7 % — SIGNIFICANT CHANGE UP (ref 2–14)
NEUTROPHILS # BLD AUTO: 13 K/UL — HIGH (ref 1.8–7.4)
NEUTROPHILS NFR BLD AUTO: 64 % — SIGNIFICANT CHANGE UP (ref 43–77)
NRBC # BLD: SIGNIFICANT CHANGE UP /100 WBCS (ref 0–0)
PLATELET # BLD AUTO: 751 K/UL — HIGH (ref 150–400)
POTASSIUM SERPL-MCNC: 4 MMOL/L — SIGNIFICANT CHANGE UP (ref 3.5–5.3)
POTASSIUM SERPL-SCNC: 4 MMOL/L — SIGNIFICANT CHANGE UP (ref 3.5–5.3)
PROCALCITONIN SERPL-MCNC: 0.25 NG/ML — HIGH
PROT SERPL-MCNC: 7.1 G/DL — SIGNIFICANT CHANGE UP (ref 6–8.3)
RBC # BLD: 4.05 M/UL — LOW (ref 4.2–5.8)
RBC # FLD: 13.4 % — SIGNIFICANT CHANGE UP (ref 10.3–14.5)
SODIUM SERPL-SCNC: 136 MMOL/L — SIGNIFICANT CHANGE UP (ref 135–145)
WBC # BLD: 15.66 K/UL — HIGH (ref 3.8–10.5)
WBC # FLD AUTO: 15.66 K/UL — HIGH (ref 3.8–10.5)

## 2024-04-05 PROCEDURE — 74177 CT ABD & PELVIS W/CONTRAST: CPT | Mod: 26

## 2024-04-05 PROCEDURE — 71260 CT THORAX DX C+: CPT | Mod: 26

## 2024-04-05 PROCEDURE — 99285 EMERGENCY DEPT VISIT HI MDM: CPT

## 2024-04-05 RX ORDER — ACETAMINOPHEN 500 MG
650 TABLET ORAL EVERY 6 HOURS
Refills: 0 | Status: DISCONTINUED | OUTPATIENT
Start: 2024-04-05 | End: 2024-04-10

## 2024-04-05 RX ORDER — SODIUM CHLORIDE 9 MG/ML
1000 INJECTION INTRAMUSCULAR; INTRAVENOUS; SUBCUTANEOUS
Refills: 0 | Status: DISCONTINUED | OUTPATIENT
Start: 2024-04-05 | End: 2024-04-09

## 2024-04-05 RX ORDER — LACTOBACILLUS ACIDOPHILUS 100MM CELL
1 CAPSULE ORAL
Refills: 0 | Status: DISCONTINUED | OUTPATIENT
Start: 2024-04-05 | End: 2024-04-10

## 2024-04-05 RX ORDER — SODIUM CHLORIDE 9 MG/ML
2000 INJECTION INTRAMUSCULAR; INTRAVENOUS; SUBCUTANEOUS ONCE
Refills: 0 | Status: COMPLETED | OUTPATIENT
Start: 2024-04-05 | End: 2024-04-05

## 2024-04-05 RX ORDER — LANOLIN ALCOHOL/MO/W.PET/CERES
3 CREAM (GRAM) TOPICAL AT BEDTIME
Refills: 0 | Status: DISCONTINUED | OUTPATIENT
Start: 2024-04-05 | End: 2024-04-10

## 2024-04-05 RX ORDER — PIPERACILLIN AND TAZOBACTAM 4; .5 G/20ML; G/20ML
3.38 INJECTION, POWDER, LYOPHILIZED, FOR SOLUTION INTRAVENOUS EVERY 8 HOURS
Refills: 0 | Status: DISCONTINUED | OUTPATIENT
Start: 2024-04-06 | End: 2024-04-09

## 2024-04-05 RX ORDER — PIPERACILLIN AND TAZOBACTAM 4; .5 G/20ML; G/20ML
3.38 INJECTION, POWDER, LYOPHILIZED, FOR SOLUTION INTRAVENOUS ONCE
Refills: 0 | Status: COMPLETED | OUTPATIENT
Start: 2024-04-05 | End: 2024-04-05

## 2024-04-05 RX ORDER — ONDANSETRON 8 MG/1
4 TABLET, FILM COATED ORAL EVERY 6 HOURS
Refills: 0 | Status: DISCONTINUED | OUTPATIENT
Start: 2024-04-05 | End: 2024-04-10

## 2024-04-05 RX ORDER — PIPERACILLIN AND TAZOBACTAM 4; .5 G/20ML; G/20ML
3.38 INJECTION, POWDER, LYOPHILIZED, FOR SOLUTION INTRAVENOUS ONCE
Refills: 0 | Status: COMPLETED | OUTPATIENT
Start: 2024-04-05 | End: 2024-04-06

## 2024-04-05 RX ADMIN — PIPERACILLIN AND TAZOBACTAM 200 GRAM(S): 4; .5 INJECTION, POWDER, LYOPHILIZED, FOR SOLUTION INTRAVENOUS at 19:21

## 2024-04-05 RX ADMIN — SODIUM CHLORIDE 4000 MILLILITER(S): 9 INJECTION INTRAMUSCULAR; INTRAVENOUS; SUBCUTANEOUS at 17:59

## 2024-04-05 NOTE — ED PROVIDER NOTE - CLINICAL SUMMARY MEDICAL DECISION MAKING FREE TEXT BOX
Patient seen by GI team and Dr. Macdonald, would like patient to be admitted for IV fluids and hydration prior to getting colonoscopy.  Will check screening lab work for dehydration.  No abdominal pain to suggest intra-abdominal pathology warranting need for CT imaging at this time.  Pending lab work, plan to admit to Dr. Macdonald.

## 2024-04-05 NOTE — CONSULT NOTE ADULT - SUBJECTIVE AND OBJECTIVE BOX
[facility]  Herson Calabrese PA-C  [address] [place], [place] [id number] [phone]      Chief Complaint:  Patient is a 38y old  Male who presents with a chief complaint of hypotension    HPI:  Pt is a 39yo male hx anxiety who presents to the ED for hypotension. Pt was seen in emergency department [facility] with family members at bedside. Pt was admitted to [facility] about 1 month ago for pancolitis and diarrhea. At the time he was positive for ETEC and norovirus. Over the past month he has had continued diarrhea. He has also been losing weight since symptoms began. He was scheduled for an outpatient colonoscopy today, however was found to be hypotensive thus sent to ED. Currently pt is still feeling weak.    Allergies:  No Known Allergies      Medications:  sodium chloride 0.9% Bolus 2000 milliLiter(s) IV Bolus once      PMHX/PSHX:  Anxiety and depression    No significant past surgical history      ROS:     General:  +generalized weakness. No fevers, chills, night sweats, fatigue,   Eyes:  Good vision, no reported pain  ENT:  No sore throat, pain, runny nose, dysphagia  CV:  No pain, palpitations, hypo/hypertension  Resp:  No dyspnea, cough, tachypnea, wheezing  GI:  No pain, No nausea, No vomiting, +diarrhea, No constipation, No weight loss, No fever, No pruritis, No rectal bleeding, No tarry stools, No dysphagia,  :  No pain, bleeding, incontinence, nocturia  Muscle:  No pain, weakness  Neuro:  No weakness, tingling, memory problems  Psych:  No fatigue, insomnia, mood problems, depression  Endocrine:  No polyuria, polydipsia, cold/heat intolerance  Heme:  No petechiae, ecchymosis, easy bruisability  Skin:  No rash, tattoos, scars, edema      PHYSICAL EXAM:   Vital Signs:  Vital Signs Last 24 Hrs  T(C): 36.3 (05 Apr 2024 14:33), Max: 36.3 (05 Apr 2024 14:33)  T(F): 97.3 (05 Apr 2024 14:33), Max: 97.3 (05 Apr 2024 14:33)  HR: 138 (05 Apr 2024 14:33) (138 - 138)  BP: 149/99 (05 Apr 2024 14:33) (149/99 - 149/99)  BP(mean): --  RR: 17 (05 Apr 2024 14:33) (17 - 17)  SpO2: 94% (05 Apr 2024 14:33) (94% - 94%)    Parameters below as of 05 Apr 2024 14:33  Patient On (Oxygen Delivery Method): room air      Daily Height in cm: 190.5 (05 Apr 2024 14:33)    Daily     GENERAL:  Appears stated age  HEENT:  NC/AT  CHEST:  Full & symmetric excursion  HEART:  Regular rhythm  ABDOMEN:  Soft, non-tender, non-distended  EXTEREMITIES:  no cyanosis, clubbing or edema  SKIN:  No rash  NEURO:  Alert

## 2024-04-05 NOTE — ED PROVIDER NOTE - PHYSICAL EXAMINATION
Constitutional: Awake, Alert, non-toxic. No acute distress.  HEAD: Normocephalic, atraumatic.   EYES: PERRL, EOM intact, conjunctiva and sclera are clear bilaterally.  ENT: External ears normal. No rhinorrhea, no tracheal deviation   NECK: Supple, non-tender  CARDIOVASCULAR: tachycardic rate and rhythm.  RESPIRATORY: Normal respiratory effort; breath sounds CTAB, no wheezes, rhonchi, or rales. Speaking in full sentences. No accessory muscle use.   ABDOMEN: Soft; non-tender, non-distended. No rebound or guarding.   MSK:  no lower extremity edema, no deformities  SKIN: Warm, dry  NEURO: A&O x3. Sensory and motor functions are grossly intact. Speech is normal. No facial droop.  PSYCH: Appearance and judgement seem appropriate for gender and age.

## 2024-04-05 NOTE — ED ADULT NURSE NOTE - ED STAT RN HAND OFF
Cyclosporine Pregnancy And Lactation Text: This medication is Pregnancy Category C and it isn't know if it is safe during pregnancy. This medication is excreted in breast milk. Handoff

## 2024-04-05 NOTE — H&P ADULT - NSICDXPASTMEDICALHX_GEN_ALL_CORE_FT
PAST MEDICAL HISTORY:  Anxiety and depression      PAST MEDICAL HISTORY:  Anxiety and depression     Hydrocephalus

## 2024-04-05 NOTE — ED ADULT NURSE REASSESSMENT NOTE - NSFALLUNIVINTERV_ED_ALL_ED
Bed/Stretcher in lowest position, wheels locked, appropriate side rails in place/Call bell, personal items and telephone in reach/Instruct patient to call for assistance before getting out of bed/chair/stretcher/Non-slip footwear applied when patient is off stretcher/[place] to call system/Physically safe environment - no spills, clutter or unnecessary equipment/Purposeful proactive rounding/Room/bathroom lighting operational, light cord in reach

## 2024-04-05 NOTE — ED ADULT NURSE NOTE - NSFALLUNIVINTERV_ED_ALL_ED
Bed/Stretcher in lowest position, wheels locked, appropriate side rails in place/Call bell, personal items and telephone in reach/Instruct patient to call for assistance before getting out of bed/chair/stretcher/Non-slip footwear applied when patient is off stretcher/[place] to call system/Physically safe environment - no spills, clutter or unnecessary equipment/Purposeful proactive rounding/Room/bathroom lighting operational, light cord in reach

## 2024-04-05 NOTE — ED PROVIDER NOTE - OBJECTIVE STATEMENT
Patient with a past medical history of hydrocephalus, is presenting here for dehydration and weight loss.  Has had weight loss over the past month.  Was seen at outside hospital, followed by GI team, told to follow-up for colonoscopy.  While trying to get colonoscopy today, he was hypotensive and tachycardic so sent to ED for evaluation.  Denies any significant abdominal pain.  Still having diarrhea.  No nausea or vomiting.  No fevers.

## 2024-04-05 NOTE — CONSULT NOTE ADULT - ASSESSMENT
Diarrhea  Hypotension  Weight loss    IVF hydration  Follow up labs  Follow up HIV testing  Will need admission for optimization for inpatient egd/colon on Monday  Ok to have diet but switch to clear liquids on Sunday  D/w primary team  D/w pt and family  Will follow    I reviewed the overnight course of events on the unit, re-confirming the patient history. I discussed the care with the patient  Differential diagnosis and plan of care discussed with patient after the evaluation  35 minutes spent on total encounter of which more than fifty percent of the encounter was spent counseling and/or coordinating care by the attending physician.

## 2024-04-05 NOTE — H&P ADULT - PROBLEM SELECTOR PLAN 1
Admit   Unclear etiology  Check GI PCR stool  Diet as tolerated  Probiotics  HIV testing  GI consult  Further work-up/management pending clinical course. Patient with severe sepsis -- leukocytosis with bandemia, tachycardia, hypotension (prior to arrival), and suspected GI source  Admit  Pan-culture  Start empiric iv Zosyn  Trend CBCD  Check lactate  Check CT c/a/p  ID consult

## 2024-04-05 NOTE — ED ADULT NURSE NOTE - CHIEF COMPLAINT QUOTE
pt in wheelchair from doctors office C/O sever dehydration and diarrhea for 1 month. colonoscopy was scheduled today but canceled due to malnutrition. states he has had 30lbs weight loss unintentionally. PMH hydrocephalus, third ventriculostomy at 16.

## 2024-04-05 NOTE — ED ADULT TRIAGE NOTE - CHIEF COMPLAINT QUOTE
pt in wheelchair from doctors office to do a colonoscopy unable to perform because of dehydration and malnourishment. PMH hydrocephalus, third ventriculostomy at 16. pt in wheelchair from doctors office C/O sever dehydration and diarrhea for 1 month. colonoscopy was scheduled today but canceled due to malnutrition. states he has had 30lbs weight loss unintentionally. PMH hydrocephalus, third ventriculostomy at 16.

## 2024-04-05 NOTE — H&P ADULT - HISTORY OF PRESENT ILLNESS
This is a 38 M with PMH of anxiety hydrocephalus who came c/o diarrhea x 2 months. Patient was diagnosed with Norovirus in early March which he recovered from. Patient is having 10-12 episodes of watery diarrhea with specks of blood. Today he was scheduled for an outpatient colonoscopy. He was found to have SBP 70/40 prior to colonoscopy. Patient feels weak and dizzy. He's had a 30lb weight loss in the past month. He denies fevers, n/v/abdominal pain.

## 2024-04-05 NOTE — ED ADULT NURSE REASSESSMENT NOTE - NS ED NURSE REASSESS COMMENT FT1
Pt A&O. Laying in bed. Calm, no distress noted.
Assumed care of Pt from previous RN, awaiting admission bed placement, resting comfortably on stretcher, VSS, will continue to monitor closely.

## 2024-04-05 NOTE — H&P ADULT - PROBLEM SELECTOR PLAN 2
Patient reports that he stopped taking his Zoloft months ago  Will hold for now Unclear etiology  Check GI PCR stool, stool for C diff  Diet as tolerated  Probiotics  HIV testing  Will need EGD/colonoscopy next week to r/o IBD  GI consult  Further work-up/management pending clinical course.

## 2024-04-06 DIAGNOSIS — G91.9 HYDROCEPHALUS, UNSPECIFIED: ICD-10-CM

## 2024-04-06 DIAGNOSIS — A41.9 SEPSIS, UNSPECIFIED ORGANISM: ICD-10-CM

## 2024-04-06 DIAGNOSIS — E43 UNSPECIFIED SEVERE PROTEIN-CALORIE MALNUTRITION: ICD-10-CM

## 2024-04-06 LAB
ALBUMIN SERPL ELPH-MCNC: 1.3 G/DL — LOW (ref 3.3–5)
ALP SERPL-CCNC: 123 U/L — HIGH (ref 40–120)
ALT FLD-CCNC: 13 U/L — SIGNIFICANT CHANGE UP (ref 12–78)
ANION GAP SERPL CALC-SCNC: 11 MMOL/L — SIGNIFICANT CHANGE UP (ref 5–17)
AST SERPL-CCNC: 9 U/L — LOW (ref 15–37)
BASOPHILS # BLD AUTO: 0 K/UL — SIGNIFICANT CHANGE UP (ref 0–0.2)
BASOPHILS NFR BLD AUTO: 0 % — SIGNIFICANT CHANGE UP (ref 0–2)
BILIRUB SERPL-MCNC: 0.6 MG/DL — SIGNIFICANT CHANGE UP (ref 0.2–1.2)
BUN SERPL-MCNC: 8 MG/DL — SIGNIFICANT CHANGE UP (ref 7–23)
C DIFF BY PCR RESULT: SIGNIFICANT CHANGE UP
CALCIUM SERPL-MCNC: 8 MG/DL — LOW (ref 8.5–10.1)
CHLORIDE SERPL-SCNC: 108 MMOL/L — SIGNIFICANT CHANGE UP (ref 96–108)
CO2 SERPL-SCNC: 27 MMOL/L — SIGNIFICANT CHANGE UP (ref 22–31)
CREAT SERPL-MCNC: 0.77 MG/DL — SIGNIFICANT CHANGE UP (ref 0.5–1.3)
CRP SERPL-MCNC: 83 MG/L — HIGH
EGFR: 119 ML/MIN/1.73M2 — SIGNIFICANT CHANGE UP
EOSINOPHIL # BLD AUTO: 0.12 K/UL — SIGNIFICANT CHANGE UP (ref 0–0.5)
EOSINOPHIL NFR BLD AUTO: 1 % — SIGNIFICANT CHANGE UP (ref 0–6)
GI PCR PANEL: SIGNIFICANT CHANGE UP
GLUCOSE SERPL-MCNC: 103 MG/DL — HIGH (ref 70–99)
HCT VFR BLD CALC: 30.1 % — LOW (ref 39–50)
HGB BLD-MCNC: 9.4 G/DL — LOW (ref 13–17)
HIV 1+2 AB+HIV1 P24 AG SERPL QL IA: SIGNIFICANT CHANGE UP
LYMPHOCYTES # BLD AUTO: 1.52 K/UL — SIGNIFICANT CHANGE UP (ref 1–3.3)
LYMPHOCYTES # BLD AUTO: 13 % — SIGNIFICANT CHANGE UP (ref 13–44)
MAGNESIUM SERPL-MCNC: 1.7 MG/DL — SIGNIFICANT CHANGE UP (ref 1.6–2.6)
MCHC RBC-ENTMCNC: 27.5 PG — SIGNIFICANT CHANGE UP (ref 27–34)
MCHC RBC-ENTMCNC: 31.2 GM/DL — LOW (ref 32–36)
MCV RBC AUTO: 88 FL — SIGNIFICANT CHANGE UP (ref 80–100)
MONOCYTES # BLD AUTO: 1.17 K/UL — HIGH (ref 0–0.9)
MONOCYTES NFR BLD AUTO: 10 % — SIGNIFICANT CHANGE UP (ref 2–14)
NEUTROPHILS # BLD AUTO: 8.76 K/UL — HIGH (ref 1.8–7.4)
NEUTROPHILS NFR BLD AUTO: 59 % — SIGNIFICANT CHANGE UP (ref 43–77)
NEUTS BAND # BLD: 16 % — HIGH (ref 0–8)
NRBC # BLD: 0 /100 WBCS — SIGNIFICANT CHANGE UP (ref 0–0)
NRBC # BLD: SIGNIFICANT CHANGE UP /100 WBCS (ref 0–0)
PLAT MORPH BLD: NORMAL — SIGNIFICANT CHANGE UP
PLATELET # BLD AUTO: 641 K/UL — HIGH (ref 150–400)
POLYCHROMASIA BLD QL SMEAR: SLIGHT — SIGNIFICANT CHANGE UP
POTASSIUM SERPL-MCNC: 3.8 MMOL/L — SIGNIFICANT CHANGE UP (ref 3.5–5.3)
POTASSIUM SERPL-SCNC: 3.8 MMOL/L — SIGNIFICANT CHANGE UP (ref 3.5–5.3)
PREALB SERPL-MCNC: 6 MG/DL — LOW (ref 20–40)
PROT SERPL-MCNC: 5.8 G/DL — LOW (ref 6–8.3)
RBC # BLD: 3.42 M/UL — LOW (ref 4.2–5.8)
RBC # FLD: 13.6 % — SIGNIFICANT CHANGE UP (ref 10.3–14.5)
RBC BLD AUTO: SIGNIFICANT CHANGE UP
SODIUM SERPL-SCNC: 146 MMOL/L — HIGH (ref 135–145)
T3 SERPL-MCNC: 99 NG/DL — SIGNIFICANT CHANGE UP (ref 80–200)
T4 AB SER-ACNC: 5.5 UG/DL — SIGNIFICANT CHANGE UP (ref 4.6–12)
TSH SERPL-MCNC: 1.09 UIU/ML — SIGNIFICANT CHANGE UP (ref 0.36–3.74)
VARIANT LYMPHS # BLD: 1 % — SIGNIFICANT CHANGE UP (ref 0–6)
WBC # BLD: 11.68 K/UL — HIGH (ref 3.8–10.5)
WBC # FLD AUTO: 11.68 K/UL — HIGH (ref 3.8–10.5)

## 2024-04-06 PROCEDURE — 99222 1ST HOSP IP/OBS MODERATE 55: CPT

## 2024-04-06 RX ORDER — INFLUENZA VIRUS VACCINE 15; 15; 15; 15 UG/.5ML; UG/.5ML; UG/.5ML; UG/.5ML
0.5 SUSPENSION INTRAMUSCULAR ONCE
Refills: 0 | Status: DISCONTINUED | OUTPATIENT
Start: 2024-04-06 | End: 2024-04-10

## 2024-04-06 RX ADMIN — PIPERACILLIN AND TAZOBACTAM 25 GRAM(S): 4; .5 INJECTION, POWDER, LYOPHILIZED, FOR SOLUTION INTRAVENOUS at 00:11

## 2024-04-06 RX ADMIN — SODIUM CHLORIDE 125 MILLILITER(S): 9 INJECTION INTRAMUSCULAR; INTRAVENOUS; SUBCUTANEOUS at 02:04

## 2024-04-06 RX ADMIN — Medication 1 TABLET(S): at 12:00

## 2024-04-06 RX ADMIN — PIPERACILLIN AND TAZOBACTAM 25 GRAM(S): 4; .5 INJECTION, POWDER, LYOPHILIZED, FOR SOLUTION INTRAVENOUS at 21:38

## 2024-04-06 RX ADMIN — Medication 1 TABLET(S): at 17:33

## 2024-04-06 RX ADMIN — PIPERACILLIN AND TAZOBACTAM 25 GRAM(S): 4; .5 INJECTION, POWDER, LYOPHILIZED, FOR SOLUTION INTRAVENOUS at 14:09

## 2024-04-06 RX ADMIN — PIPERACILLIN AND TAZOBACTAM 25 GRAM(S): 4; .5 INJECTION, POWDER, LYOPHILIZED, FOR SOLUTION INTRAVENOUS at 06:29

## 2024-04-06 NOTE — CONSULT NOTE ADULT - SUBJECTIVE AND OBJECTIVE BOX
[facility]  INFECTIOUS DISEASES - [address], [place] [address]  Tel: [phone]     Fax: [phone]  =======================================================    N-[id number]  MARGRET GRANDA     CC: Patient is a 38y old  Male who presents with a chief complaint of diarrhea (06 Apr 2024 12:58)    HPI:  38 M with PMH of anxiety hydrocephalus, who came c/o diarrhea x 2 months. Patient was diagnosed with Norovirus and etec in early March. Patient reported still having diarrhea. Denies any fevers, nausea or vomiting. Reports weight loss 40 lbs. Yesterday he was scheduled for an outpatient colonoscopy, found to have SBP 70/40 prior to colonoscopy then was advised admission.     Patient eating lunch, reports feeling better today. Denies any sick contacts or recent travel. Was born in [place] and has not lived outside [place]. 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 Denies having pets. Vapes but denies smoking cigarettes, alcohol or drug use.    PAST MEDICAL & SURGICAL HISTORY:  Anxiety and depression      Hydrocephalus          Social Hx:     FAMILY HISTORY:      Allergies    No Known Allergies    Intolerances        Antibiotics:  MEDICATIONS  (STANDING):  lactobacillus acidophilus 1 Tablet(s) Oral three times a day with meals  piperacillin/tazobactam IVPB.. 3.375 Gram(s) IV Intermittent every 8 hours  sodium chloride 0.9%. 1000 milliLiter(s) (125 mL/Hr) IV Continuous <Continuous>    MEDICATIONS  (PRN):  acetaminophen     Tablet .. 650 milliGRAM(s) Oral every 6 hours PRN Temp greater or equal to 38C (100.4F), Mild Pain (1 - 3)  aluminum hydroxide/magnesium hydroxide/simethicone Suspension 30 milliLiter(s) Oral every 4 hours PRN Dyspepsia  melatonin 3 milliGRAM(s) Oral at bedtime PRN Insomnia  ondansetron Injectable 4 milliGRAM(s) IV Push every 6 hours PRN Nausea and/or Vomiting       REVIEW OF SYSTEMS:  CONSTITUTIONAL:  No Fevers  HEENT:  No sore throat or runny nose.  CARDIOVASCULAR:  No chest pain or SOB.  RESPIRATORY:  No cough, shortness of breath  GASTROINTESTINAL:  see history  GENITOURINARY:  No dysuria, frequency or urgency  MUSCULOSKELETAL:  no joint aches, no muscle pain  NEUROLOGIC:  No headache or dizziness  PSYCHIATRIC:  No disorder of thought or mood.    Physical Exam:  Vital Signs Last 24 Hrs  T(C): 37.1 (06 Apr 2024 11:45), Max: 37.1 (06 Apr 2024 11:45)  T(F): 98.8 (06 Apr 2024 11:45), Max: 98.8 (06 Apr 2024 11:45)  HR: 106 (06 Apr 2024 11:45) (90 - 138)  BP: 116/77 (06 Apr 2024 11:45) (113/66 - 149/99)  BP(mean): --  RR: 18 (06 Apr 2024 11:45) (16 - 18)  SpO2: 97% (06 Apr 2024 11:45) (94% - 100%)    Parameters below as of 06 Apr 2024 11:45  Patient On (Oxygen Delivery Method): room air      Height (cm): 190.5 (04-05 @ 14:33)  Weight (kg): 81.6 (04-05 @ 14:33)  BMI (kg/m2): 22.5 (04-05 @ 14:33)  BSA (m2): 2.1 (04-05 @ 14:33)  GEN: NAD  HEENT: normocephalic and atraumatic.   NECK: Supple.   LUNGS: Normal respiratory effort  HEART: Tachycardic  ABDOMEN: Soft, nontender, and nondistended.    EXTREMITIES: No leg edema.  NEUROLOGIC: grossly intact.  PSYCHIATRIC: Appropriate affect .      Labs:  04-06    146<H>  |  108  |  8   ----------------------------<  103<H>  3.8   |  27  |  0.77    Ca    8.0<L>      06 Apr 2024 06:50  Mg     1.7     04-06    TPro  5.8<L>  /  Alb  1.3<L>  /  TBili  0.6  /  DBili  x   /  AST  9<L>  /  ALT  13  /  AlkPhos  123<H>  04-06                          9.4    11.68 )-----------( 641      ( 06 Apr 2024 06:50 )             30.1       Urinalysis Basic - ( 06 Apr 2024 06:50 )    Color: x / Appearance: x / SG: x / pH: x  Gluc: 103 mg/dL / Ketone: x  / Bili: x / Urobili: x   Blood: x / Protein: x / Nitrite: x   Leuk Esterase: x / RBC: x / WBC x   Sq Epi: x / Non Sq Epi: x / Bacteria: x      LIVER FUNCTIONS - ( 06 Apr 2024 06:50 )  Alb: 1.3 g/dL / Pro: 5.8 g/dL / ALK PHOS: 123 U/L / ALT: 13 U/L / AST: 9 U/L / GGT: x                 Procalcitonin, Serum: 0.25 ng/mL (04-05-24 @ 16:40)    C-Reactive Protein, Serum: 83 mg/L (04-05-24 @ 16:40)    Sedimentation Rate, Erythrocyte: 92 mm/hr (04-05-24 @ 16:40)        RECENT CULTURES:        All imaging and other data have been reviewed.    < from: CT Chest w/ IV Cont (04.05.24 @ 18:53) >  FINDINGS:  CHEST:  LUNGS AND LARGE AIRWAYS: Patent central airways. No pulmonary nodules.  PLEURA: No pleural effusion.  VESSELS: Within normal limits.  HEART: Heart size is normal. No pericardial effusion.  MEDIASTINUM AND ARMANDO: No lymphadenopathy.  CHEST WALL AND LOWER NECK: Within normal limits.    ABDOMEN AND PELVIS:  LIVER: Within normal limits.  BILE DUCTS: Normal caliber.  GALLBLADDER: Within normal limits.  SPLEEN: Within normal limits.  PANCREAS: Within normal limits.  ADRENALS: Within normal limits.  KIDNEYS/URETERS: Within normal limits.    BLADDER: Within normal limits.  REPRODUCTIVE ORGANS: Prostate within normal limits.    BOWEL: No bowel obstruction. Wall thickening ofthe entire colon   extending from the cecum to the rectum, slightly improved compared to   prior examination 2 mm density along the course of the SMV extending   cephalad from the rectosigmoid junction, possibly reflecting   thrombophlebitis with surrounding inflammatory change.  PERITONEUM: No ascites. No pneumoperitoneum or drainable fluid collection.  VESSELS: Within normal limits.  RETROPERITONEUM/LYMPH NODES: No lymphadenopathy.  ABDOMINAL WALL: Within normal limits.  BONES: Within normal limits.    IMPRESSION:  Pancolitis, slightly decrease as compared to prior examination 3/4/2024.    Tubular density along the course of a branch of the IMV, possibly   reflecting thrombophlebitis.    < end of copied text >

## 2024-04-06 NOTE — CONSULT NOTE ADULT - ASSESSMENT
38 M with PMH of anxiety hydrocephalus, who came c/o diarrhea x 2 months. Patient was diagnosed with Norovirus and etec in early March. Patient reported still having diarrhea, and also with weight loss.    Found to have leukocytosis with bandemia, likely 2/2 pancolitis. No fevers and report feeling better today. C diff pending and GI PCR negative. HIV negative. Concern that colitis could be inflammatory in etiology. Plan for EGD/colonoscopy this Monday.     #Colitis  #Leukocytosis  #Bandemia  #Diarrhea    -continue Zosyn pending blood cultures, discontinue if no growth  -follow up stool for c diff  -follow blood cultures  -check CMV PCR  -monitor WBC  -discussed with mother at bedside    Thank you for courtesy of this consult.     Will follow.  Discussed with the primary team.     Shavonne Dillon MD  Division of Infectious Diseases   Cell [phone] between 8am and 6pm   After 6pm and weekends please call ID service at [phone].     55 minutes spent on total encounter assessing patient, examination, chart review, counseling and coordinating care by the attending physician/nurse/care manager.

## 2024-04-06 NOTE — PATIENT PROFILE ADULT - NSPROPOAURINARYCATHETER_GEN_A_NUR
[facility] Night Shift Note    Date: 6/11/2023   Number of hours slept: at least 8   If awake during night, list times:    PRN Medication Given (list type):   Behaviors observed:    Patient concerns reported:    Other:      Andrew Chu   no

## 2024-04-06 NOTE — ED ADULT NURSE REASSESSMENT NOTE - GENERAL PATIENT STATE
comfortable appearance/cooperative
comfortable appearance
comfortable appearance/cooperative/resting/sleeping

## 2024-04-06 NOTE — PATIENT PROFILE ADULT - FALL HARM RISK - HARM RISK INTERVENTIONS
Assistance with ambulation/Communicate Risk of Fall with Harm to all staff/Reinforce activity limits and safety measures with patient and family/Tailored Fall Risk Interventions/Visual Cue: Yellow wristband and red socks/Bed in lowest position, wheels locked, appropriate side rails in place/Call bell, personal items and telephone in reach/Instruct patient to call for assistance before getting out of bed or chair/Non-slip footwear when patient is out of bed/[place] to call system/Physically safe environment - no spills, clutter or unnecessary equipment/Purposeful Proactive Rounding/Room/bathroom lighting operational, light cord in reach Assistance with ambulation/Assistance OOB with selected safe patient handling equipment/Communicate Risk of Fall with Harm to all staff/Reinforce activity limits and safety measures with patient and family/Tailored Fall Risk Interventions/Visual Cue: Yellow wristband and red socks/Bed in lowest position, wheels locked, appropriate side rails in place/Call bell, personal items and telephone in reach/Instruct patient to call for assistance before getting out of bed or chair/Non-slip footwear when patient is out of bed/[place] to call system/Physically safe environment - no spills, clutter or unnecessary equipment/Purposeful Proactive Rounding/Room/bathroom lighting operational, light cord in reach

## 2024-04-06 NOTE — PROGRESS NOTE ADULT - PROBLEM SELECTOR PLAN 1
Patient with severe sepsis -- leukocytosis with bandemia, tachycardia, hypotension, and pan-colitis  Continue empiric iv Zosyn  Trend CBCD  F/U culture data  ID consult

## 2024-04-07 LAB
ALBUMIN SERPL ELPH-MCNC: 1.1 G/DL — LOW (ref 3.3–5)
ALP SERPL-CCNC: 97 U/L — SIGNIFICANT CHANGE UP (ref 40–120)
ALT FLD-CCNC: 11 U/L — LOW (ref 12–78)
ANION GAP SERPL CALC-SCNC: 8 MMOL/L — SIGNIFICANT CHANGE UP (ref 5–17)
AST SERPL-CCNC: 10 U/L — LOW (ref 15–37)
BASOPHILS # BLD AUTO: 0.03 K/UL — SIGNIFICANT CHANGE UP (ref 0–0.2)
BASOPHILS NFR BLD AUTO: 0.3 % — SIGNIFICANT CHANGE UP (ref 0–2)
BILIRUB SERPL-MCNC: 0.4 MG/DL — SIGNIFICANT CHANGE UP (ref 0.2–1.2)
BUN SERPL-MCNC: 6 MG/DL — LOW (ref 7–23)
CALCIUM SERPL-MCNC: 8 MG/DL — LOW (ref 8.5–10.1)
CHLORIDE SERPL-SCNC: 106 MMOL/L — SIGNIFICANT CHANGE UP (ref 96–108)
CO2 SERPL-SCNC: 26 MMOL/L — SIGNIFICANT CHANGE UP (ref 22–31)
CREAT SERPL-MCNC: 0.59 MG/DL — SIGNIFICANT CHANGE UP (ref 0.5–1.3)
EGFR: 129 ML/MIN/1.73M2 — SIGNIFICANT CHANGE UP
EOSINOPHIL # BLD AUTO: 0.23 K/UL — SIGNIFICANT CHANGE UP (ref 0–0.5)
EOSINOPHIL NFR BLD AUTO: 2.5 % — SIGNIFICANT CHANGE UP (ref 0–6)
GLUCOSE SERPL-MCNC: 101 MG/DL — HIGH (ref 70–99)
HCT VFR BLD CALC: 26.4 % — LOW (ref 39–50)
HGB BLD-MCNC: 8.3 G/DL — LOW (ref 13–17)
IMM GRANULOCYTES NFR BLD AUTO: 1.4 % — HIGH (ref 0–0.9)
LYMPHOCYTES # BLD AUTO: 1.12 K/UL — SIGNIFICANT CHANGE UP (ref 1–3.3)
LYMPHOCYTES # BLD AUTO: 12.4 % — LOW (ref 13–44)
MAGNESIUM SERPL-MCNC: 1.8 MG/DL — SIGNIFICANT CHANGE UP (ref 1.6–2.6)
MCHC RBC-ENTMCNC: 27.9 PG — SIGNIFICANT CHANGE UP (ref 27–34)
MCHC RBC-ENTMCNC: 31.4 GM/DL — LOW (ref 32–36)
MCV RBC AUTO: 88.9 FL — SIGNIFICANT CHANGE UP (ref 80–100)
MONOCYTES # BLD AUTO: 0.76 K/UL — SIGNIFICANT CHANGE UP (ref 0–0.9)
MONOCYTES NFR BLD AUTO: 8.4 % — SIGNIFICANT CHANGE UP (ref 2–14)
NEUTROPHILS # BLD AUTO: 6.77 K/UL — SIGNIFICANT CHANGE UP (ref 1.8–7.4)
NEUTROPHILS NFR BLD AUTO: 75 % — SIGNIFICANT CHANGE UP (ref 43–77)
NRBC # BLD: 0 /100 WBCS — SIGNIFICANT CHANGE UP (ref 0–0)
PLATELET # BLD AUTO: 515 K/UL — HIGH (ref 150–400)
POTASSIUM SERPL-MCNC: 3.5 MMOL/L — SIGNIFICANT CHANGE UP (ref 3.5–5.3)
POTASSIUM SERPL-SCNC: 3.5 MMOL/L — SIGNIFICANT CHANGE UP (ref 3.5–5.3)
PROT SERPL-MCNC: 5.1 G/DL — LOW (ref 6–8.3)
RBC # BLD: 2.97 M/UL — LOW (ref 4.2–5.8)
RBC # FLD: 13.4 % — SIGNIFICANT CHANGE UP (ref 10.3–14.5)
SODIUM SERPL-SCNC: 140 MMOL/L — SIGNIFICANT CHANGE UP (ref 135–145)
WBC # BLD: 9.04 K/UL — SIGNIFICANT CHANGE UP (ref 3.8–10.5)
WBC # FLD AUTO: 9.04 K/UL — SIGNIFICANT CHANGE UP (ref 3.8–10.5)

## 2024-04-07 PROCEDURE — 99232 SBSQ HOSP IP/OBS MODERATE 35: CPT

## 2024-04-07 RX ORDER — SOD SULF/SODIUM/NAHCO3/KCL/PEG
1000 SOLUTION, RECONSTITUTED, ORAL ORAL EVERY 4 HOURS
Refills: 0 | Status: COMPLETED | OUTPATIENT
Start: 2024-04-07 | End: 2024-04-07

## 2024-04-07 RX ADMIN — Medication 1 TABLET(S): at 13:02

## 2024-04-07 RX ADMIN — Medication 1000 MILLILITER(S): at 21:06

## 2024-04-07 RX ADMIN — SODIUM CHLORIDE 125 MILLILITER(S): 9 INJECTION INTRAMUSCULAR; INTRAVENOUS; SUBCUTANEOUS at 18:15

## 2024-04-07 RX ADMIN — Medication 10 MILLIGRAM(S): at 21:07

## 2024-04-07 RX ADMIN — Medication 1000 MILLILITER(S): at 18:36

## 2024-04-07 RX ADMIN — Medication 1 TABLET(S): at 08:01

## 2024-04-07 RX ADMIN — PIPERACILLIN AND TAZOBACTAM 25 GRAM(S): 4; .5 INJECTION, POWDER, LYOPHILIZED, FOR SOLUTION INTRAVENOUS at 21:07

## 2024-04-07 RX ADMIN — PIPERACILLIN AND TAZOBACTAM 25 GRAM(S): 4; .5 INJECTION, POWDER, LYOPHILIZED, FOR SOLUTION INTRAVENOUS at 13:24

## 2024-04-07 RX ADMIN — Medication 10 MILLIGRAM(S): at 18:56

## 2024-04-07 RX ADMIN — Medication 1 TABLET(S): at 18:15

## 2024-04-07 RX ADMIN — PIPERACILLIN AND TAZOBACTAM 25 GRAM(S): 4; .5 INJECTION, POWDER, LYOPHILIZED, FOR SOLUTION INTRAVENOUS at 05:02

## 2024-04-07 NOTE — CARE COORDINATION ASSESSMENT. - MET/SPOKE WITH
stepfather, pt gave permission for family members to be privy to his information/patient/father/mother

## 2024-04-07 NOTE — GOALS OF CARE CONVERSATION - ADVANCED CARE PLANNING - NAME OF PERSON WHO ASSISTED
Primary HCP is patient's mother, Mrs. Lalitha Balbuena, @ [phone]; 2ndary HCP is patient's cousin, Ms. Kamara Camille, @ [phone]

## 2024-04-07 NOTE — PROGRESS NOTE ADULT - PROBLEM SELECTOR PLAN 1
Patient with severe sepsis -- leukocytosis with bandemia, tachycardia, hypotension, and pan-colitis -- resolving  Continue empiric iv Zosyn for now  Trend CBCD  F/U culture data -- NTD  ID f/u

## 2024-04-07 NOTE — CARE COORDINATION ASSESSMENT. - OTHER PERTINENT DISCHARGE PLANNING INFORMATION:
CM met with the patient at the bedside, educated pt on role of CM and transition planning. Patient verbalized understanding. CM provided direct contact/resource folder and remains available. Pt resides in a house with his father, has 5 steps to enter and a flight upstairs. Per patient he is independent with ADL's, ambulating and negotiating steps. Denies any DME or prior home care services. Pharmacy [facility] [place].

## 2024-04-07 NOTE — CAREGIVER ENGAGEMENT NOTE - CAREGIVER EDUCATION NOTES - FREE TEXT
met w/ patient, his mother, his father, and his step-father @ bedside on [address] for completion of Health Care Proxy documentation per their request. Patient identified his primary HCP as his patient's mother, Mrs. Lalitha Balbuena, @ [phone], and his 2ndary HCP as his patient's cousin, Ms. Anh Obrien, @ [phone]. Document completed, copy given to patient's family, and original placed in hard chart on unit.  to remain available for any continued needs.

## 2024-04-07 NOTE — CARE COORDINATION ASSESSMENT. - NSCAREPROVIDERS_GEN_ALL_CORE_FT
CARE PROVIDERS:  Accepting Physician: Nik Macdonald  Administration: Fermin Sharp  Administration: Kris Sanderson  Admitting: Nik Macdonald  Attending: Nik Macdonald  Consultant: Levy Morris  Consultant: Kori Calabrese  Consultant: Shavonne Dillon  Consultant: Madina Ramirez ED Attending: Dioni Jaquez ED Nurse: Chantel Villagran  Nurse: Niki Ca  Nurse: Ruby Auguste  Ordered: Doctor, Unknown  Ordered: ADM, User  Outpatient Provider: Syed William  Outpatient Provider: Herson Macdonald  Outpatient Provider: Case Wade  PCA/Nursing Assistant: Yvrose Moeller  PCA/Nursing Assistant: Yashira Jackson  Registered Dietitian: Ninoska Dong

## 2024-04-07 NOTE — SOCIAL WORK PROGRESS NOTE - NSSWPROGRESSNOTE_GEN_ALL_CORE
met w/ patient, his mother, his father, and his step-father @ bedside on unit [address] for completion of Health Care Proxy documentation per their request. Patient identified his primary HCP as his patient's mother, Mrs. Lalitha Balbuena, @ [phone], and his 2ndary HCP as his patient's cousin, Ms. Anh Obrien, @ [phone]. Document completed, copy given to patient's family, and original placed in hard chart on unit.  to remain available for any continued needs.

## 2024-04-07 NOTE — GOALS OF CARE CONVERSATION - ADVANCED CARE PLANNING - CONVERSATION DETAILS
met w/ patient, his mother, his father, and his step-father @ bedside on [facility] for completion of Health Care Proxy documentation per their request. Patient identified his primary HCP as his patient's mother, Mrs. Lalitha Balbuena, @ [phone], and his 2ndary HCP as his patient's cousin, Ms. Anh Obrien, @ [phone]. Document completed, copy given to patient's family, and original placed in hard chart on unit.  to remain available for any continued needs.

## 2024-04-08 ENCOUNTER — TRANSCRIPTION ENCOUNTER (OUTPATIENT)
Age: 39
End: 2024-04-08

## 2024-04-08 DIAGNOSIS — D64.9 ANEMIA, UNSPECIFIED: ICD-10-CM

## 2024-04-08 LAB
ALBUMIN SERPL ELPH-MCNC: 1.2 G/DL — LOW (ref 3.3–5)
ALP SERPL-CCNC: 97 U/L — SIGNIFICANT CHANGE UP (ref 40–120)
ALT FLD-CCNC: 13 U/L — SIGNIFICANT CHANGE UP (ref 12–78)
ANION GAP SERPL CALC-SCNC: 5 MMOL/L — SIGNIFICANT CHANGE UP (ref 5–17)
AST SERPL-CCNC: 11 U/L — LOW (ref 15–37)
BASOPHILS # BLD AUTO: 0.04 K/UL — SIGNIFICANT CHANGE UP (ref 0–0.2)
BASOPHILS NFR BLD AUTO: 0.6 % — SIGNIFICANT CHANGE UP (ref 0–2)
BILIRUB DIRECT SERPL-MCNC: 0.2 MG/DL — SIGNIFICANT CHANGE UP (ref 0–0.3)
BILIRUB INDIRECT FLD-MCNC: 0.2 MG/DL — SIGNIFICANT CHANGE UP (ref 0.2–1)
BILIRUB SERPL-MCNC: 0.4 MG/DL — SIGNIFICANT CHANGE UP (ref 0.2–1.2)
BUN SERPL-MCNC: 4 MG/DL — LOW (ref 7–23)
CALCIUM SERPL-MCNC: 8.1 MG/DL — LOW (ref 8.5–10.1)
CHLORIDE SERPL-SCNC: 109 MMOL/L — HIGH (ref 96–108)
CMV DNA CSF QL NAA+PROBE: SIGNIFICANT CHANGE UP IU/ML
CMV DNA SPEC NAA+PROBE-LOG#: SIGNIFICANT CHANGE UP LOG10IU/ML
CO2 SERPL-SCNC: 29 MMOL/L — SIGNIFICANT CHANGE UP (ref 22–31)
CREAT SERPL-MCNC: 0.59 MG/DL — SIGNIFICANT CHANGE UP (ref 0.5–1.3)
EGFR: 129 ML/MIN/1.73M2 — SIGNIFICANT CHANGE UP
EOSINOPHIL # BLD AUTO: 0.35 K/UL — SIGNIFICANT CHANGE UP (ref 0–0.5)
EOSINOPHIL NFR BLD AUTO: 5.3 % — SIGNIFICANT CHANGE UP (ref 0–6)
FERRITIN SERPL-MCNC: 299 NG/ML — SIGNIFICANT CHANGE UP (ref 30–400)
FOLATE SERPL-MCNC: 7 NG/ML — SIGNIFICANT CHANGE UP
GLUCOSE SERPL-MCNC: 93 MG/DL — SIGNIFICANT CHANGE UP (ref 70–99)
HAPTOGLOB SERPL-MCNC: 378 MG/DL — HIGH (ref 34–200)
HCT VFR BLD CALC: 26.1 % — LOW (ref 39–50)
HGB BLD-MCNC: 8.3 G/DL — LOW (ref 13–17)
IMM GRANULOCYTES NFR BLD AUTO: 1.5 % — HIGH (ref 0–0.9)
IRON SATN MFR SERPL: 27 % — SIGNIFICANT CHANGE UP (ref 16–55)
IRON SATN MFR SERPL: 29 UG/DL — LOW (ref 45–165)
LDH SERPL L TO P-CCNC: 80 U/L — SIGNIFICANT CHANGE UP (ref 50–242)
LYMPHOCYTES # BLD AUTO: 1.47 K/UL — SIGNIFICANT CHANGE UP (ref 1–3.3)
LYMPHOCYTES # BLD AUTO: 22.3 % — SIGNIFICANT CHANGE UP (ref 13–44)
MAGNESIUM SERPL-MCNC: 1.9 MG/DL — SIGNIFICANT CHANGE UP (ref 1.6–2.6)
MCHC RBC-ENTMCNC: 27.9 PG — SIGNIFICANT CHANGE UP (ref 27–34)
MCHC RBC-ENTMCNC: 31.8 GM/DL — LOW (ref 32–36)
MCV RBC AUTO: 87.9 FL — SIGNIFICANT CHANGE UP (ref 80–100)
MONOCYTES # BLD AUTO: 0.67 K/UL — SIGNIFICANT CHANGE UP (ref 0–0.9)
MONOCYTES NFR BLD AUTO: 10.2 % — SIGNIFICANT CHANGE UP (ref 2–14)
NEUTROPHILS # BLD AUTO: 3.95 K/UL — SIGNIFICANT CHANGE UP (ref 1.8–7.4)
NEUTROPHILS NFR BLD AUTO: 60.1 % — SIGNIFICANT CHANGE UP (ref 43–77)
NRBC # BLD: 0 /100 WBCS — SIGNIFICANT CHANGE UP (ref 0–0)
PLATELET # BLD AUTO: 512 K/UL — HIGH (ref 150–400)
POTASSIUM SERPL-MCNC: 3.4 MMOL/L — LOW (ref 3.5–5.3)
POTASSIUM SERPL-SCNC: 3.4 MMOL/L — LOW (ref 3.5–5.3)
PROT SERPL-MCNC: 5.1 G/DL — LOW (ref 6–8.3)
RBC # BLD: 2.97 M/UL — LOW (ref 4.2–5.8)
RBC # BLD: 2.97 M/UL — LOW (ref 4.2–5.8)
RBC # FLD: 13.7 % — SIGNIFICANT CHANGE UP (ref 10.3–14.5)
RETICS #: 99.2 K/UL — SIGNIFICANT CHANGE UP (ref 25–125)
RETICS/RBC NFR: 3.3 % — HIGH (ref 0.5–2.5)
SODIUM SERPL-SCNC: 143 MMOL/L — SIGNIFICANT CHANGE UP (ref 135–145)
TIBC SERPL-MCNC: 105 UG/DL — LOW (ref 220–430)
UIBC SERPL-MCNC: 77 UG/DL — LOW (ref 110–370)
VIT B12 SERPL-MCNC: >2000 PG/ML — HIGH (ref 232–1245)
WBC # BLD: 6.58 K/UL — SIGNIFICANT CHANGE UP (ref 3.8–10.5)
WBC # FLD AUTO: 6.58 K/UL — SIGNIFICANT CHANGE UP (ref 3.8–10.5)

## 2024-04-08 PROCEDURE — 88305 TISSUE EXAM BY PATHOLOGIST: CPT | Mod: 26

## 2024-04-08 PROCEDURE — 88312 SPECIAL STAINS GROUP 1: CPT | Mod: 26

## 2024-04-08 PROCEDURE — 88313 SPECIAL STAINS GROUP 2: CPT | Mod: 26

## 2024-04-08 DEVICE — HEMOSPRAY HEMOSTAT ENDO 7F: Type: IMPLANTABLE DEVICE | Status: FUNCTIONAL

## 2024-04-08 DEVICE — CLIP RESOLUTION 360 235CM: Type: IMPLANTABLE DEVICE | Status: FUNCTIONAL

## 2024-04-08 RX ORDER — MESALAMINE 400 MG
1000 TABLET, DELAYED RELEASE (ENTERIC COATED) ORAL
Refills: 0 | Status: DISCONTINUED | OUTPATIENT
Start: 2024-04-08 | End: 2024-04-10

## 2024-04-08 RX ORDER — POTASSIUM CHLORIDE 20 MEQ
10 PACKET (EA) ORAL
Refills: 0 | Status: DISCONTINUED | OUTPATIENT
Start: 2024-04-08 | End: 2024-04-08

## 2024-04-08 RX ADMIN — Medication 100 MILLIEQUIVALENT(S): at 10:12

## 2024-04-08 RX ADMIN — Medication 1000 MILLIGRAM(S): at 19:35

## 2024-04-08 RX ADMIN — Medication 20 MILLIGRAM(S): at 22:36

## 2024-04-08 RX ADMIN — Medication 10 MILLIGRAM(S): at 16:09

## 2024-04-08 RX ADMIN — PIPERACILLIN AND TAZOBACTAM 25 GRAM(S): 4; .5 INJECTION, POWDER, LYOPHILIZED, FOR SOLUTION INTRAVENOUS at 16:09

## 2024-04-08 RX ADMIN — Medication 1 TABLET(S): at 08:02

## 2024-04-08 RX ADMIN — PIPERACILLIN AND TAZOBACTAM 25 GRAM(S): 4; .5 INJECTION, POWDER, LYOPHILIZED, FOR SOLUTION INTRAVENOUS at 05:11

## 2024-04-08 RX ADMIN — PIPERACILLIN AND TAZOBACTAM 25 GRAM(S): 4; .5 INJECTION, POWDER, LYOPHILIZED, FOR SOLUTION INTRAVENOUS at 23:20

## 2024-04-08 RX ADMIN — Medication 1 TABLET(S): at 16:50

## 2024-04-08 NOTE — CONSULT NOTE ADULT - CONSULT REASON
D63.8  Anemia chronic disease D63.8   Anemia due to chronic disease  K51.0   pancolitis  R19.5   bloody diarrhea

## 2024-04-08 NOTE — DIETITIAN INITIAL EVALUATION ADULT - SIGNS/SYMPTOMS
as evidenced by diarrhea X 2 months as evidenced by mild muscle/fat loss, 18% weight loss, & < 50% of estimated energy intake > 5 days

## 2024-04-08 NOTE — DIETITIAN INITIAL EVALUATION ADULT - PERTINENT MEDS FT
MEDICATIONS  (STANDING):  bisacodyl 10 milliGRAM(s) Oral <User Schedule>  influenza   Vaccine 0.5 milliLiter(s) IntraMuscular once  lactobacillus acidophilus 1 Tablet(s) Oral three times a day with meals  piperacillin/tazobactam IVPB.. 3.375 Gram(s) IV Intermittent every 8 hours  sodium chloride 0.9%. 1000 milliLiter(s) (125 mL/Hr) IV Continuous <Continuous>    MEDICATIONS  (PRN):  acetaminophen     Tablet .. 650 milliGRAM(s) Oral every 6 hours PRN Temp greater or equal to 38C (100.4F), Mild Pain (1 - 3)  aluminum hydroxide/magnesium hydroxide/simethicone Suspension 30 milliLiter(s) Oral every 4 hours PRN Dyspepsia  melatonin 3 milliGRAM(s) Oral at bedtime PRN Insomnia  ondansetron Injectable 4 milliGRAM(s) IV Push every 6 hours PRN Nausea and/or Vomiting

## 2024-04-08 NOTE — CONSULT NOTE ADULT - ASSESSMENT
INCOMPLETE NOTE.  Documentation in Progress  PT SEEN AND EVALUATED.   FULL/ADDITIONAL RECOMMENDATIONS TO FOLLOW   ***************************************************************    [ASSESSMENT and  PLAN]        RECOMMENDATIONS  Transfuse PRBC as clinically indicated.   Transfuse PRBC if Hgb <7.0 or if symptomatic.   Follow CBC    Check Anemia studies.      Ferritin, Iron studies     B12, Folate     ESR, CRP    DVT Prophylaxis  SQ Lovenox or SQ heparin    Discussed plan of care with patient and or family in detail.   Pt/Family expressed understanding of the treatment plan.   Risks, benefits and alternatives discussed in detail.   Opportunity given for questions and discussion.   Questions or concerns all addressed and answered to their satisfaction, and in lay terms.     Discussed with  xxxxxxx.    Thank you for consulting us.     > xxxxxx minutes spent in direct patient care, examining and counseling patient,  reviewing  the notes, lab data/ imaging , discussion with multidisciplinary team.      [ASSESSMENT and  PLAN]  D63.8   Anemia due to chronic disease  K51.0   pancolitis  R19.5   bloody diarrhea      38 M with PMH of anxiety hydrocephalus, who came c/o diarrhea x 2 months.   In early March 2024 diagnosed with Norovirus and ETEC [E Coli]   Since still with diarrhea, progressed to now include wt loss.   Outp GI workup with EGD and colonoscopy planned  But unable to be performed outpt due to dehydration and hypotension.   Admitted to hospital.     Found to have leukocytosis with bandemia, likely 2/2 pancolitis.   Seen by ID, felt Less likely infectious etiology.   ·	No fevers  ·	C diff negative and GI PCR negative.   ·	HIV negative.   ·	CMV PCR negative.   ·	Blood cultures currently no growth.    Anemia likely multifactorial, subacute on chronic  Anemia due to blood loss from bloody diarrhea  Anemia due to chronic disease from inflammatory state.    Marked elevated ESR 92. CRP 83  Iron deficiency anemia      Likely somewhat suboptimal iron stores, not quite iron deficient but at risk for developing iron deficiency       B12 adequate >2000.  Haptoglobin 378 normal.  LDH 80.  Total bilirubin 0.4.    Decreased protein stores.  Albumin 1.2.  As low as 1.1  Total protein decreased 5.1    RECOMMENDATIONS    Colitis & Diarrhea  Workup and management per gastroenterology    Leukocytosis & Bandemia  Infectious diseases following.  continue Zosyn pending endoscopy, plan to discontinue after procedure  EGD and colonoscopy planned today.  follow blood cultures to completion  monitor WBC    Anemia  Start folic acid supplementation  Start p.o. FeSO4 Monday Wednesday Friday or daily, as tolerated.   IV could be considered if no infection, at later date.     Transfuse PRBC as clinically indicated.   Transfuse PRBC if Hgb <7.0 or if symptomatic.   Follow CBC    Follow inflammatory markers.    DVT Prophylaxis  OOB as sabas  SQ Lovenox or SQ heparin    Hypoalbuminemia  Nutrition to evaluate.  Management per gastroenterology      Discussed plan of care with patient and or family in detail.   Pt/Family expressed understanding of the treatment plan.   Risks, benefits and alternatives discussed in detail.   Opportunity given for questions and discussion.   Questions or concerns all addressed and answered to their satisfaction, and in lay terms.     Discussed with Dr Alcantar.    Thank you for consulting us.   No additional recommendations at current time.   Will sign off on case for now.   Please call, or re-consult if needed.     > 65minutes spent in direct patient care, examining and counseling patient,  reviewing  the notes, lab data/ imaging , discussion with multidisciplinary team.

## 2024-04-08 NOTE — CONSULT NOTE ADULT - SUBJECTIVE AND OBJECTIVE BOX
INCOMPLETE NOTE.  Documentation in Progress  PT SEEN AND EVALUATED.   FULL/ADDITIONAL RECOMMENDATIONS TO FOLLOW   ***************************************************************    Patient is a 38y old  Male who presents with a chief complaint of diarrhea (2024 10:42)      HPI:  This is a 38 M with PMH of anxiety hydrocephalus who came c/o diarrhea x 2 months. Patient was diagnosed with Norovirus in early March which he recovered from. Patient is having 10-12 episodes of watery diarrhea with specks of blood. Today he was scheduled for an outpatient colonoscopy. He was found to have SBP 70/40 prior to colonoscopy. Patient feels weak and dizzy. He's had a 30lb weight loss in the past month. He denies fevers, n/v/abdominal pain. (2024 15:49)      PAST MEDICAL & SURGICAL HISTORY:  Anxiety and depression      Hydrocephalus         HEALTH ISSUES - PROBLEM Dx:  Diarrhea    Anxiety and depression    Sepsis, unspecified organism    Severe protein-calorie malnutrition    Hydrocephalus    Anemia          Dehydration [E86.0]    Anxiety and depression [F41.9]    Hydrocephalus [G91.9]    No significant past surgical history [[id number]]      FAMILY HISTORY:      [SOCIAL HISTORY: ]     smoking:  none currently     EtOH:  none currently     illicit drugs:  none currently     occupation:  Retired     marital status:       Other:       [ALLERGIES/INTOLERANCES:]  Allergies    No Known Allergies    Intolerances        [MEDICATIONS]  MEDICATIONS  (STANDING):  bisacodyl 10 milliGRAM(s) Oral <User Schedule>  influenza   Vaccine 0.5 milliLiter(s) IntraMuscular once  lactobacillus acidophilus 1 Tablet(s) Oral three times a day with meals  mesalamine ER Capsule 1000 milliGRAM(s) Oral four times a day  methylPREDNISolone sodium succinate Injectable 20 milliGRAM(s) IV Push three times a day  piperacillin/tazobactam IVPB.. 3.375 Gram(s) IV Intermittent every 8 hours  sodium chloride 0.9%. 1000 milliLiter(s) (125 mL/Hr) IV Continuous <Continuous>    MEDICATIONS  (PRN):  acetaminophen     Tablet .. 650 milliGRAM(s) Oral every 6 hours PRN Temp greater or equal to 38C (100.4F), Mild Pain (1 - 3)  aluminum hydroxide/magnesium hydroxide/simethicone Suspension 30 milliLiter(s) Oral every 4 hours PRN Dyspepsia  melatonin 3 milliGRAM(s) Oral at bedtime PRN Insomnia  ondansetron Injectable 4 milliGRAM(s) IV Push every 6 hours PRN Nausea and/or Vomiting      [REVIEW OF SYSTEMS: ]  CONSTITUTIONAL: normal, no fever, no shakes, no chills   EYES: No eye pain, no visual disturbances, no discharge  ENMT:  no discharge  NECK: No pain, no stiffness  BREASTS: No pain, no masses, no nipple discharge  RESPIRATORY: No cough, no wheezing, no chills, no hemoptysis; No shortness of breath  CARDIOVASCULAR: No chest pain, no palpitations, no dizziness, no leg swelling  GASTROINTESTINAL: No abdominal, no epigastric pain. No nausea, no vomiting, no hematemesis; No diarrhea , no constipation. No melena, no hematochezia.  GENITOURINARY: No dysuria, no frequency, no hematuria, no incontinence  NEUROLOGICAL: No headaches, no memory loss, no loss of strength, no numbness, no tremors  SKIN: No itching, no burning, no rashes, no lesions   LYMPH NODES: No enlarged glands  ENDOCRINE: No heat or cold intolerance; No hair loss  MUSCULOSKELETAL: No joint pain or swelling; No muscle, no back, no extremity pain  PSYCHIATRIC: No depression, no anxiety, no mood swings, no difficulty sleeping  HEME/LYMPH: No easy bruising, no bleeding gums    [VITALS SIGNS 24hrs]  Vital Signs Last 24 Hrs  T(C): 36.7 (2024 16:33), Max: 36.7 (2024 19:57)  T(F): 98 (2024 16:33), Max: 98 (2024 19:57)  HR: 84 (2024 16:33) (78 - 97)  BP: 119/78 (2024 16:33) (104/68 - 126/69)  BP(mean): 77 (2024 11:40) (69 - 77)  RR: 18 (2024 16:33) (17 - 18)  SpO2: 94% (2024 16:33) (94% - 100%)    Parameters below as of 2024 16:33  Patient On (Oxygen Delivery Method): room air      Daily     Daily Weight in k.1 (2024 04:44)    I&O's Summary    2024 07:01  -  2024 07:00  --------------------------------------------------------  IN: 1700 mL / OUT: 0 mL / NET: 1700 mL    2024 07:01  -  2024 16:35  --------------------------------------------------------  IN: 0 mL / OUT: 300 mL / NET: -300 mL        [PHYSICAL EXAM]  GEN:   HEENT: normocephalic and atraumatic. EOMI. PERRL.    NECK: Supple.  No lymphadenopathy   LUNGS: Clear to auscultation.  HEART: S1S2 Regular rate and rhythm, no MRG  ABDOMEN: Soft, nontender, and nondistended.  Positive bowel sounds.    : No CVA tenderness  EXTREMITIES: Without edema.  NEUROLOGIC: grossly intact.  PSYCHIATRIC: Appropriate affect .  SKIN: No rash     [LABS: ]                        8.3    6.58  )-----------( 512      ( 2024 06:10 )             26.1     CBC Full  -  ( 2024 06:10 )  WBC Count : 6.58 K/uL  RBC Count : 2.97 M/uL  Hemoglobin : 8.3 g/dL  Hematocrit : 26.1 %  Platelet Count - Automated : 512 K/uL  Mean Cell Volume : 87.9 fl  Mean Cell Hemoglobin : 27.9 pg  Mean Cell Hemoglobin Concentration : 31.8 gm/dL  Auto Neutrophil # : 3.95 K/uL  Auto Lymphocyte # : 1.47 K/uL  Auto Monocyte # : 0.67 K/uL  Auto Eosinophil # : 0.35 K/uL  Auto Basophil # : 0.04 K/uL  Auto Neutrophil % : 60.1 %  Auto Lymphocyte % : 22.3 %  Auto Monocyte % : 10.2 %  Auto Eosinophil % : 5.3 %  Auto Basophil % : 0.6 %        143  |  109<H>  |  4<L>  ----------------------------<  93  3.4<L>   |  29  |  0.59    Ca    8.1<L>      2024 06:10  Mg     1.9         TPro  5.1<L>  /  Alb  1.2<L>  /  TBili  0.4  /  DBili  0.2  /  AST  11<L>  /  ALT  13  /  AlkPhos  97        LIVER FUNCTIONS - ( 2024 06:10 )  Alb: 1.2 g/dL / Pro: 5.1 g/dL / ALK PHOS: 97 U/L / ALT: 13 U/L / AST: 11 U/L / GGT: x               Urinalysis Basic - ( 2024 06:10 )    Color: x / Appearance: x / SG: x / pH: x  Gluc: 93 mg/dL / Ketone: x  / Bili: x / Urobili: x   Blood: x / Protein: x / Nitrite: x   Leuk Esterase: x / RBC: x / WBC x   Sq Epi: x / Non Sq Epi: x / Bacteria: x          CBC TREND (5 Days)  WBC Count: 6.58 K/uL ( @ 06:10)  WBC Count: 9.04 K/uL ( @ 07:26)  WBC Count: 11.68 K/uL ( @ 06:50)    Hemoglobin: 8.3 g/dL ( @ 06:10)  Hemoglobin: 8.3 g/dL ( @ 07:26)  Hemoglobin: 9.4 g/dL ( @ 06:50)    Hematocrit: 26.1 % ( @ 06:10)  Hematocrit: 26.4 % ( @ 07:26)  Hematocrit: 30.1 % ( @ 06:50)    Platelet Count - Automated: 512 K/uL ( @ 06:10)  Platelet Count - Automated: 515 K/uL ( @ 07:26)  Platelet Count - Automated: 641 K/uL ( @ 06:50)    Reticulocyte Percent: 3.3 % ( @ 06:10)      Ferritin: 299 ng/mL ( @ 06:10)    Iron Total: 29 ug/dL ( @ 06:10)     Vitamin B12, Serum: >2000 pg/mL ( @ 06:10)     Folate, Serum: 7.0 ng/mL ( @ 06:10)     Sedimentation Rate, Erythrocyte: 92 mm/hr ( @ 16:40)  Sedimentation Rate, Erythrocyte: 44 mm/Hr ( @ 06:00)                           [MICROBIOLOGY /  VIROLOGY:]  COVID-19 PCR: NotDetec (2024 16:39)        Culture - Blood (collected 2024 20:11)  Source: .Blood Blood  Preliminary Report (2024 02:03):    No growth at 48 Hours    Culture - Blood (collected 2024 19:10)  Source: .Blood Blood  Preliminary Report (2024 02:03):    No growth at 48 Hours        [PATHOLOGY]       [RADIOLOGY & ADDITIONAL STUDIES:]        Patient is a 38y old  Male who presents with a chief complaint of diarrhea (2024 10:42)    HPI:  This is a 38 M with PMH of anxiety hydrocephalus who came c/o diarrhea x 2 months. Patient was diagnosed with Norovirus in early March which he recovered from. Patient is having 10-12 episodes of watery diarrhea with specks of blood. Today he was scheduled for an outpatient colonoscopy. He was found to have SBP 70/40 prior to colonoscopy. Patient feels weak and dizzy. He's had a 30lb weight loss in the past month. He denies fevers, n/v/abdominal pain. (2024 15:49)      PAST MEDICAL & SURGICAL HISTORY:  Anxiety and depression  Hydrocephalus     HEALTH ISSUES - PROBLEM Dx:  Diarrhea  Anxiety and depression  Sepsis, unspecified organism  Severe protein-calorie malnutrition  Hydrocephalus  Anemia    Dehydration [E86.0]  Anxiety and depression [F41.9]  Hydrocephalus [G91.9]  No significant past surgical history [[id number]]      FAMILY HISTORY:      [SOCIAL HISTORY: ]     smoking:  none currently. smokes E-cigarettes     EtOH:  none currently     illicit drugs:  none currently     occupation:   [employer]     marital status:  single, no children. Lives with biological father     Other:       [ALLERGIES/INTOLERANCES:]  Allergies     No Known Allergies  Intolerances      [MEDICATIONS]  MEDICATIONS  (STANDING):  bisacodyl 10 milliGRAM(s) Oral <User Schedule>  influenza   Vaccine 0.5 milliLiter(s) IntraMuscular once  lactobacillus acidophilus 1 Tablet(s) Oral three times a day with meals  mesalamine ER Capsule 1000 milliGRAM(s) Oral four times a day  methylPREDNISolone sodium succinate Injectable 20 milliGRAM(s) IV Push three times a day  piperacillin/tazobactam IVPB.. 3.375 Gram(s) IV Intermittent every 8 hours  sodium chloride 0.9%. 1000 milliLiter(s) (125 mL/Hr) IV Continuous <Continuous>      MEDICATIONS  (PRN):  acetaminophen     Tablet .. 650 milliGRAM(s) Oral every 6 hours PRN Temp greater or equal to 38C (100.4F), Mild Pain (1 - 3)  aluminum hydroxide/magnesium hydroxide/simethicone Suspension 30 milliLiter(s) Oral every 4 hours PRN Dyspepsia  melatonin 3 milliGRAM(s) Oral at bedtime PRN Insomnia  ondansetron Injectable 4 milliGRAM(s) IV Push every 6 hours PRN Nausea and/or Vomiting      [REVIEW OF SYSTEMS: ]  CONSTITUTIONAL: +weakness, no fever, no shakes, no chills   EYES: No eye pain, no visual disturbances, no discharge  ENMT:  no discharge  NECK: No pain, no stiffness  BREASTS: No pain, no masses, no nipple discharge  RESPIRATORY: No cough, no wheezing, no chills, no hemoptysis; No shortness of breath  CARDIOVASCULAR: No chest pain, no palpitations, no dizziness, no leg swelling  GASTROINTESTINAL: No abdominal, no epigastric pain. No nausea, no vomiting, no hematemesis; +diarrhea , no constipation. No melena, no hematochezia.  GENITOURINARY: No dysuria, no frequency, no hematuria, no incontinence  NEUROLOGICAL: No headaches, no memory loss, no loss of strength, no numbness, no tremors  SKIN: No itching, no burning, no rashes, no lesions   LYMPH NODES: No enlarged glands  ENDOCRINE: No heat or cold intolerance; No hair loss  MUSCULOSKELETAL: No joint pain or swelling; No muscle, no back, no extremity pain  PSYCHIATRIC: No depression, no anxiety, no mood swings, no difficulty sleeping  HEME/LYMPH: No easy bruising, no bleeding gums    [VITALS SIGNS 24hrs]  Vital Signs Last 24 Hrs  T(C): 36.7 (2024 16:33), Max: 36.7 (2024 19:57)  T(F): 98 (2024 16:33), Max: 98 (2024 19:57)  HR: 84 (2024 16:33) (78 - 97)  BP: 119/78 (2024 16:33) (104/68 - 126/69)  BP(mean): 77 (2024 11:40) (69 - 77)  RR: 18 (2024 16:33) (17 - 18)  SpO2: 94% (2024 16:33) (94% - 100%)    Parameters below as of 2024 16:33  Patient On (Oxygen Delivery Method): room air      Daily     Daily Weight in k.1 (2024 04:44)    I&O's Summary  2024 07:01  -  2024 07:00  --------------------------------------------------------  IN: 1700 mL / OUT: 0 mL / NET: 1700 mL  2024 07:01  -  2024 16:35  --------------------------------------------------------  IN: 0 mL / OUT: 300 mL / NET: -300 mL      [PHYSICAL EXAM]  GEN: WD some cachexia, pale  HEENT: normocephalic and atraumatic. EOMI. PERRL.    NECK: Supple.  No lymphadenopathy   LUNGS: Clear to auscultation.  HEART: S1S2 Regular rate and rhythm, no MRG  ABDOMEN: Soft, nontender, and nondistended.  Positive bowel sounds.    : No CVA tenderness  EXTREMITIES: Without edema.  NEUROLOGIC: grossly intact.  PSYCHIATRIC: Appropriate affect .  SKIN: No rash     [LABS: ]                        8.3    6.58  )-----------( 512      ( 2024 06:10 )             26.1     CBC Full  -  ( 2024 06:10 )  WBC Count : 6.58 K/uL  RBC Count : 2.97 M/uL  Hemoglobin : 8.3 g/dL  Hematocrit : 26.1 %  Platelet Count - Automated : 512 K/uL  Mean Cell Volume : 87.9 fl  Mean Cell Hemoglobin : 27.9 pg  Mean Cell Hemoglobin Concentration : 31.8 gm/dL  Auto Neutrophil # : 3.95 K/uL  Auto Lymphocyte # : 1.47 K/uL  Auto Monocyte # : 0.67 K/uL  Auto Eosinophil # : 0.35 K/uL  Auto Basophil # : 0.04 K/uL  Auto Neutrophil % : 60.1 %  Auto Lymphocyte % : 22.3 %  Auto Monocyte % : 10.2 %  Auto Eosinophil % : 5.3 %  Auto Basophil % : 0.6 %    08    143  |  109<H>  |  4<L>  ----------------------------<  93  3.4<L>   |  29  |  0.59    Ca    8.1<L>      2024 06:10  Mg     1.9     -    TPro  5.1<L>  /  Alb  1.2<L>  /  TBili  0.4  /  DBili  0.2  /  AST  11<L>  /  ALT  13  /  AlkPhos  97  04-  LIVER FUNCTIONS - ( 2024 06:10 )  Alb: 1.2 g/dL / Pro: 5.1 g/dL / ALK PHOS: 97 U/L / ALT: 13 U/L / AST: 11 U/L / GGT: x             Urinalysis Basic - ( 2024 06:10 )  Color: x / Appearance: x / SG: x / pH: x  Gluc: 93 mg/dL / Ketone: x  / Bili: x / Urobili: x   Blood: x / Protein: x / Nitrite: x   Leuk Esterase: x / RBC: x / WBC x   Sq Epi: x / Non Sq Epi: x / Bacteria: x          CBC TREND (5 Days)  WBC Count: 6.58 K/uL ( @ 06:10)  WBC Count: 9.04 K/uL ( @ 07:26)  WBC Count: 11.68 K/uL ( @ 06:50)    Hemoglobin: 8.3 g/dL ( @ 06:10)  Hemoglobin: 8.3 g/dL ( @ 07:26)  Hemoglobin: 9.4 g/dL ( @ 06:50)    Hematocrit: 26.1 % ( @ 06:10)  Hematocrit: 26.4 % ( @ 07:26)  Hematocrit: 30.1 % ( @ 06:50)    Platelet Count - Automated: 512 K/uL ( @ 06:10)  Platelet Count - Automated: 515 K/uL ( @ 07:26)  Platelet Count - Automated: 641 K/uL ( @ 06:50)    Reticulocyte Percent: 3.3 % ( @ 06:10)      Ferritin: 299 ng/mL ( @ 06:10)    Iron Total: 29 ug/dL ( @ 06:10)     Vitamin B12, Serum: >2000 pg/mL ( @ 06:10)     Folate, Serum: 7.0 ng/mL ( @ 06:10)     Sedimentation Rate, Erythrocyte: 92 mm/hr ( @ 16:40)  Sedimentation Rate, Erythrocyte: 44 mm/Hr ( @ 06:00)      [MICROBIOLOGY /  VIROLOGY:]  COVID-19 PCR: NotDetec (2024 16:39)    Culture - Blood (collected 2024 20:11)  Source: .Blood Blood  Preliminary Report (2024 02:03):    No growth at 48 Hours    Culture - Blood (collected 2024 19:10)  Source: .Blood Blood  Preliminary Report (2024 02:03):    No growth at 48 Hours        [PATHOLOGY]       [RADIOLOGY & ADDITIONAL STUDIES:]

## 2024-04-08 NOTE — DIETITIAN INITIAL EVALUATION ADULT - PROBLEM SELECTOR PLAN 2
Unclear etiology  Check GI PCR stool, stool for C diff  Diet as tolerated  Probiotics  HIV testing  Will need EGD/colonoscopy next week to r/o IBD  GI consult  Further work-up/management pending clinical course.

## 2024-04-08 NOTE — DIETITIAN INITIAL EVALUATION ADULT - ORAL INTAKE PTA/DIET HISTORY
patient seen NPO for endoscopy/colonoscopy today. patient reports has appetite but since early March with diarrhea so when working has stopped eating 2/2 concern of needing bathroom. no food allergies. no difficulties chewing swallowing   previous dx malnourished admit in early March  education deferred until further work up with EGD Colonoscopy today

## 2024-04-08 NOTE — DIETITIAN INITIAL EVALUATION ADULT - PERTINENT LABORATORY DATA
04-08    143  |  109<H>  |  4<L>  ----------------------------<  93  3.4<L>   |  29  |  0.59    Ca    8.1<L>      08 Apr 2024 06:10  Mg     1.9     04-08    TPro  5.1<L>  /  Alb  1.2<L>  /  TBili  0.4  /  DBili  0.2  /  AST  11<L>  /  ALT  13  /  AlkPhos  97  04-08  A1C with Estimated Average Glucose Result: 5.4 % (03-05-24 @ 06:00)

## 2024-04-08 NOTE — DIETITIAN INITIAL EVALUATION ADULT - OTHER INFO
Reason for Admission: diarrhea  History of Present Illness:   This is a 38 M with PMH of anxiety hydrocephalus who came c/o diarrhea x 2 months. Patient was diagnosed with Norovirus in early March which he recovered from. Patient is having 10-12 episodes of watery diarrhea with specks of blood. Today he was scheduled for an outpatient colonoscopy. He was found to have SBP 70/40 prior to colonoscopy. Patient feels weak and dizzy. He's had a 30lb weight loss in the past month.  weight history per patient # current weight ~ 179#  IVNS 125ml hr

## 2024-04-08 NOTE — PROGRESS NOTE ADULT - PROBLEM SELECTOR PLAN 1
Patient with severe sepsis -- leukocytosis with bandemia, tachycardia, hypotension, and pan-colitis -- resolved  Continue empiric iv Zosyn for now  Trend CBCD -- normal  F/U culture data -- NTD  ID f/u

## 2024-04-08 NOTE — DIETITIAN NUTRITION RISK NOTIFICATION - TREATMENT: THE FOLLOWING DIET HAS BEEN RECOMMENDED
Diet, NPO after Midnight:      NPO Start Date: 07-Apr-2024,   NPO Start Time: 23:59  Except Medications (04-07-24 @ 07:33) [Active]  Diet, Clear Liquid (04-07-24 @ 07:33) [Active]

## 2024-04-08 NOTE — DIETITIAN INITIAL EVALUATION ADULT - ETIOLOGY
related to alteration in GI tract function/structure related to inadequate energy intake in setting of continuing diarrhea

## 2024-04-08 NOTE — DIETITIAN INITIAL EVALUATION ADULT - PROBLEM SELECTOR PLAN 1
Patient with severe sepsis -- leukocytosis with bandemia, tachycardia, hypotension (prior to arrival), and suspected GI source  Admit  Pan-culture  Start empiric iv Zosyn  Trend CBCD  Check lactate  Check CT c/a/p  ID consult

## 2024-04-09 LAB
ANION GAP SERPL CALC-SCNC: 4 MMOL/L — LOW (ref 5–17)
BUN SERPL-MCNC: 3 MG/DL — LOW (ref 7–23)
CALCIUM SERPL-MCNC: 7.4 MG/DL — LOW (ref 8.5–10.1)
CHLORIDE SERPL-SCNC: 110 MMOL/L — HIGH (ref 96–108)
CO2 SERPL-SCNC: 28 MMOL/L — SIGNIFICANT CHANGE UP (ref 22–31)
CREAT SERPL-MCNC: 0.58 MG/DL — SIGNIFICANT CHANGE UP (ref 0.5–1.3)
EGFR: 130 ML/MIN/1.73M2 — SIGNIFICANT CHANGE UP
GLUCOSE SERPL-MCNC: 131 MG/DL — HIGH (ref 70–99)
HCT VFR BLD CALC: 27.4 % — LOW (ref 39–50)
HGB BLD-MCNC: 8.7 G/DL — LOW (ref 13–17)
MAGNESIUM SERPL-MCNC: 1.9 MG/DL — SIGNIFICANT CHANGE UP (ref 1.6–2.6)
MCHC RBC-ENTMCNC: 28.2 PG — SIGNIFICANT CHANGE UP (ref 27–34)
MCHC RBC-ENTMCNC: 31.8 GM/DL — LOW (ref 32–36)
MCV RBC AUTO: 88.7 FL — SIGNIFICANT CHANGE UP (ref 80–100)
NRBC # BLD: 0 /100 WBCS — SIGNIFICANT CHANGE UP (ref 0–0)
PLATELET # BLD AUTO: 560 K/UL — HIGH (ref 150–400)
POTASSIUM SERPL-MCNC: 4.4 MMOL/L — SIGNIFICANT CHANGE UP (ref 3.5–5.3)
POTASSIUM SERPL-SCNC: 4.4 MMOL/L — SIGNIFICANT CHANGE UP (ref 3.5–5.3)
RBC # BLD: 3.09 M/UL — LOW (ref 4.2–5.8)
RBC # FLD: 13.8 % — SIGNIFICANT CHANGE UP (ref 10.3–14.5)
SODIUM SERPL-SCNC: 142 MMOL/L — SIGNIFICANT CHANGE UP (ref 135–145)
WBC # BLD: 6.87 K/UL — SIGNIFICANT CHANGE UP (ref 3.8–10.5)
WBC # FLD AUTO: 6.87 K/UL — SIGNIFICANT CHANGE UP (ref 3.8–10.5)

## 2024-04-09 PROCEDURE — 99232 SBSQ HOSP IP/OBS MODERATE 35: CPT

## 2024-04-09 RX ORDER — FOLIC ACID 0.8 MG
1 TABLET ORAL DAILY
Refills: 0 | Status: DISCONTINUED | OUTPATIENT
Start: 2024-04-09 | End: 2024-04-10

## 2024-04-09 RX ORDER — FERROUS SULFATE 325(65) MG
325 TABLET ORAL DAILY
Refills: 0 | Status: DISCONTINUED | OUTPATIENT
Start: 2024-04-09 | End: 2024-04-10

## 2024-04-09 RX ADMIN — SODIUM CHLORIDE 125 MILLILITER(S): 9 INJECTION INTRAMUSCULAR; INTRAVENOUS; SUBCUTANEOUS at 01:34

## 2024-04-09 RX ADMIN — Medication 1 TABLET(S): at 17:13

## 2024-04-09 RX ADMIN — Medication 1000 MILLIGRAM(S): at 00:38

## 2024-04-09 RX ADMIN — Medication 20 MILLIGRAM(S): at 06:25

## 2024-04-09 RX ADMIN — Medication 20 MILLIGRAM(S): at 13:19

## 2024-04-09 RX ADMIN — Medication 1 TABLET(S): at 13:19

## 2024-04-09 RX ADMIN — Medication 10 MILLIGRAM(S): at 17:13

## 2024-04-09 RX ADMIN — PIPERACILLIN AND TAZOBACTAM 25 GRAM(S): 4; .5 INJECTION, POWDER, LYOPHILIZED, FOR SOLUTION INTRAVENOUS at 06:25

## 2024-04-09 RX ADMIN — Medication 325 MILLIGRAM(S): at 11:10

## 2024-04-09 RX ADMIN — Medication 1000 MILLIGRAM(S): at 17:13

## 2024-04-09 RX ADMIN — Medication 1000 MILLIGRAM(S): at 06:26

## 2024-04-09 RX ADMIN — SODIUM CHLORIDE 125 MILLILITER(S): 9 INJECTION INTRAMUSCULAR; INTRAVENOUS; SUBCUTANEOUS at 11:11

## 2024-04-09 RX ADMIN — Medication 10 MILLIGRAM(S): at 16:08

## 2024-04-09 RX ADMIN — Medication 1 TABLET(S): at 08:55

## 2024-04-09 RX ADMIN — Medication 20 MILLIGRAM(S): at 21:51

## 2024-04-09 RX ADMIN — Medication 1 MILLIGRAM(S): at 11:10

## 2024-04-09 RX ADMIN — Medication 1000 MILLIGRAM(S): at 11:10

## 2024-04-09 NOTE — PROGRESS NOTE ADULT - PROBLEM SELECTOR PLAN 1
Patient with severe sepsis -- leukocytosis with bandemia, tachycardia, hypotension, and pan-colitis -- resolved  S/P iv Zosyn  Trend CBCD -- normal  F/U culture data -- NTD  ID f/u

## 2024-04-10 ENCOUNTER — TRANSCRIPTION ENCOUNTER (OUTPATIENT)
Age: 39
End: 2024-04-10

## 2024-04-10 VITALS
HEART RATE: 106 BPM | SYSTOLIC BLOOD PRESSURE: 128 MMHG | RESPIRATION RATE: 18 BRPM | OXYGEN SATURATION: 96 % | TEMPERATURE: 98 F | DIASTOLIC BLOOD PRESSURE: 71 MMHG

## 2024-04-10 LAB
ANION GAP SERPL CALC-SCNC: 5 MMOL/L — SIGNIFICANT CHANGE UP (ref 5–17)
BUN SERPL-MCNC: 4 MG/DL — LOW (ref 7–23)
CALCIUM SERPL-MCNC: 8.3 MG/DL — LOW (ref 8.5–10.1)
CHLORIDE SERPL-SCNC: 108 MMOL/L — SIGNIFICANT CHANGE UP (ref 96–108)
CO2 SERPL-SCNC: 29 MMOL/L — SIGNIFICANT CHANGE UP (ref 22–31)
CREAT SERPL-MCNC: 0.52 MG/DL — SIGNIFICANT CHANGE UP (ref 0.5–1.3)
EGFR: 134 ML/MIN/1.73M2 — SIGNIFICANT CHANGE UP
GLUCOSE SERPL-MCNC: 132 MG/DL — HIGH (ref 70–99)
HAV IGM SER-ACNC: SIGNIFICANT CHANGE UP
HBV CORE IGM SER-ACNC: SIGNIFICANT CHANGE UP
HBV SURFACE AG SER-ACNC: SIGNIFICANT CHANGE UP
HCT VFR BLD CALC: 28.2 % — LOW (ref 39–50)
HCV AB S/CO SERPL IA: 0.14 S/CO — SIGNIFICANT CHANGE UP (ref 0–0.99)
HCV AB SERPL-IMP: SIGNIFICANT CHANGE UP
HGB BLD-MCNC: 8.8 G/DL — LOW (ref 13–17)
MAGNESIUM SERPL-MCNC: 1.8 MG/DL — SIGNIFICANT CHANGE UP (ref 1.6–2.6)
MCHC RBC-ENTMCNC: 27.4 PG — SIGNIFICANT CHANGE UP (ref 27–34)
MCHC RBC-ENTMCNC: 31.2 GM/DL — LOW (ref 32–36)
MCV RBC AUTO: 87.9 FL — SIGNIFICANT CHANGE UP (ref 80–100)
NRBC # BLD: 0 /100 WBCS — SIGNIFICANT CHANGE UP (ref 0–0)
PLATELET # BLD AUTO: 599 K/UL — HIGH (ref 150–400)
POTASSIUM SERPL-MCNC: 4.1 MMOL/L — SIGNIFICANT CHANGE UP (ref 3.5–5.3)
POTASSIUM SERPL-SCNC: 4.1 MMOL/L — SIGNIFICANT CHANGE UP (ref 3.5–5.3)
RBC # BLD: 3.21 M/UL — LOW (ref 4.2–5.8)
RBC # FLD: 14 % — SIGNIFICANT CHANGE UP (ref 10.3–14.5)
SODIUM SERPL-SCNC: 142 MMOL/L — SIGNIFICANT CHANGE UP (ref 135–145)
WBC # BLD: 8.47 K/UL — SIGNIFICANT CHANGE UP (ref 3.8–10.5)
WBC # FLD AUTO: 8.47 K/UL — SIGNIFICANT CHANGE UP (ref 3.8–10.5)

## 2024-04-10 PROCEDURE — 82728 ASSAY OF FERRITIN: CPT

## 2024-04-10 PROCEDURE — 83010 ASSAY OF HAPTOGLOBIN QUANT: CPT

## 2024-04-10 PROCEDURE — 88305 TISSUE EXAM BY PATHOLOGIST: CPT

## 2024-04-10 PROCEDURE — 87040 BLOOD CULTURE FOR BACTERIA: CPT

## 2024-04-10 PROCEDURE — 83550 IRON BINDING TEST: CPT

## 2024-04-10 PROCEDURE — 80048 BASIC METABOLIC PNL TOTAL CA: CPT

## 2024-04-10 PROCEDURE — 74177 CT ABD & PELVIS W/CONTRAST: CPT | Mod: MC

## 2024-04-10 PROCEDURE — 83605 ASSAY OF LACTIC ACID: CPT

## 2024-04-10 PROCEDURE — 84145 PROCALCITONIN (PCT): CPT

## 2024-04-10 PROCEDURE — 83690 ASSAY OF LIPASE: CPT

## 2024-04-10 PROCEDURE — 86480 TB TEST CELL IMMUN MEASURE: CPT

## 2024-04-10 PROCEDURE — 84436 ASSAY OF TOTAL THYROXINE: CPT

## 2024-04-10 PROCEDURE — 87389 HIV-1 AG W/HIV-1&-2 AB AG IA: CPT

## 2024-04-10 PROCEDURE — 83735 ASSAY OF MAGNESIUM: CPT

## 2024-04-10 PROCEDURE — 82746 ASSAY OF FOLIC ACID SERUM: CPT

## 2024-04-10 PROCEDURE — 86850 RBC ANTIBODY SCREEN: CPT

## 2024-04-10 PROCEDURE — 83540 ASSAY OF IRON: CPT

## 2024-04-10 PROCEDURE — 86703 HIV-1/HIV-2 1 RESULT ANTBDY: CPT

## 2024-04-10 PROCEDURE — 85027 COMPLETE CBC AUTOMATED: CPT

## 2024-04-10 PROCEDURE — 71260 CT THORAX DX C+: CPT | Mod: MC

## 2024-04-10 PROCEDURE — 88313 SPECIAL STAINS GROUP 2: CPT

## 2024-04-10 PROCEDURE — 87507 IADNA-DNA/RNA PROBE TQ 12-25: CPT

## 2024-04-10 PROCEDURE — 82962 GLUCOSE BLOOD TEST: CPT

## 2024-04-10 PROCEDURE — 88312 SPECIAL STAINS GROUP 1: CPT

## 2024-04-10 PROCEDURE — 85652 RBC SED RATE AUTOMATED: CPT

## 2024-04-10 PROCEDURE — 85025 COMPLETE CBC W/AUTO DIFF WBC: CPT

## 2024-04-10 PROCEDURE — 87493 C DIFF AMPLIFIED PROBE: CPT

## 2024-04-10 PROCEDURE — 80053 COMPREHEN METABOLIC PANEL: CPT

## 2024-04-10 PROCEDURE — 99285 EMERGENCY DEPT VISIT HI MDM: CPT

## 2024-04-10 PROCEDURE — 84480 ASSAY TRIIODOTHYRONINE (T3): CPT

## 2024-04-10 PROCEDURE — 80076 HEPATIC FUNCTION PANEL: CPT

## 2024-04-10 PROCEDURE — 86140 C-REACTIVE PROTEIN: CPT

## 2024-04-10 PROCEDURE — 82607 VITAMIN B-12: CPT

## 2024-04-10 PROCEDURE — 83615 LACTATE (LD) (LDH) ENZYME: CPT

## 2024-04-10 PROCEDURE — 86900 BLOOD TYPING SEROLOGIC ABO: CPT

## 2024-04-10 PROCEDURE — 85045 AUTOMATED RETICULOCYTE COUNT: CPT

## 2024-04-10 PROCEDURE — 86901 BLOOD TYPING SEROLOGIC RH(D): CPT

## 2024-04-10 PROCEDURE — 80074 ACUTE HEPATITIS PANEL: CPT

## 2024-04-10 PROCEDURE — 84443 ASSAY THYROID STIM HORMONE: CPT

## 2024-04-10 PROCEDURE — 36415 COLL VENOUS BLD VENIPUNCTURE: CPT

## 2024-04-10 PROCEDURE — 84134 ASSAY OF PREALBUMIN: CPT

## 2024-04-10 RX ORDER — LACTOBACILLUS ACIDOPHILUS 100MM CELL
1 CAPSULE ORAL
Qty: 60 | Refills: 0
Start: 2024-04-10 | End: 2024-05-09

## 2024-04-10 RX ORDER — SERTRALINE 25 MG/1
1 TABLET, FILM COATED ORAL
Qty: 0 | Refills: 0 | DISCHARGE

## 2024-04-10 RX ORDER — FERROUS SULFATE 325(65) MG
1 TABLET ORAL
Qty: 30 | Refills: 0
Start: 2024-04-10 | End: 2024-05-09

## 2024-04-10 RX ORDER — MESALAMINE 400 MG
2 TABLET, DELAYED RELEASE (ENTERIC COATED) ORAL
Qty: 240 | Refills: 0
Start: 2024-04-10 | End: 2024-05-09

## 2024-04-10 RX ADMIN — Medication 1 MILLIGRAM(S): at 11:20

## 2024-04-10 RX ADMIN — Medication 1 TABLET(S): at 08:20

## 2024-04-10 RX ADMIN — Medication 1000 MILLIGRAM(S): at 11:20

## 2024-04-10 RX ADMIN — Medication 325 MILLIGRAM(S): at 11:20

## 2024-04-10 RX ADMIN — Medication 20 MILLIGRAM(S): at 06:16

## 2024-04-10 RX ADMIN — Medication 1000 MILLIGRAM(S): at 00:02

## 2024-04-10 RX ADMIN — Medication 1000 MILLIGRAM(S): at 06:17

## 2024-04-10 NOTE — PROGRESS NOTE ADULT - ASSESSMENT
38 M with PMH of anxiety hydrocephalus, who came c/o diarrhea x 2 months. Patient was diagnosed with Norovirus and etec in early March. Patient reported still having diarrhea, and also with weight loss.    Initially found to have leukocytosis with bandemia, likely 2/2 pancolitis. S/p endoscopy on 4/8, path pending. Less likely infectious etiology, plan to start steroids. No fevers and no leukocytosis, bandemia resolved. C diff negative and GI PCR negative. HIV negative. CMV PCR negative. Blood cultures remain no growth.    #Colitis  #Leukocytosis  #Bandemia  #Diarrhea    -discontinue Zosyn  -follow blood cultures to completion  -discussed with Dr. Macdonald  -will follow PRN, please call ID if any questions or acute issues arise. Thank you.    Shavonne Dillon MD  Division of Infectious Diseases   Cell [phone] between 8am and 6pm   After 6pm and weekends please call ID service at [phone].     35 minutes spent on total encounter assessing patient, examination, chart review, counseling and coordinating care by the attending physician/nurse/care manager.     
Diarrhea  Hypotension  Weight loss    s/p  upper gastrointestinal endoscopy bx duodenal ulcer and incomplete colonoscopy with biopsy findings are severe pancolitis  ppi bid  adv to regular diet  gerd  precautions  mesalamine  switch steroids to prednisone 40mg daily, taper down by 10mg every week (1 week 40mg qd, 1 week 30mg qd, etc)  will need close outpatient follow up to follow pathology and manage taper  dw pt and family    I reviewed the overnight course of events on the unit, re-confirming the patient history. I discussed the care with the patient  Differential diagnosis and plan of care discussed with patient after the evaluation  35 minutes spent on total encounter of which more than fifty percent of the encounter was spent counseling and/or coordinating care by the attending physician.  
Diarrhea  Hypotension  Weight loss    IVF hydration  Follow up labs  id on the case  ? ibd from labs  Follow up HIV testing negative  Will need admission for optimization for inpatient egd/colon on Monday  Ok to have diet but switch to clear liquids on Sunday  prep in am  D/w primary team  D/w pt and family  Will follow    I reviewed the overnight course of events on the unit, re-confirming the patient history. I discussed the care with the patient  Differential diagnosis and plan of care discussed with patient after the evaluation  35 minutes spent on total encounter of which more than fifty percent of the encounter was spent counseling and/or coordinating care by the attending physician.  
38 M with PMH of anxiety hydrocephalus, who came c/o diarrhea x 2 months. Patient was diagnosed with Norovirus and etec in early March. Patient reported still having diarrhea, and also with weight loss.    Initially found to have leukocytosis with bandemia, likely 2/2 pancolitis. Less likely infectious etiology. No fevers and report feeling better today. C diff negative and GI PCR negative. HIV negative. CMV PCR negative. Blood cultures currently no growth.    #Colitis  #Leukocytosis  #Bandemia  #Diarrhea    -continue Zosyn pending endoscopy, plan to discontinue after procedure  -follow blood cultures to completion  -monitor WBC  -discussed with Dr. Torres Dillon MD  Division of Infectious Diseases   Cell [phone] between 8am and 6pm   After 6pm and weekends please call ID service at [phone].     35 minutes spent on total encounter assessing patient, examination, chart review, counseling and coordinating care by the attending physician/nurse/care manager.     
Diarrhea  Hypotension  Weight loss    IVF hydration  Follow up labs  HIV testing-negative   GI PCR- negative   Plan on  egd/colon tomorrow  Clears today, NPO at midnight   Bowel prep this evening   D/w primary team  D/w pt and family  Will follow    I reviewed the overnight course of events on the unit, re-confirming the patient history. I discussed the care with the patient  Differential diagnosis and plan of care discussed with patient after the evaluation  35 minutes spent on total encounter of which more than fifty percent of the encounter was spent counseling and/or coordinating care by the attending physician.  
Diarrhea  Hypotension  Weight loss    s/p  upper gastrointestinal endoscopy bx duodenal ulcer and incomplete colonoscopy with biopsy findings are severe pancolitis  ppi bid  adv to regular diet  gerd  precautions  iv steroids  mesalamine  f/u bx  dw pt    I reviewed the overnight course of events on the unit, re-confirming the patient history. I discussed the care with the patient  Differential diagnosis and plan of care discussed with patient after the evaluation  35 minutes spent on total encounter of which more than fifty percent of the encounter was spent counseling and/or coordinating care by the attending physician.  
Never smoker

## 2024-04-10 NOTE — DISCHARGE NOTE PROVIDER - PROVIDER TOKENS
PROVIDER:[TOKEN:[[id number]],FOLLOWUP:[1 week],ESTABLISHEDPATIENT:[T]],PROVIDER:[TOKEN:[[id number]],FOLLOWUP:[1 week],ESTABLISHEDPATIENT:[T]]

## 2024-04-10 NOTE — PROGRESS NOTE ADULT - SUBJECTIVE AND OBJECTIVE BOX
s/p  upper gastrointestinal endoscopy bx duodenal ulcer and incomplete colonoscopy with biopsy findings are severwe pancolitis  plan  ppi bid  adv to regular diet  gerd  precautions  iv steroids to be started   f/u bx
[facility]  Herson Calabrese PA-C  [address] [place] [address]  [phone]      INTERVAL HPI/OVERNIGHT EVENTS:  Pt seen and examined   Reports 3 loose BM overnight   Tolerating diet       MEDICATIONS  (STANDING):  bisacodyl 10 milliGRAM(s) Oral <User Schedule>  influenza   Vaccine 0.5 milliLiter(s) IntraMuscular once  lactobacillus acidophilus 1 Tablet(s) Oral three times a day with meals  piperacillin/tazobactam IVPB.. 3.375 Gram(s) IV Intermittent every 8 hours  polyethylene glycol/electrolyte Solution 1000 milliLiter(s) Oral every 4 hours  sodium chloride 0.9%. 1000 milliLiter(s) (125 mL/Hr) IV Continuous <Continuous>    MEDICATIONS  (PRN):  acetaminophen     Tablet .. 650 milliGRAM(s) Oral every 6 hours PRN Temp greater or equal to 38C (100.4F), Mild Pain (1 - 3)  aluminum hydroxide/magnesium hydroxide/simethicone Suspension 30 milliLiter(s) Oral every 4 hours PRN Dyspepsia  melatonin 3 milliGRAM(s) Oral at bedtime PRN Insomnia  ondansetron Injectable 4 milliGRAM(s) IV Push every 6 hours PRN Nausea and/or Vomiting      Allergies  No Known Allergies    Intolerances    REVIEW OF SYSTEMS:  CONSTITUTIONAL: No fever or chills, +fatigue   HEENT:  No headache, no sore throat  RESPIRATORY: No cough, wheezing, or shortness of breath  CARDIOVASCULAR: No chest pain, palpitations, or leg swelling  GASTROINTESTINAL: No abd pain, nausea, vomiting, + diarrhea  GENITOURINARY: No dysuria, frequency, or hematuria  NEUROLOGICAL: no focal weakness or dizziness  MUSCULOSKELETAL: no myalgias       Vital Signs Last 24 Hrs  T(C): 36.8 (07 Apr 2024 05:03), Max: 37.1 (06 Apr 2024 11:45)  T(F): 98.2 (07 Apr 2024 05:03), Max: 98.8 (06 Apr 2024 11:45)  HR: 90 (07 Apr 2024 05:03) (90 - 106)  BP: 114/72 (07 Apr 2024 05:03) (114/72 - 116/77)  BP(mean): --  RR: 18 (07 Apr 2024 05:03) (18 - 18)  SpO2: 96% (07 Apr 2024 05:03) (96% - 97%)    Parameters below as of 07 Apr 2024 05:03  Patient On (Oxygen Delivery Method): room air        04-06-24 @ 07:01  -  04-07-24 @ 07:00  --------------------------------------------------------  IN: 1800 mL / OUT: 1 mL / NET: 1799 mL        GENERAL:  Appears stated age  HEENT:  NC/AT  CHEST:  Full & symmetric excursion  HEART:  Regular rhythm  ABDOMEN:  Soft, non-tender, non-distended  EXTEREMITIES:  no cyanosis  SKIN:  No rash, Pale   NEURO:  Alert      LABS:                        8.3    9.04  )-----------( 515      ( 07 Apr 2024 07:26 )             26.4     04-07    140  |  106  |  6<L>  ----------------------------<  101<H>  3.5   |  26  |  0.59    Ca    8.0<L>      07 Apr 2024 07:26  Mg     1.8     04-07    TPro  5.1<L>  /  Alb  1.1<L>  /  TBili  0.4  /  DBili  x   /  AST  10<L>  /  ALT  11<L>  /  AlkPhos  97  04-07      Culture - Blood (collected 05 Apr 2024 20:11)  Source: .Blood Blood  Preliminary Report (07 Apr 2024 02:02):    No growth at 24 hours    Culture - Blood (collected 05 Apr 2024 19:10)  Source: .Blood Blood  Preliminary Report (07 Apr 2024 02:02):    No growth at 24 hours        RADIOLOGY:     ACC: [id number] EXAM:  CT CHEST IC   ORDERED BY: RONA LEIVA     ACC: [id number] EXAM:  CT ABDOMEN AND PELVIS IC   ORDERED BY: RONA LEIVA     PROCEDURE DATE:  04/05/2024          INTERPRETATION:  CLINICAL INFORMATION: Diarrhea. Leukocytosis.    COMPARISON: 3/4/2024    CONTRAST/COMPLICATIONS:  IV Contrast: Omnipaque 350  90 cc administered   10 cc discarded  Oral Contrast: NONE  Complications: None reported at time of study completion    PROCEDURE:  CT of the Chest, Abdomen and Pelvis was performed.  Sagittal and coronal reformats were performed.    FINDINGS:  CHEST:  LUNGS AND LARGE AIRWAYS: Patent central airways. No pulmonary nodules.  PLEURA: No pleural effusion.  VESSELS: Within normal limits.  HEART: Heart size is normal. No pericardial effusion.  MEDIASTINUM AND ARMANDO: No lymphadenopathy.  CHEST WALL AND LOWER NECK: Within normal limits.    ABDOMEN AND PELVIS:  LIVER: Within normal limits.  BILE DUCTS: Normal caliber.  GALLBLADDER: Within normal limits.  SPLEEN: Within normal limits.  PANCREAS: Within normal limits.  ADRENALS: Within normal limits.  KIDNEYS/URETERS: Within normal limits.    BLADDER: Within normal limits.  REPRODUCTIVE ORGANS: Prostate within normal limits.    BOWEL: No bowel obstruction. Wall thickening of the entire colon   extending from the cecum to the rectum, slightly improved compared to   prior examination 2 mm density along the course of the SMV extending   cephalad from the rectosigmoid junction, possibly reflecting   thrombophlebitis with surrounding inflammatory change.  PERITONEUM: No ascites. No pneumoperitoneum or drainable fluid collection.  VESSELS: Within normal limits.  RETROPERITONEUM/LYMPH NODES: No lymphadenopathy.  ABDOMINAL WALL: Within normal limits.  BONES: Within normal limits.    IMPRESSION:  Pancolitis, slightly decrease as compared to prior examination 3/4/2024.    Tubular density along the course of a branch of the IMV, possibly   reflecting thrombophlebitis.        --- End of Report ---  
Date of Service: 04-09-24 @ 12:02    Patient is a 38y old  Male who presents with a chief complaint of diarrhea (08 Apr 2024 16:50)      INTERVAL HPI/OVERNIGHT EVENTS:  Patient seen and examined. NAD. No complaints.    Vital Signs Last 24 Hrs  T(C): 36.7 (09 Apr 2024 05:06), Max: 36.7 (08 Apr 2024 16:33)  T(F): 98 (09 Apr 2024 05:06), Max: 98 (08 Apr 2024 16:33)  HR: 71 (09 Apr 2024 05:06) (71 - 100)  BP: 113/77 (09 Apr 2024 05:06) (113/77 - 132/66)  BP(mean): --  RR: 20 (09 Apr 2024 05:06) (17 - 20)  SpO2: 95% (09 Apr 2024 05:06) (94% - 100%)    Parameters below as of 09 Apr 2024 05:06  Patient On (Oxygen Delivery Method): room air        04-09    142  |  110<H>  |  3<L>  ----------------------------<  131<H>  4.4   |  28  |  0.58    Ca    7.4<L>      09 Apr 2024 06:15  Mg     1.9     04-09    TPro  5.1<L>  /  Alb  1.2<L>  /  TBili  0.4  /  DBili  0.2  /  AST  11<L>  /  ALT  13  /  AlkPhos  97  04-08                          8.7    6.87  )-----------( 560      ( 09 Apr 2024 06:15 )             27.4       CAPILLARY BLOOD GLUCOSE        Urinalysis Basic - ( 09 Apr 2024 06:15 )    Color: x / Appearance: x / SG: x / pH: x  Gluc: 131 mg/dL / Ketone: x  / Bili: x / Urobili: x   Blood: x / Protein: x / Nitrite: x   Leuk Esterase: x / RBC: x / WBC x   Sq Epi: x / Non Sq Epi: x / Bacteria: x              acetaminophen     Tablet .. 650 milliGRAM(s) Oral every 6 hours PRN  aluminum hydroxide/magnesium hydroxide/simethicone Suspension 30 milliLiter(s) Oral every 4 hours PRN  bisacodyl 10 milliGRAM(s) Oral <User Schedule>  ferrous    sulfate 325 milliGRAM(s) Oral daily  folic acid 1 milliGRAM(s) Oral daily  influenza   Vaccine 0.5 milliLiter(s) IntraMuscular once  lactobacillus acidophilus 1 Tablet(s) Oral three times a day with meals  melatonin 3 milliGRAM(s) Oral at bedtime PRN  mesalamine ER Capsule 1000 milliGRAM(s) Oral four times a day  methylPREDNISolone sodium succinate Injectable 20 milliGRAM(s) IV Push three times a day  ondansetron Injectable 4 milliGRAM(s) IV Push every 6 hours PRN              REVIEW OF SYSTEMS:  CONSTITUTIONAL: No fever, no weight loss, or no fatigue  NECK: No pain, no stiffness  RESPIRATORY: No cough, no wheezing, no chills, no hemoptysis, No shortness of breath  CARDIOVASCULAR: No chest pain, no palpitations, no dizziness, no leg swelling  GASTROINTESTINAL: No abdominal pain. No nausea, no vomiting, no hematemesis; No diarrhea, no constipation. No melena, no hematochezia.  GENITOURINARY: No dysuria, no frequency, no hematuria, no incontinence  NEUROLOGICAL: No headaches, no loss of strength, no numbness, no tremors  SKIN: No itching, no burning  MUSCULOSKELETAL: No joint pain, no swelling; No muscle, no back, no extremity pain  PSYCHIATRIC: No depression, no mood swings,   HEME/LYMPH: No easy bruising, no bleeding gums  ALLERY AND IMMUNOLOGIC: No hives       Consultant(s) Notes Reviewed:  [X] YES  [ ] NO    PHYSICAL EXAM:  GENERAL: NAD  HEAD:  Atraumatic, Normocephalic  EYES: EOMI, PERRLA, conjunctiva and sclera clear  ENMT: No tonsillar erythema, exudates, or enlargement; Moist mucous membranes  NECK: Supple, No JVD  NERVOUS SYSTEM:  Awake & alert  CHEST/LUNG: Clear to auscultation bilaterally; No rales, rhonchi, wheezing,  HEART: Regular rate and rhythm  ABDOMEN: Soft, Nontender, Nondistended; Bowel sounds present  EXTREMITIES:  No clubbing, cyanosis, or edema  LYMPH: No lymphadenopathy noted  SKIN: No rashes      Advanced care planning discussed with patient/family [X] YES   [ ] NO    Advanced care planning discussed with patient/family. Patient's health status was discussed. All appropriate changes have been made regarding patient's end-of-life care. Advanced care planning forms reviewed/discussed/completed.  20 minutes spent.   
[facility]  INFECTIOUS DISEASES - [address], [place] [address]  Tel: [phone]     Fax: [phone]  =======================================================    MARGRET GRANDA [id number]    Follow up: No fevers. Seen earlier today. Feels better. Still with loose stools but denies any pain, nausea or vomiting.    Allergies:  No Known Allergies      Antibiotics:  acetaminophen     Tablet .. 650 milliGRAM(s) Oral every 6 hours PRN  aluminum hydroxide/magnesium hydroxide/simethicone Suspension 30 milliLiter(s) Oral every 4 hours PRN  bisacodyl 10 milliGRAM(s) Oral <User Schedule>  influenza   Vaccine 0.5 milliLiter(s) IntraMuscular once  lactobacillus acidophilus 1 Tablet(s) Oral three times a day with meals  melatonin 3 milliGRAM(s) Oral at bedtime PRN  mesalamine ER Capsule 1000 milliGRAM(s) Oral four times a day  methylPREDNISolone sodium succinate Injectable 20 milliGRAM(s) IV Push three times a day  ondansetron Injectable 4 milliGRAM(s) IV Push every 6 hours PRN  piperacillin/tazobactam IVPB.. 3.375 Gram(s) IV Intermittent every 8 hours  sodium chloride 0.9%. 1000 milliLiter(s) IV Continuous <Continuous>       REVIEW OF SYSTEMS:  CONSTITUTIONAL:  No Fevers  HEENT:  No sore throat or runny nose.  CARDIOVASCULAR:  No chest pain or SOB.  RESPIRATORY:  No cough, shortness of breath  GASTROINTESTINAL:  see history  GENITOURINARY:  No dysuria, frequency or urgency  MUSCULOSKELETAL:  no joint aches, no muscle pain  NEUROLOGIC:  No headache or dizziness  PSYCHIATRIC:  No disorder of thought or mood.     Physical Exam:  ICU Vital Signs Last 24 Hrs  T(C): 36.6 (08 Apr 2024 13:23), Max: 36.7 (07 Apr 2024 19:57)  T(F): 97.9 (08 Apr 2024 13:23), Max: 98 (07 Apr 2024 19:57)  HR: 94 (08 Apr 2024 13:23) (85 - 97)  BP: 126/69 (08 Apr 2024 13:23) (104/68 - 126/69)  BP(mean): 77 (08 Apr 2024 11:40) (69 - 77)  ABP: --  ABP(mean): --  RR: 18 (08 Apr 2024 13:23) (18 - 18)  SpO2: 98% (08 Apr 2024 13:23) (95% - 98%)    O2 Parameters below as of 08 Apr 2024 13:23  Patient On (Oxygen Delivery Method): nasal cannula    GEN: NAD  HEENT: normocephalic and atraumatic.   NECK: Supple.   LUNGS: Normal respiratory effort  HEART: Regular rate and rhythm  ABDOMEN: Soft, nontender, and nondistended.    EXTREMITIES: No leg edema.  NEUROLOGIC: grossly intact.  PSYCHIATRIC: Appropriate affect .    Labs:  04-08    143  |  109<H>  |  4<L>  ----------------------------<  93  3.4<L>   |  29  |  0.59    Ca    8.1<L>      08 Apr 2024 06:10  Mg     1.9     04-08    TPro  5.1<L>  /  Alb  1.2<L>  /  TBili  0.4  /  DBili  0.2  /  AST  11<L>  /  ALT  13  /  AlkPhos  97  04-08                          8.3    6.58  )-----------( 512      ( 08 Apr 2024 06:10 )             26.1       Urinalysis Basic - ( 08 Apr 2024 06:10 )    Color: x / Appearance: x / SG: x / pH: x  Gluc: 93 mg/dL / Ketone: x  / Bili: x / Urobili: x   Blood: x / Protein: x / Nitrite: x   Leuk Esterase: x / RBC: x / WBC x   Sq Epi: x / Non Sq Epi: x / Bacteria: x      LIVER FUNCTIONS - ( 08 Apr 2024 06:10 )  Alb: 1.2 g/dL / Pro: 5.1 g/dL / ALK PHOS: 97 U/L / ALT: 13 U/L / AST: 11 U/L / GGT: x             RECENT CULTURES:  04-05 @ 20:11 .Blood Blood     No growth at 48 Hours        04-05 @ 19:10 .Blood Blood     No growth at 48 Hours              All imaging and data are reviewed.     
[facility]  INFECTIOUS DISEASES - [address], [place] [address]  Tel: [phone]     Fax: [phone]  =======================================================    MARGRET GRANDA [id number]    Follow up: No fevers. Still with some loose stools but overall feeling better. Denies any pain, nausea or vomiting.    Allergies:  No Known Allergies      Antibiotics:  acetaminophen     Tablet .. 650 milliGRAM(s) Oral every 6 hours PRN  aluminum hydroxide/magnesium hydroxide/simethicone Suspension 30 milliLiter(s) Oral every 4 hours PRN  bisacodyl 10 milliGRAM(s) Oral <User Schedule>  ferrous    sulfate 325 milliGRAM(s) Oral daily  folic acid 1 milliGRAM(s) Oral daily  influenza   Vaccine 0.5 milliLiter(s) IntraMuscular once  lactobacillus acidophilus 1 Tablet(s) Oral three times a day with meals  melatonin 3 milliGRAM(s) Oral at bedtime PRN  mesalamine ER Capsule 1000 milliGRAM(s) Oral four times a day  methylPREDNISolone sodium succinate Injectable 20 milliGRAM(s) IV Push three times a day  ondansetron Injectable 4 milliGRAM(s) IV Push every 6 hours PRN       REVIEW OF SYSTEMS:  CONSTITUTIONAL:  No Fevers  HEENT:  No sore throat or runny nose.  CARDIOVASCULAR:  No chest pain or SOB.  RESPIRATORY:  No cough, shortness of breath  GASTROINTESTINAL:  see history  GENITOURINARY:  No dysuria, frequency or urgency  MUSCULOSKELETAL:  no joint aches, no muscle pain  NEUROLOGIC:  No headache or dizziness  PSYCHIATRIC:  No disorder of thought or mood.     Physical Exam:  ICU Vital Signs Last 24 Hrs  T(C): 36.5 (09 Apr 2024 12:31), Max: 36.7 (08 Apr 2024 16:33)  T(F): 97.7 (09 Apr 2024 12:31), Max: 98 (08 Apr 2024 16:33)  HR: 88 (09 Apr 2024 12:31) (71 - 100)  BP: 119/74 (09 Apr 2024 12:31) (113/77 - 132/66)  BP(mean): --  ABP: --  ABP(mean): --  RR: 19 (09 Apr 2024 12:31) (17 - 20)  SpO2: 95% (09 Apr 2024 12:31) (94% - 100%)    O2 Parameters below as of 09 Apr 2024 12:31  Patient On (Oxygen Delivery Method): room air      GEN: NAD  HEENT: normocephalic and atraumatic.   NECK: Supple.   LUNGS: Normal respiratory effort  HEART: Regular rate and rhythm  ABDOMEN: Soft, nondistended.    EXTREMITIES: No leg edema.  NEUROLOGIC: grossly intact.  PSYCHIATRIC: Appropriate affect .  Labs:  04-09    142  |  110<H>  |  3<L>  ----------------------------<  131<H>  4.4   |  28  |  0.58    Ca    7.4<L>      09 Apr 2024 06:15  Mg     1.9     04-09    TPro  5.1<L>  /  Alb  1.2<L>  /  TBili  0.4  /  DBili  0.2  /  AST  11<L>  /  ALT  13  /  AlkPhos  97  04-08                          8.7    6.87  )-----------( 560      ( 09 Apr 2024 06:15 )             27.4       Urinalysis Basic - ( 09 Apr 2024 06:15 )    Color: x / Appearance: x / SG: x / pH: x  Gluc: 131 mg/dL / Ketone: x  / Bili: x / Urobili: x   Blood: x / Protein: x / Nitrite: x   Leuk Esterase: x / RBC: x / WBC x   Sq Epi: x / Non Sq Epi: x / Bacteria: x      LIVER FUNCTIONS - ( 08 Apr 2024 06:10 )  Alb: 1.2 g/dL / Pro: 5.1 g/dL / ALK PHOS: 97 U/L / ALT: 13 U/L / AST: 11 U/L / GGT: x             RECENT CULTURES:  04-05 @ 20:11 .Blood Blood     No growth at 72 Hours        04-05 @ 19:10 .Blood Blood     No growth at 72 Hours              All imaging and data are reviewed.   
[facility]  Herson Calabrese PA-C  [address] [place] [address]  [phone]      INTERVAL HPI/OVERNIGHT EVENTS:  Pt s/e  S/p egd/colonoscopy   Pt having less frequent diarrhea now    MEDICATIONS  (STANDING):  bisacodyl 10 milliGRAM(s) Oral <User Schedule>  ferrous    sulfate 325 milliGRAM(s) Oral daily  folic acid 1 milliGRAM(s) Oral daily  influenza   Vaccine 0.5 milliLiter(s) IntraMuscular once  lactobacillus acidophilus 1 Tablet(s) Oral three times a day with meals  mesalamine ER Capsule 1000 milliGRAM(s) Oral four times a day  methylPREDNISolone sodium succinate Injectable 20 milliGRAM(s) IV Push three times a day  sodium chloride 0.9%. 1000 milliLiter(s) (125 mL/Hr) IV Continuous <Continuous>    MEDICATIONS  (PRN):  acetaminophen     Tablet .. 650 milliGRAM(s) Oral every 6 hours PRN Temp greater or equal to 38C (100.4F), Mild Pain (1 - 3)  aluminum hydroxide/magnesium hydroxide/simethicone Suspension 30 milliLiter(s) Oral every 4 hours PRN Dyspepsia  melatonin 3 milliGRAM(s) Oral at bedtime PRN Insomnia  ondansetron Injectable 4 milliGRAM(s) IV Push every 6 hours PRN Nausea and/or Vomiting      Allergies    No Known Allergies    PHYSICAL EXAM:   Vital Signs:  Vital Signs Last 24 Hrs  T(C): 36.7 (2024 05:06), Max: 36.7 (2024 16:33)  T(F): 98 (2024 05:06), Max: 98 (2024 16:33)  HR: 71 (2024 05:06) (71 - 100)  BP: 113/77 (2024 05:06) (113/77 - 132/66)  BP(mean): --  RR: 20 (2024 05:06) (17 - 20)  SpO2: 95% (2024 05:06) (94% - 100%)    Parameters below as of 2024 05:06  Patient On (Oxygen Delivery Method): room air      Daily     Daily Weight in k.4 (2024 05:06)    GENERAL:  Appears stated age  HEENT:  NC/AT  CHEST:  Full & symmetric excursion  HEART:  Regular rhythm  ABDOMEN:  Soft, non-tender, non-distended  EXTEREMITIES:  no cyanosis  SKIN:  No rash  NEURO:  Alert      LABS:                        8.7    6.87  )-----------( 560      ( 2024 06:15 )             27.4     04-09    142  |  110<H>  |  3<L>  ----------------------------<  131<H>  4.4   |  28  |  0.58    Ca    7.4<L>      2024 06:15  Mg     1.9     04-09    TPro  5.1<L>  /  Alb  1.2<L>  /  TBili  0.4  /  DBili  0.2  /  AST  11<L>  /  ALT  13  /  AlkPhos  97  04-08      Urinalysis Basic - ( 2024 06:15 )    Color: x / Appearance: x / SG: x / pH: x  Gluc: 131 mg/dL / Ketone: x  / Bili: x / Urobili: x   Blood: x / Protein: x / Nitrite: x   Leuk Esterase: x / RBC: x / WBC x   Sq Epi: x / Non Sq Epi: x / Bacteria: x
INTERVAL HPI/OVERNIGHT EVENTS:  No new overnight event.  No N/V/D.  Tolerating diet.  feels better still weak  orthostatic in er as per patient    Allergies    No Known Allergies    Intolerances    General:  No wt loss, fevers, chills, night sweats, fatigue,   Eyes:  Good vision, no reported pain  ENT:  No sore throat, pain, runny nose, dysphagia  CV:  No pain, palpitations, hypo/hypertension  Resp:  No dyspnea, cough, tachypnea, wheezing  GI:  No pain, No nausea, No vomiting, No diarrhea, No constipation, No weight loss, No fever, No pruritis, No rectal bleeding, No tarry stools, No dysphagia,  :  No pain, bleeding, incontinence, nocturia  Muscle:  No pain, weakness  Neuro:  No weakness, tingling, memory problems  Psych:  No fatigue, insomnia, mood problems, depression  Endocrine:  No polyuria, polydipsia, cold/heat intolerance  Heme:  No petechiae, ecchymosis, easy bruisability  Skin:  No rash, tattoos, scars, edema      PHYSICAL EXAM:   Vital Signs:  Vital Signs Last 24 Hrs  T(C): 37.1 (06 Apr 2024 11:45), Max: 37.1 (06 Apr 2024 11:45)  T(F): 98.8 (06 Apr 2024 11:45), Max: 98.8 (06 Apr 2024 11:45)  HR: 106 (06 Apr 2024 11:45) (90 - 138)  BP: 116/77 (06 Apr 2024 11:45) (113/66 - 149/99)  BP(mean): --  RR: 18 (06 Apr 2024 11:45) (16 - 18)  SpO2: 97% (06 Apr 2024 11:45) (94% - 100%)    Parameters below as of 06 Apr 2024 11:45  Patient On (Oxygen Delivery Method): room air      Daily Height in cm: 190.5 (05 Apr 2024 14:33)    Daily I&O's Summary      GENERAL:  Appears stated age, well-groomed, well-nourished, no distress  HEENT:  NC/AT,  conjunctivae clear and pink, no thyromegaly, nodules, adenopathy, no JVD, sclera -anicteric  CHEST:  Full & symmetric excursion, no increased effort, breath sounds clear  HEART:  Regular rhythm, S1, S2, no murmur/rub/S3/S4, no abdominal bruit, no edema  ABDOMEN:  Soft, non-tender, non-distended, normoactive bowel sounds,  no masses ,no hepato-splenomegaly, no signs of chronic liver disease  EXTEREMITIES:  no cyanosis,clubbing or edema  SKIN:  No rash/erythema/ecchymoses/petechiae/wounds/abscess/warm/dry  NEURO:  Alert, oriented, no asterixis, no tremor, no encephalopathy      LABS:                        9.4    11.68 )-----------( 641      ( 06 Apr 2024 06:50 )             30.1     04-06    146<H>  |  108  |  8   ----------------------------<  103<H>  3.8   |  27  |  0.77    Ca    8.0<L>      06 Apr 2024 06:50  Mg     1.7     04-06    TPro  5.8<L>  /  Alb  1.3<L>  /  TBili  0.6  /  DBili  x   /  AST  9<L>  /  ALT  13  /  AlkPhos  123<H>  04-06      Urinalysis Basic - ( 06 Apr 2024 06:50 )    Color: x / Appearance: x / SG: x / pH: x  Gluc: 103 mg/dL / Ketone: x  / Bili: x / Urobili: x   Blood: x / Protein: x / Nitrite: x   Leuk Esterase: x / RBC: x / WBC x   Sq Epi: x / Non Sq Epi: x / Bacteria: x      amylase   lipaseLipase: 7 U/L (04-05 @ 16:40)    RADIOLOGY & ADDITIONAL TESTS:  
[facility]  Herson Calabrese PA-C  [address] [place] [address]  [phone]      INTERVAL HPI/OVERNIGHT EVENTS:  Pt s/e with family at bedside  Appetite improving  Stool more formed    MEDICATIONS  (STANDING):  bisacodyl 10 milliGRAM(s) Oral <User Schedule>  ferrous    sulfate 325 milliGRAM(s) Oral daily  folic acid 1 milliGRAM(s) Oral daily  influenza   Vaccine 0.5 milliLiter(s) IntraMuscular once  lactobacillus acidophilus 1 Tablet(s) Oral three times a day with meals  mesalamine ER Capsule 1000 milliGRAM(s) Oral four times a day  methylPREDNISolone sodium succinate Injectable 20 milliGRAM(s) IV Push three times a day    MEDICATIONS  (PRN):  acetaminophen     Tablet .. 650 milliGRAM(s) Oral every 6 hours PRN Temp greater or equal to 38C (100.4F), Mild Pain (1 - 3)  aluminum hydroxide/magnesium hydroxide/simethicone Suspension 30 milliLiter(s) Oral every 4 hours PRN Dyspepsia  melatonin 3 milliGRAM(s) Oral at bedtime PRN Insomnia  ondansetron Injectable 4 milliGRAM(s) IV Push every 6 hours PRN Nausea and/or Vomiting      Allergies    No Known Allergies    PHYSICAL EXAM:   Vital Signs:  Vital Signs Last 24 Hrs  T(C): 36.4 (10 Apr 2024 05:41), Max: 36.5 (2024 12:31)  T(F): 97.5 (10 Apr 2024 05:41), Max: 97.7 (2024 12:31)  HR: 74 (10 Apr 2024 05:41) (74 - 90)  BP: 118/76 (10 Apr 2024 05:41) (118/73 - 119/74)  BP(mean): --  RR: 18 (10 Apr 2024 05:41) (18 - 19)  SpO2: 97% (10 Apr 2024 05:41) (95% - 97%)    Parameters below as of 10 Apr 2024 05:41  Patient On (Oxygen Delivery Method): room air      Daily     Daily Weight in k (10 Apr 2024 05:41)    GENERAL:  Appears stated age  HEENT:  NC/AT  CHEST:  Full & symmetric excursion  HEART:  Regular rhythm  ABDOMEN:  Soft, non-tender, non-distended  EXTEREMITIES:  no cyanosis  SKIN:  No rash  NEURO:  Alert      LABS:                        8.8    8.47  )-----------( 599      ( 10 Apr 2024 07:40 )             28.2     04-10    142  |  108  |  4<L>  ----------------------------<  132<H>  4.1   |  29  |  0.52    Ca    8.3<L>      10 Apr 2024 07:40  Mg     1.8     04-10        Urinalysis Basic - ( 10 Apr 2024 07:40 )    Color: x / Appearance: x / SG: x / pH: x  Gluc: 132 mg/dL / Ketone: x  / Bili: x / Urobili: x   Blood: x / Protein: x / Nitrite: x   Leuk Esterase: x / RBC: x / WBC x   Sq Epi: x / Non Sq Epi: x / Bacteria: x  
Date of Service: 04-06-24 @ 11:14    Patient is a 38y old  Male who presents with a chief complaint of diarrhea (05 Apr 2024 15:49)      INTERVAL HPI/OVERNIGHT EVENTS: Patient seen and examined. NAD. c/o diarrhea and lightheadedness when standing.    Vital Signs Last 24 Hrs  T(C): 36.6 (06 Apr 2024 08:44), Max: 36.6 (06 Apr 2024 08:44)  T(F): 97.9 (06 Apr 2024 08:44), Max: 97.9 (06 Apr 2024 08:44)  HR: 115 (06 Apr 2024 08:44) (90 - 138)  BP: 114/70 (06 Apr 2024 08:44) (113/66 - 149/99)  BP(mean): --  RR: 16 (06 Apr 2024 08:44) (16 - 18)  SpO2: 98% (06 Apr 2024 08:44) (94% - 100%)    Parameters below as of 06 Apr 2024 08:44  Patient On (Oxygen Delivery Method): room air        04-06    146<H>  |  108  |  8   ----------------------------<  103<H>  3.8   |  27  |  0.77    Ca    8.0<L>      06 Apr 2024 06:50  Mg     1.7     04-06    TPro  5.8<L>  /  Alb  1.3<L>  /  TBili  0.6  /  DBili  x   /  AST  9<L>  /  ALT  13  /  AlkPhos  123<H>  04-06                          9.4    11.68 )-----------( 641      ( 06 Apr 2024 06:50 )             30.1       CAPILLARY BLOOD GLUCOSE      POCT Blood Glucose.: 176 mg/dL (05 Apr 2024 14:42)    Urinalysis Basic - ( 06 Apr 2024 06:50 )    Color: x / Appearance: x / SG: x / pH: x  Gluc: 103 mg/dL / Ketone: x  / Bili: x / Urobili: x   Blood: x / Protein: x / Nitrite: x   Leuk Esterase: x / RBC: x / WBC x   Sq Epi: x / Non Sq Epi: x / Bacteria: x              acetaminophen     Tablet .. 650 milliGRAM(s) Oral every 6 hours PRN  aluminum hydroxide/magnesium hydroxide/simethicone Suspension 30 milliLiter(s) Oral every 4 hours PRN  lactobacillus acidophilus 1 Tablet(s) Oral three times a day with meals  melatonin 3 milliGRAM(s) Oral at bedtime PRN  ondansetron Injectable 4 milliGRAM(s) IV Push every 6 hours PRN  piperacillin/tazobactam IVPB.. 3.375 Gram(s) IV Intermittent every 8 hours  sodium chloride 0.9%. 1000 milliLiter(s) IV Continuous <Continuous>              REVIEW OF SYSTEMS:  CONSTITUTIONAL: No fever, no weight loss, or no fatigue  NECK: No pain, no stiffness  RESPIRATORY: No cough, no wheezing, no chills, no hemoptysis, No shortness of breath  CARDIOVASCULAR: No chest pain, no palpitations, no dizziness, no leg swelling  GASTROINTESTINAL: No abdominal pain. No nausea, no vomiting, no hematemesis; + diarrhea, no constipation. No melena, no hematochezia.  GENITOURINARY: No dysuria, no frequency, no hematuria, no incontinence  NEUROLOGICAL: No headaches, no loss of strength, no numbness, no tremors  SKIN: No itching, no burning  MUSCULOSKELETAL: No joint pain, no swelling; No muscle, no back, no extremity pain  PSYCHIATRIC: No depression, no mood swings,   HEME/LYMPH: No easy bruising, no bleeding gums  ALLERY AND IMMUNOLOGIC: No hives       Consultant(s) Notes Reviewed:  [X] YES  [ ] NO    PHYSICAL EXAM:  GENERAL: NAD  HEAD:  Atraumatic, Normocephalic  EYES: EOMI, PERRLA, conjunctiva and sclera clear  ENMT: No tonsillar erythema, exudates, or enlargement; Moist mucous membranes  NECK: Supple, No JVD  NERVOUS SYSTEM:  Awake & alert  CHEST/LUNG: Clear to auscultation bilaterally; No rales, rhonchi, wheezing,  HEART: Regular rate and rhythm  ABDOMEN: Soft, Nontender, Nondistended; Bowel sounds present  EXTREMITIES:  No clubbing, cyanosis, or edema  LYMPH: No lymphadenopathy noted  SKIN: No rashes      Advanced care planning discussed with patient/family [X] YES   [ ] NO    Advanced care planning discussed with patient/family. Patient's health status was discussed. All appropriate changes have been made regarding patient's end-of-life care. Advanced care planning forms reviewed/discussed/completed.  20 minutes spent.   
Date of Service: 04-07-24 @ 11:56    Patient is a 38y old  Male who presents with a chief complaint of diarrhea (07 Apr 2024 10:28)      INTERVAL HPI/OVERNIGHT EVENTS: Patient seen and examined. NAD. + diarrhea    Vital Signs Last 24 Hrs  T(C): 36.8 (07 Apr 2024 05:03), Max: 36.8 (07 Apr 2024 05:03)  T(F): 98.2 (07 Apr 2024 05:03), Max: 98.2 (07 Apr 2024 05:03)  HR: 90 (07 Apr 2024 05:03) (90 - 90)  BP: 114/72 (07 Apr 2024 05:03) (114/72 - 114/72)  BP(mean): --  RR: 18 (07 Apr 2024 05:03) (18 - 18)  SpO2: 96% (07 Apr 2024 05:03) (96% - 96%)    Parameters below as of 07 Apr 2024 05:03  Patient On (Oxygen Delivery Method): room air        04-07    140  |  106  |  6<L>  ----------------------------<  101<H>  3.5   |  26  |  0.59    Ca    8.0<L>      07 Apr 2024 07:26  Mg     1.8     04-07    TPro  5.1<L>  /  Alb  1.1<L>  /  TBili  0.4  /  DBili  x   /  AST  10<L>  /  ALT  11<L>  /  AlkPhos  97  04-07                          8.3    9.04  )-----------( 515      ( 07 Apr 2024 07:26 )             26.4       CAPILLARY BLOOD GLUCOSE        Urinalysis Basic - ( 07 Apr 2024 07:26 )    Color: x / Appearance: x / SG: x / pH: x  Gluc: 101 mg/dL / Ketone: x  / Bili: x / Urobili: x   Blood: x / Protein: x / Nitrite: x   Leuk Esterase: x / RBC: x / WBC x   Sq Epi: x / Non Sq Epi: x / Bacteria: x    Clostridium difficile Toxin by PCR (04.05.24 @ 22:50)   C Diff by PCR Result: NotDetec:GI PCR Panel Stool (04.05.24 @ 15:47)   GI PCR Panel: NotDetec:           acetaminophen     Tablet .. 650 milliGRAM(s) Oral every 6 hours PRN  aluminum hydroxide/magnesium hydroxide/simethicone Suspension 30 milliLiter(s) Oral every 4 hours PRN  bisacodyl 10 milliGRAM(s) Oral <User Schedule>  influenza   Vaccine 0.5 milliLiter(s) IntraMuscular once  lactobacillus acidophilus 1 Tablet(s) Oral three times a day with meals  melatonin 3 milliGRAM(s) Oral at bedtime PRN  ondansetron Injectable 4 milliGRAM(s) IV Push every 6 hours PRN  piperacillin/tazobactam IVPB.. 3.375 Gram(s) IV Intermittent every 8 hours  polyethylene glycol/electrolyte Solution 1000 milliLiter(s) Oral every 4 hours  sodium chloride 0.9%. 1000 milliLiter(s) IV Continuous <Continuous>              REVIEW OF SYSTEMS:  CONSTITUTIONAL: No fever, no weight loss, or no fatigue  NECK: No pain, no stiffness  RESPIRATORY: No cough, no wheezing, no chills, no hemoptysis, No shortness of breath  CARDIOVASCULAR: No chest pain, no palpitations, no dizziness, no leg swelling  GASTROINTESTINAL: No abdominal pain. No nausea, no vomiting, no hematemesis; No diarrhea, no constipation. No melena, no hematochezia.  GENITOURINARY: No dysuria, no frequency, no hematuria, no incontinence  NEUROLOGICAL: No headaches, no loss of strength, no numbness, no tremors  SKIN: No itching, no burning  MUSCULOSKELETAL: No joint pain, no swelling; No muscle, no back, no extremity pain  PSYCHIATRIC: No depression, no mood swings,   HEME/LYMPH: No easy bruising, no bleeding gums  ALLERY AND IMMUNOLOGIC: No hives       Consultant(s) Notes Reviewed:  [X] YES  [ ] NO    PHYSICAL EXAM:  GENERAL: NAD  HEAD:  Atraumatic, Normocephalic  EYES: EOMI, PERRLA, conjunctiva and sclera clear  ENMT: No tonsillar erythema, exudates, or enlargement; Moist mucous membranes  NECK: Supple, No JVD  NERVOUS SYSTEM:  Awake & alert  CHEST/LUNG: Clear to auscultation bilaterally; No rales, rhonchi, wheezing,  HEART: Regular rate and rhythm  ABDOMEN: Soft, Nontender, Nondistended; Bowel sounds present  EXTREMITIES:  No clubbing, cyanosis, or edema  LYMPH: No lymphadenopathy noted  SKIN: No rashes      Advanced care planning discussed with patient/family [X] YES   [ ] NO    Advanced care planning discussed with patient/family. Patient's health status was discussed. All appropriate changes have been made regarding patient's end-of-life care. Advanced care planning forms reviewed/discussed/completed.  20 minutes spent.   
Date of Service: 04-08-24 @ 10:43    Patient is a 38y old  Male who presents with a chief complaint of Dehydration     (08 Apr 2024 09:26)      INTERVAL HPI/OVERNIGHT EVENTS: Patient seen and examined. NAD. No complaints.    Vital Signs Last 24 Hrs  T(C): 36.6 (08 Apr 2024 04:44), Max: 36.8 (07 Apr 2024 13:00)  T(F): 97.9 (08 Apr 2024 04:44), Max: 98.2 (07 Apr 2024 13:00)  HR: 85 (08 Apr 2024 04:44) (85 - 97)  BP: 110/- (08 Apr 2024 04:44) (104/68 - 111/70)  BP(mean): 69 (08 Apr 2024 04:44) (69 - 69)  RR: 18 (08 Apr 2024 04:44) (18 - 18)  SpO2: 97% (08 Apr 2024 04:44) (97% - 98%)    Parameters below as of 08 Apr 2024 04:44  Patient On (Oxygen Delivery Method): room air        04-08    143  |  109<H>  |  4<L>  ----------------------------<  93  3.4<L>   |  29  |  0.59    Ca    8.1<L>      08 Apr 2024 06:10  Mg     1.9     04-08    TPro  5.1<L>  /  Alb  1.2<L>  /  TBili  0.4  /  DBili  0.2  /  AST  11<L>  /  ALT  13  /  AlkPhos  97  04-08                          8.3    6.58  )-----------( 512      ( 08 Apr 2024 06:10 )             26.1       CAPILLARY BLOOD GLUCOSE        Urinalysis Basic - ( 08 Apr 2024 06:10 )    Color: x / Appearance: x / SG: x / pH: x  Gluc: 93 mg/dL / Ketone: x  / Bili: x / Urobili: x   Blood: x / Protein: x / Nitrite: x   Leuk Esterase: x / RBC: x / WBC x   Sq Epi: x / Non Sq Epi: x / Bacteria: x              acetaminophen     Tablet .. 650 milliGRAM(s) Oral every 6 hours PRN  aluminum hydroxide/magnesium hydroxide/simethicone Suspension 30 milliLiter(s) Oral every 4 hours PRN  bisacodyl 10 milliGRAM(s) Oral <User Schedule>  influenza   Vaccine 0.5 milliLiter(s) IntraMuscular once  lactobacillus acidophilus 1 Tablet(s) Oral three times a day with meals  melatonin 3 milliGRAM(s) Oral at bedtime PRN  ondansetron Injectable 4 milliGRAM(s) IV Push every 6 hours PRN  piperacillin/tazobactam IVPB.. 3.375 Gram(s) IV Intermittent every 8 hours  sodium chloride 0.9%. 1000 milliLiter(s) IV Continuous <Continuous>              REVIEW OF SYSTEMS:  CONSTITUTIONAL: No fever, no weight loss, or no fatigue  NECK: No pain, no stiffness  RESPIRATORY: No cough, no wheezing, no chills, no hemoptysis, No shortness of breath  CARDIOVASCULAR: No chest pain, no palpitations, no dizziness, no leg swelling  GASTROINTESTINAL: No abdominal pain. No nausea, no vomiting, no hematemesis; No diarrhea, no constipation. No melena, no hematochezia.  GENITOURINARY: No dysuria, no frequency, no hematuria, no incontinence  NEUROLOGICAL: No headaches, no loss of strength, no numbness, no tremors  SKIN: No itching, no burning  MUSCULOSKELETAL: No joint pain, no swelling; No muscle, no back, no extremity pain  PSYCHIATRIC: No depression, no mood swings,   HEME/LYMPH: No easy bruising, no bleeding gums  ALLERY AND IMMUNOLOGIC: No hives       Consultant(s) Notes Reviewed:  [X] YES  [ ] NO    PHYSICAL EXAM:  GENERAL: NAD  HEAD:  Atraumatic, Normocephalic  EYES: EOMI, PERRLA, conjunctiva and sclera clear  ENMT: No tonsillar erythema, exudates, or enlargement; Moist mucous membranes  NECK: Supple, No JVD  NERVOUS SYSTEM:  Awake & alert  CHEST/LUNG: Clear to auscultation bilaterally; No rales, rhonchi, wheezing,  HEART: Regular rate and rhythm  ABDOMEN: Soft, Nontender, Nondistended; Bowel sounds present  EXTREMITIES:  No clubbing, cyanosis, or edema  LYMPH: No lymphadenopathy noted  SKIN: No rashes      Advanced care planning discussed with patient/family [X] YES   [ ] NO    Advanced care planning discussed with patient/family. Patient's health status was discussed. All appropriate changes have been made regarding patient's end-of-life care. Advanced care planning forms reviewed/discussed/completed.  20 minutes spent.   
Date of Service: 04-10-24 @ 11:25    Patient is a 38y old  Male who presents with a chief complaint of diarrhea (09 Apr 2024 12:54)      INTERVAL HPI/OVERNIGHT EVENTS: Patient seen and examined. NAD. Feeling better. Stool more formed, soft.    Vital Signs Last 24 Hrs  T(C): 36.4 (10 Apr 2024 05:41), Max: 36.5 (09 Apr 2024 12:31)  T(F): 97.5 (10 Apr 2024 05:41), Max: 97.7 (09 Apr 2024 12:31)  HR: 74 (10 Apr 2024 05:41) (74 - 90)  BP: 118/76 (10 Apr 2024 05:41) (118/73 - 119/74)  BP(mean): --  RR: 18 (10 Apr 2024 05:41) (18 - 19)  SpO2: 97% (10 Apr 2024 05:41) (95% - 97%)    Parameters below as of 10 Apr 2024 05:41  Patient On (Oxygen Delivery Method): room air        04-10    142  |  108  |  4<L>  ----------------------------<  132<H>  4.1   |  29  |  0.52    Ca    8.3<L>      10 Apr 2024 07:40  Mg     1.8     04-10                            8.8    8.47  )-----------( 599      ( 10 Apr 2024 07:40 )             28.2       CAPILLARY BLOOD GLUCOSE        Urinalysis Basic - ( 10 Apr 2024 07:40 )    Color: x / Appearance: x / SG: x / pH: x  Gluc: 132 mg/dL / Ketone: x  / Bili: x / Urobili: x   Blood: x / Protein: x / Nitrite: x   Leuk Esterase: x / RBC: x / WBC x   Sq Epi: x / Non Sq Epi: x / Bacteria: x              acetaminophen     Tablet .. 650 milliGRAM(s) Oral every 6 hours PRN  aluminum hydroxide/magnesium hydroxide/simethicone Suspension 30 milliLiter(s) Oral every 4 hours PRN  bisacodyl 10 milliGRAM(s) Oral <User Schedule>  ferrous    sulfate 325 milliGRAM(s) Oral daily  folic acid 1 milliGRAM(s) Oral daily  influenza   Vaccine 0.5 milliLiter(s) IntraMuscular once  lactobacillus acidophilus 1 Tablet(s) Oral three times a day with meals  melatonin 3 milliGRAM(s) Oral at bedtime PRN  mesalamine ER Capsule 1000 milliGRAM(s) Oral four times a day  methylPREDNISolone sodium succinate Injectable 20 milliGRAM(s) IV Push three times a day  ondansetron Injectable 4 milliGRAM(s) IV Push every 6 hours PRN      Vital Signs Last 24 Hrs  T(C): 36.4 (10 Apr 2024 05:41), Max: 36.5 (09 Apr 2024 12:31)  T(F): 97.5 (10 Apr 2024 05:41), Max: 97.7 (09 Apr 2024 12:31)  HR: 74 (10 Apr 2024 05:41) (74 - 90)  BP: 118/76 (10 Apr 2024 05:41) (118/73 - 119/74)  BP(mean): --  RR: 18 (10 Apr 2024 05:41) (18 - 19)  SpO2: 97% (10 Apr 2024 05:41) (95% - 97%)    Parameters below as of 10 Apr 2024 05:41  Patient On (Oxygen Delivery Method): room air        04-10    142  |  108  |  4<L>  ----------------------------<  132<H>  4.1   |  29  |  0.52    Ca    8.3<L>      10 Apr 2024 07:40  Mg     1.8     04-10                            8.8    8.47  )-----------( 599      ( 10 Apr 2024 07:40 )             28.2       CAPILLARY BLOOD GLUCOSE        Urinalysis Basic - ( 10 Apr 2024 07:40 )    Color: x / Appearance: x / SG: x / pH: x  Gluc: 132 mg/dL / Ketone: x  / Bili: x / Urobili: x   Blood: x / Protein: x / Nitrite: x   Leuk Esterase: x / RBC: x / WBC x   Sq Epi: x / Non Sq Epi: x / Bacteria: x              acetaminophen     Tablet .. 650 milliGRAM(s) Oral every 6 hours PRN  aluminum hydroxide/magnesium hydroxide/simethicone Suspension 30 milliLiter(s) Oral every 4 hours PRN  bisacodyl 10 milliGRAM(s) Oral <User Schedule>  ferrous    sulfate 325 milliGRAM(s) Oral daily  folic acid 1 milliGRAM(s) Oral daily  influenza   Vaccine 0.5 milliLiter(s) IntraMuscular once  lactobacillus acidophilus 1 Tablet(s) Oral three times a day with meals  melatonin 3 milliGRAM(s) Oral at bedtime PRN  mesalamine ER Capsule 1000 milliGRAM(s) Oral four times a day  methylPREDNISolone sodium succinate Injectable 20 milliGRAM(s) IV Push three times a day  ondansetron Injectable 4 milliGRAM(s) IV Push every 6 hours PRN              REVIEW OF SYSTEMS:  CONSTITUTIONAL: No fever, no weight loss, or no fatigue  NECK: No pain, no stiffness  RESPIRATORY: No cough, no wheezing, no chills, no hemoptysis, No shortness of breath  CARDIOVASCULAR: No chest pain, no palpitations, no dizziness, no leg swelling  GASTROINTESTINAL: No abdominal pain. No nausea, no vomiting, no hematemesis; No diarrhea, no constipation. No melena, no hematochezia.  GENITOURINARY: No dysuria, no frequency, no hematuria, no incontinence  NEUROLOGICAL: No headaches, no loss of strength, no numbness, no tremors  SKIN: No itching, no burning  MUSCULOSKELETAL: No joint pain, no swelling; No muscle, no back, no extremity pain  PSYCHIATRIC: No depression, no mood swings,   HEME/LYMPH: No easy bruising, no bleeding gums  ALLERY AND IMMUNOLOGIC: No hives       Consultant(s) Notes Reviewed:  [X] YES  [ ] NO    PHYSICAL EXAM:  GENERAL: NAD  HEAD:  Atraumatic, Normocephalic  EYES: EOMI, PERRLA, conjunctiva and sclera clear  ENMT: No tonsillar erythema, exudates, or enlargement; Moist mucous membranes  NECK: Supple, No JVD  NERVOUS SYSTEM:  Awake & alert  CHEST/LUNG: Clear to auscultation bilaterally; No rales, rhonchi, wheezing,  HEART: Regular rate and rhythm  ABDOMEN: Soft, Nontender, Nondistended; Bowel sounds present  EXTREMITIES:  No clubbing, cyanosis, or edema  LYMPH: No lymphadenopathy noted  SKIN: No rashes      Advanced care planning discussed with patient/family [X] YES   [ ] NO    Advanced care planning discussed with patient/family. Patient's health status was discussed. All appropriate changes have been made regarding patient's end-of-life care. Advanced care planning forms reviewed/discussed/completed.  20 minutes spent.

## 2024-04-10 NOTE — PROGRESS NOTE ADULT - REASON FOR ADMISSION
diarrhea

## 2024-04-10 NOTE — DISCHARGE NOTE PROVIDER - NSDCMRMEDTOKEN_GEN_ALL_CORE_FT
ferrous sulfate 325 mg (65 mg elemental iron) oral tablet: 1 tab(s) orally once a day  lactobacillus acidophilus oral capsule: 1 cap(s) orally 2 times a day  mesalamine 500 mg oral capsule, extended release: 2 cap(s) orally 4 times a day  Multiple Vitamins oral tablet: 1 tab(s) orally once a day  predniSONE 10 mg oral tablet: 4 tab(s) orally once a day Take 4 tablets (40mg) daily for 7 days, then 3 tabs (30mg) daily for 7 days, etc. (decrease by 10mg each week)

## 2024-04-10 NOTE — PROGRESS NOTE ADULT - PROVIDER SPECIALTY LIST ADULT
Gastroenterology
Gastroenterology
Infectious Disease
Gastroenterology
Infectious Disease
Internal Medicine
Gastroenterology
Gastroenterology
Internal Medicine

## 2024-04-10 NOTE — PROGRESS NOTE ADULT - PROBLEM SELECTOR PLAN 6
He's followed as outpatient with regular CT scans  He reports that his most recent CT scan done in the last 3 months was unchanged

## 2024-04-10 NOTE — PROGRESS NOTE ADULT - PROBLEM SELECTOR PLAN 3
Diet as tolerated  Nutrition consult
Diet as tolerated  Nutrition consult noted
Diet as tolerated  Nutrition consult noted
Diet as tolerated  Nutrition consult
Diet as tolerated  Nutrition consult

## 2024-04-10 NOTE — DISCHARGE NOTE PROVIDER - CARE PROVIDER_API CALL
Case Wade  Internal Medicine  [address], [address]  [place] [address]  Phone: [phone]  Fax: [phone]  Established Patient  Follow Up Time: 1 week    Herson Macdonald  Gastroenterology  [address] [place] [address]  Phone: [phone]  Fax: [phone]  Established Patient  Follow Up Time: 1 week

## 2024-04-10 NOTE — DISCHARGE NOTE PROVIDER - NSDCCPCAREPLAN_GEN_ALL_CORE_FT
PRINCIPAL DISCHARGE DIAGNOSIS  Diagnosis: Pancolitis  Assessment and Plan of Treatment: Take prednisone 10mg tablets: 4 tabs (40mg) daily for 7 days, then 3 tabs (30mg) daily for 7 days, etc. -- decrease by 10mg daily each week  Follow-up with GI next week  Continue mesalamine  Follow-up with your primary care doctor within 1 week.        SECONDARY DISCHARGE DIAGNOSES  Diagnosis: Anemia  Assessment and Plan of Treatment: Continue iron  Follow-up with your primary care doctor within 1 week.

## 2024-04-10 NOTE — DISCHARGE NOTE PROVIDER - DETAILS OF MALNUTRITION DIAGNOSIS/DIAGNOSES
Diminished, coarse crackles, + retractions, + abdominal breathing, Pt having progressively worse diff breathing today. Pt received albuterol at 1800. No fevers at home. + PO. Pt had [place] last week. PMH of pneumonia x1. Pt coughing and crying in triage. This patient has been assessed with a concern for Malnutrition and was treated during this hospitalization for the following Nutrition diagnosis/diagnoses:     -  04/08/2024: Severe protein-calorie malnutrition

## 2024-04-10 NOTE — PROGRESS NOTE ADULT - PROBLEM SELECTOR PLAN 4
Multifactorial  Started on iron  Monitor h/h  Transfuse prn  Hematology consult noted
Multifactorial  Started on iron  Monitor h/h  Transfuse prn  Hematology consult noted
Follow-up anemia serologies  Monitor h/h  Transfuse prn  Hematology consult
Patient reports that he stopped taking his Zoloft months ago  Will hold for now
Patient reports that he stopped taking his Zoloft months ago  Will hold for now

## 2024-04-10 NOTE — DISCHARGE NOTE PROVIDER - HOSPITAL COURSE
This is a 38 M with PMH of anxiety hydrocephalus who came c/o diarrhea x 2 months. Patient was diagnosed with Norovirus in early March which he recovered from. Patient is having 10-12 episodes of watery diarrhea with specks of blood. Today he was scheduled for an outpatient colonoscopy. He was found to have SBP 70/40 prior to colonoscopy. Patient feels weak and dizzy. He's had a 30lb weight loss in the past month. He denies fevers, n/v/abdominal pain.    Patient admitted with pan-colitis as seen on CT with severe sepsis  Treated with empiric iv abx but all cultures NTD  EGD: PUD, gastritis  Colonoscopy: colitis  Cytopath pending  Started on iv steroids and mesalamine with improvement in symptoms  Also with severe caloric protein malnutrition and anemia     >35 minutes spent on discharge

## 2024-04-10 NOTE — DISCHARGE NOTE NURSING/CASE MANAGEMENT/SOCIAL WORK - PATIENT PORTAL LINK FT
You can access the FollowMyHealth Patient Portal offered by [facility] by registering at the following website: [email or web address]. By joining [employer]’s FollowMyHealth portal, you will also be able to view your health information using other applications (apps) compatible with our system.

## 2024-04-10 NOTE — PROGRESS NOTE ADULT - PROBLEM SELECTOR PLAN 5
He's followed as outpatient with regular CT scans  He reports that his most recent CT scan done in the last 3 months was unchanged
Patient reports that he stopped taking his Zoloft months ago  Will hold for now
He's followed as outpatient with regular CT scans  He reports that his most recent CT scan done in the last 3 months was unchanged

## 2024-04-10 NOTE — PROGRESS NOTE ADULT - NUTRITIONAL ASSESSMENT
This patient has been assessed with a concern for Malnutrition and has been determined to have a diagnosis/diagnoses of Severe protein-calorie malnutrition.    This patient is being managed with:   Diet Clear Liquid-  Entered: Apr 7 2024  7:32AM    Diet NPO after Midnight-     NPO Start Date: 07-Apr-2024   NPO Start Time: 23:59  Except Medications  Entered: Apr 7 2024  7:32AM  
This patient has been assessed with a concern for Malnutrition and has been determined to have a diagnosis/diagnoses of Severe protein-calorie malnutrition.    This patient is being managed with:   Diet Regular-  Entered: Apr 8 2024  4:30PM  
This patient has been assessed with a concern for Malnutrition and has been determined to have a diagnosis/diagnoses of Severe protein-calorie malnutrition.    This patient is being managed with:   Diet Regular-  Entered: Apr 8 2024  4:30PM

## 2024-04-10 NOTE — PROGRESS NOTE ADULT - PROBLEM SELECTOR PLAN 2
S/P colonoscopy -- colitis  Started on iv solumedrol and mesalamine  GI PCR stool, stool for C diff negative  Diet as tolerated  Continue Probiotics  GI f/u  Further work-up/management pending clinical course.
S/P colonoscopy -- colitis, f/u cytopath  Started on iv solumedrol and mesalamine with improvement  GI PCR stool, stool for C diff negative  Diet as tolerated  Continue Probiotics  GI f/u
Unclear etiology  GI PCR stool, stool for C diff negative  Diet as tolerated  Continue Probiotics  May proceed for EGD/colonoscopy on Monday, 4/8  GI f/u  Further work-up/management pending clinical course.
Unclear etiology  GI PCR stool, stool for C diff negative  Diet as tolerated  Continue Probiotics  May proceed for EGD/colonoscopy today  GI f/u  Further work-up/management pending clinical course.
Unclear etiology  F/U GI PCR stool, stool for C diff, CMV PCR  Diet as tolerated  Continue Probiotics  Scheduled for EGD/colonoscopy on Monday, 4/8  GI f/u  Further work-up/management pending clinical course.

## 2024-04-11 LAB
CULTURE RESULTS: SIGNIFICANT CHANGE UP
CULTURE RESULTS: SIGNIFICANT CHANGE UP
SPECIMEN SOURCE: SIGNIFICANT CHANGE UP
SPECIMEN SOURCE: SIGNIFICANT CHANGE UP

## 2024-04-12 LAB
GAMMA INTERFERON BACKGROUND BLD IA-ACNC: 0.02 IU/ML — SIGNIFICANT CHANGE UP
M TB IFN-G BLD-IMP: ABNORMAL
M TB IFN-G CD4+ BCKGRND COR BLD-ACNC: 0 IU/ML — SIGNIFICANT CHANGE UP
M TB IFN-G CD4+CD8+ BCKGRND COR BLD-ACNC: 0 IU/ML — SIGNIFICANT CHANGE UP
QUANT TB PLUS MITOGEN MINUS NIL: 0.07 IU/ML — SIGNIFICANT CHANGE UP

## 2024-10-12 NOTE — DISCHARGE NOTE PROVIDER - PROVIDER RX CONTACT NUMBER
ASSESSMENT:  6y5m old female with constipation and perianal excoriations.    PLAN:  - No acute surgical intervention indicated at this time  - Recommend bowel regimen  - Recommend barrier cream for perianal excoriations  - Dispo per ED [phone]

## 2024-11-29 NOTE — ED BEHAVIORAL HEALTH ASSESSMENT NOTE - NSSUICPROTFACT_PSY_ALL_CORE
Chief Complaint   Patient presents with    Dental Pain     Pt states that she's here r/t to dental pain. Pt states dental pain is located on upper right side. Pt states that the dental pain started three days ago, but got better and then got worse again today. RN notes swelling on the right side.  
Responsibility to children, family, or others/Identifies reasons for living/Supportive social network of family or friends/Fear of death or the actual act of killing self/Engaged in work or school/Positive therapeutic relationships

## 2025-01-03 ENCOUNTER — INPATIENT (INPATIENT)
Facility: HOSPITAL | Age: 40
LOS: 2 days | Discharge: HOME | DRG: 387 | End: 2025-01-06
Attending: INTERNAL MEDICINE | Admitting: INTERNAL MEDICINE
Payer: COMMERCIAL

## 2025-01-03 VITALS
HEART RATE: 113 BPM | DIASTOLIC BLOOD PRESSURE: 72 MMHG | RESPIRATION RATE: 18 BRPM | WEIGHT: 220.02 LBS | OXYGEN SATURATION: 100 % | TEMPERATURE: 99 F | SYSTOLIC BLOOD PRESSURE: 117 MMHG | HEIGHT: 76 IN

## 2025-01-03 DIAGNOSIS — K51.90 ULCERATIVE COLITIS, UNSPECIFIED, WITHOUT COMPLICATIONS: ICD-10-CM

## 2025-01-03 PROBLEM — G91.9 HYDROCEPHALUS, UNSPECIFIED: Chronic | Status: ACTIVE | Noted: 2024-04-06

## 2025-01-03 LAB
ALBUMIN SERPL ELPH-MCNC: 2.9 G/DL — LOW (ref 3.3–5)
ALP SERPL-CCNC: 148 U/L — HIGH (ref 40–120)
ALT FLD-CCNC: 27 U/L — SIGNIFICANT CHANGE UP (ref 12–78)
ANION GAP SERPL CALC-SCNC: 8 MMOL/L — SIGNIFICANT CHANGE UP (ref 5–17)
APTT BLD: 29.2 SEC — SIGNIFICANT CHANGE UP (ref 24.5–35.6)
AST SERPL-CCNC: 19 U/L — SIGNIFICANT CHANGE UP (ref 15–37)
BASOPHILS # BLD AUTO: 0 K/UL — SIGNIFICANT CHANGE UP (ref 0–0.2)
BASOPHILS NFR BLD AUTO: 0 % — SIGNIFICANT CHANGE UP (ref 0–2)
BILIRUB SERPL-MCNC: 1.5 MG/DL — HIGH (ref 0.2–1.2)
BUN SERPL-MCNC: 9 MG/DL — SIGNIFICANT CHANGE UP (ref 7–23)
CALCIUM SERPL-MCNC: 9.3 MG/DL — SIGNIFICANT CHANGE UP (ref 8.5–10.1)
CHLORIDE SERPL-SCNC: 100 MMOL/L — SIGNIFICANT CHANGE UP (ref 96–108)
CO2 SERPL-SCNC: 24 MMOL/L — SIGNIFICANT CHANGE UP (ref 22–31)
CREAT SERPL-MCNC: 1 MG/DL — SIGNIFICANT CHANGE UP (ref 0.5–1.3)
EGFR: 99 ML/MIN/1.73M2 — SIGNIFICANT CHANGE UP
EOSINOPHIL # BLD AUTO: 0 K/UL — SIGNIFICANT CHANGE UP (ref 0–0.5)
EOSINOPHIL NFR BLD AUTO: 0 % — SIGNIFICANT CHANGE UP (ref 0–6)
FLUAV AG NPH QL: SIGNIFICANT CHANGE UP
FLUBV AG NPH QL: SIGNIFICANT CHANGE UP
GLUCOSE SERPL-MCNC: 111 MG/DL — HIGH (ref 70–99)
HCT VFR BLD CALC: 36.1 % — LOW (ref 39–50)
HGB BLD-MCNC: 11.2 G/DL — LOW (ref 13–17)
INR BLD: 1.14 RATIO — SIGNIFICANT CHANGE UP (ref 0.85–1.16)
LACTATE SERPL-SCNC: 2 MMOL/L — SIGNIFICANT CHANGE UP (ref 0.7–2)
LIDOCAIN IGE QN: 12 U/L — LOW (ref 13–75)
LYMPHOCYTES # BLD AUTO: 1.78 K/UL — SIGNIFICANT CHANGE UP (ref 1–3.3)
LYMPHOCYTES # BLD AUTO: 11 % — LOW (ref 13–44)
MCHC RBC-ENTMCNC: 23 PG — LOW (ref 27–34)
MCHC RBC-ENTMCNC: 31 G/DL — LOW (ref 32–36)
MCV RBC AUTO: 74.1 FL — LOW (ref 80–100)
MONOCYTES # BLD AUTO: 1.78 K/UL — HIGH (ref 0–0.9)
MONOCYTES NFR BLD AUTO: 11 % — SIGNIFICANT CHANGE UP (ref 2–14)
NEUTROPHILS # BLD AUTO: 12.17 K/UL — HIGH (ref 1.8–7.4)
NEUTROPHILS NFR BLD AUTO: 75 % — SIGNIFICANT CHANGE UP (ref 43–77)
NRBC # BLD: SIGNIFICANT CHANGE UP /100 WBCS (ref 0–0)
PLATELET # BLD AUTO: 571 K/UL — HIGH (ref 150–400)
POTASSIUM SERPL-MCNC: 4.1 MMOL/L — SIGNIFICANT CHANGE UP (ref 3.5–5.3)
POTASSIUM SERPL-SCNC: 4.1 MMOL/L — SIGNIFICANT CHANGE UP (ref 3.5–5.3)
PROT SERPL-MCNC: 7.8 G/DL — SIGNIFICANT CHANGE UP (ref 6–8.3)
PROTHROM AB SERPL-ACNC: 13.3 SEC — SIGNIFICANT CHANGE UP (ref 9.9–13.4)
RBC # BLD: 4.87 M/UL — SIGNIFICANT CHANGE UP (ref 4.2–5.8)
RBC # FLD: 16.1 % — HIGH (ref 10.3–14.5)
RSV RNA NPH QL NAA+NON-PROBE: SIGNIFICANT CHANGE UP
SARS-COV-2 RNA SPEC QL NAA+PROBE: SIGNIFICANT CHANGE UP
SODIUM SERPL-SCNC: 132 MMOL/L — LOW (ref 135–145)
WBC # BLD: 16.22 K/UL — HIGH (ref 3.8–10.5)
WBC # FLD AUTO: 16.22 K/UL — HIGH (ref 3.8–10.5)

## 2025-01-03 PROCEDURE — 74177 CT ABD & PELVIS W/CONTRAST: CPT | Mod: 26,MC

## 2025-01-03 PROCEDURE — 99285 EMERGENCY DEPT VISIT HI MDM: CPT

## 2025-01-03 RX ORDER — PIPERACILLIN AND TAZOBACTAM 3; .375 G/15ML; G/15ML
3.38 INJECTION, POWDER, LYOPHILIZED, FOR SOLUTION INTRAVENOUS ONCE
Refills: 0 | Status: COMPLETED | OUTPATIENT
Start: 2025-01-03 | End: 2025-01-03

## 2025-01-03 RX ORDER — ACETAMINOPHEN 80 MG/.8ML
650 SOLUTION/ DROPS ORAL EVERY 6 HOURS
Refills: 0 | Status: DISCONTINUED | OUTPATIENT
Start: 2025-01-03 | End: 2025-01-06

## 2025-01-03 RX ORDER — SODIUM CHLORIDE 9 MG/ML
1000 INJECTION, SOLUTION INTRAMUSCULAR; INTRAVENOUS; SUBCUTANEOUS
Refills: 0 | Status: DISCONTINUED | OUTPATIENT
Start: 2025-01-03 | End: 2025-01-05

## 2025-01-03 RX ORDER — MESALAMINE 375 MG/1
1000 CAPSULE, EXTENDED RELEASE ORAL DAILY
Refills: 0 | Status: DISCONTINUED | OUTPATIENT
Start: 2025-01-03 | End: 2025-01-06

## 2025-01-03 RX ORDER — MAG HYDROX/ALUMINUM HYD/SIMETH 200-200-20
30 SUSPENSION, ORAL (FINAL DOSE FORM) ORAL EVERY 4 HOURS
Refills: 0 | Status: DISCONTINUED | OUTPATIENT
Start: 2025-01-03 | End: 2025-01-06

## 2025-01-03 RX ORDER — SODIUM CHLORIDE 9 MG/ML
2700 INJECTION, SOLUTION INTRAMUSCULAR; INTRAVENOUS; SUBCUTANEOUS ONCE
Refills: 0 | Status: COMPLETED | OUTPATIENT
Start: 2025-01-03 | End: 2025-01-03

## 2025-01-03 RX ORDER — PIPERACILLIN AND TAZOBACTAM 3; .375 G/15ML; G/15ML
3.38 INJECTION, POWDER, LYOPHILIZED, FOR SOLUTION INTRAVENOUS EVERY 8 HOURS
Refills: 0 | Status: DISCONTINUED | OUTPATIENT
Start: 2025-01-03 | End: 2025-01-06

## 2025-01-03 RX ORDER — ONDANSETRON 4 MG/1
4 TABLET ORAL EVERY 6 HOURS
Refills: 0 | Status: DISCONTINUED | OUTPATIENT
Start: 2025-01-03 | End: 2025-01-06

## 2025-01-03 RX ORDER — GINKGO BILOBA 40 MG
3 CAPSULE ORAL AT BEDTIME
Refills: 0 | Status: DISCONTINUED | OUTPATIENT
Start: 2025-01-03 | End: 2025-01-06

## 2025-01-03 RX ADMIN — SODIUM CHLORIDE 100 MILLILITER(S): 9 INJECTION, SOLUTION INTRAMUSCULAR; INTRAVENOUS; SUBCUTANEOUS at 20:55

## 2025-01-03 RX ADMIN — SODIUM CHLORIDE 2700 MILLILITER(S): 9 INJECTION, SOLUTION INTRAMUSCULAR; INTRAVENOUS; SUBCUTANEOUS at 18:30

## 2025-01-03 RX ADMIN — PIPERACILLIN AND TAZOBACTAM 25 GRAM(S): 3; .375 INJECTION, POWDER, LYOPHILIZED, FOR SOLUTION INTRAVENOUS at 20:55

## 2025-01-03 RX ADMIN — PIPERACILLIN AND TAZOBACTAM 200 GRAM(S): 3; .375 INJECTION, POWDER, LYOPHILIZED, FOR SOLUTION INTRAVENOUS at 16:47

## 2025-01-03 RX ADMIN — SODIUM CHLORIDE 2700 MILLILITER(S): 9 INJECTION, SOLUTION INTRAMUSCULAR; INTRAVENOUS; SUBCUTANEOUS at 16:46

## 2025-01-03 NOTE — CONSULT NOTE ADULT - ASSESSMENT
38 yo male with ulcerative colitis flare up  -No evidence of clinical appendicitis, fluid around cecum likely related to UC  -GI consult  -stool test
h/o UC  abdominal pain  diarrhea    await labs  if renal fn normal will need ct with IV contrast  there has been some recent medication non-compliance  will need GI pcr and c diff to be sent 
38 y/o m with pmh UC and hydrocephalus who presents with a several day history of abdominal pain, nausea, bloody stools consistent with a UC flair. In the ED he is found to have a leukocytosis to 16.2 and CT scan findings showing cecal inflammation with inflammation of the base of the appendix with a normal appearing appendiceal tip. These findings are favored to be related to his UC and not appendiceal in nature.    Plan:  -No acute surgical intervention  -Findings favored to be related to UC and not appendicitis  -Medical management of UC per primary team  -Patient can follow up with Dr. Loyd outpatient to establish relationship for Ulcerative Colitis if he desires  -Please do not hesitate to reach out to surgery team with any other concerns

## 2025-01-03 NOTE — ED PROVIDER NOTE - OBJECTIVE STATEMENT
39-year-old male with past medical history of ulcerative colitis presents today due to abdominal pain for the last few days.  Patient reports that he missed a weeks worth of his ulcerative colitis medications which she has now experiencing abdominal pain, bloody stools, diarrhea, fatigue. pt takes Pentaza and Rinvoq. Patient denies chest pain, shortness of breath, dysuria, hematuria, prior abdominal surgery, or any other complaints.  Gastroenterologist– Torres

## 2025-01-03 NOTE — ED PROVIDER NOTE - NS_BEDUNITTYPES_ED_ALL_ED
[facility] PREOPERATIVE PATIENT INSTRUCTIONS     PARKING:     [facility]: Parking is available I [address] on [address] and [facility]. Pedestrian Walkway bridge is located on the 2nd floor of [address]ing [address].  is also available at main entrance of Outpatient [facility] on [address]       GENERAL INSTRUCTIONS:    • TWO family members/friend who is over the age of 18 may accompany you. A responsible person MUST accompany you home and stay with you the first 24 hours after surgery. Your surgery will be cancelled if these arrangements have not been made.   • If you become ill prior to surgery (I.e. fever, vomiting), please notify your surgeon immediately.   • Children under 12 years old MUST have a parent with them at all times.   • ON DAY OF SURGERY: Do not wear contact lenses, make up, nail polish or jewelry. Leave all valuables at home except what you will need or are instructed to bring.   • For your convenience, [facility] pharmacy is available to fill medications. Please bring a form of payments accepted at [facility] locations.       BRING WITH YOU ON DAY OF SURGERY:  1. Insurance Card and referral (if required), 's license or photo ID  2. All your medications in their original pharmacy bottles  3. Advance directive (living will, Healthcare Power of )  4. All information on your pacemaker or AICD, if appropriate  5. Any other forms, x-rays or films the doctor may have given you  6. Any medical devices (I.e. crutches, braces, sling)  7. Loose fitting clothing, slippers, walking shoes if applicable  8. For comfort measures, you may bring headphones/music device/magazines that can be given to family when you go into the operating room  9. Favorite toy of blanket for children. Formula for feeding after surgery or favorite sippy cup.   10. Please be aware that there are 2 different locations. Hospital patients please arrive at Entrance F on [address] &  [facility] and check in with .         [facility] patients please check in with  Desk right off the 2nd floor Pedestrian walkway entrance.     NPO/Eating/Drinking Restrictions: Please note: If these guidelines are not followed, your procedure will be delayed and possibly cancelled.  For cases starting on or after 3 pm, please contact the anesthesia department regarding NPO status.    Do NOT eat or consume any food or dairy after midnight.  This includes candy or gum.    Acceptable clear liquids can be given up until 1 hour prior to arrival time given.  **Acceptable clear liquids: water, high-carbohydrate drink (Ensure Clear Pre-Surgical Drink, Propel; or Clear non-colored Gatorade), apple juice without fiber (non-organic), Pedialyte, 7-up/Sprite, black coffee or tea without milk products or lemon  Unacceptable clear liquids: Coffee or tea with milk products, orange juice, alcohol, soup broth    Do NOT smoke, drink alcohol, or use recreational drugs prior to your surgery.      Please call the location of surgery with questions: [facility] (up to  6 PM) [phone]; Hospital (up to 7 PM) [phone].   After hours for both locations: [phone]    This information has been reviewed with the patient's mother \"Paulino\" on 03/15/23 2:00 PM.      MED/SURG

## 2025-01-03 NOTE — ED PROVIDER NOTE - PHYSICAL EXAMINATION

## 2025-01-03 NOTE — ED ADULT TRIAGE NOTE - CHIEF COMPLAINT QUOTE
Hx ulcerative colitis, has not taken meds in 1 month. Complains of diarrhea, abd cramping, and decreased appetite x5 days. States last urination was 1am this morning.

## 2025-01-03 NOTE — ED PROVIDER NOTE - CLINICAL SUMMARY MEDICAL DECISION MAKING FREE TEXT BOX
Patient is a 39-year-old white male with history of ulcerative colitis who presents no to the emergency department at [facility] with few days of worsening abdominal pain but has had chronic lower abdominal pain now for the past few weeks with diarrhea scant blood in stool.  No shortness of breath no chest pain no back pain.  This case will require thorough evaluation to be performed in an independent manner along with labs CBC chemistries CAT scan of the abdomen pelvis with IV fluids.

## 2025-01-03 NOTE — ED ADULT TRIAGE NOTE - HEIGHT IN FEET
Please notify  of results.  The pathology results demonstrated Hyperplastic and Tubular adenoma.  Follow up in 5 years given only 2 adenomatous polyps. Sooner if new symptoms or change in family history.     EGD biopsies showed reflux changes in distal esophagus but were otherwise normal. Recommend clinic follow up in around 2 months to assess response to PPI and discuss adjust medication based on response.   
6

## 2025-01-03 NOTE — H&P ADULT - HISTORY OF PRESENT ILLNESS
This is a 39-year-old male with past medical history of ulcerative colitis presents today due to abdominal pain for the last few days.  Patient reports that he missed a weeks worth of his ulcerative colitis medications which she has now experiencing abdominal pain, bloody stools, diarrhea, fatigue. pt takes Pentaza and Rinvoq. Patient denies chest pain, shortness of breath, dysuria, hematuria, prior abdominal surgery, or any other complaints.

## 2025-01-03 NOTE — H&P ADULT - PROBLEM SELECTOR PLAN 1
Admit  Pan-culture  Check GI PCR stool, stool for C diff  Start iv Zosyn   Clear liquid diet  Trend WBC  Check ESR, CRP  IF patient's  C diff and GI PCR are negative, will consider starting iv solumedrol 20mg ivp q8h  GI consult  Surgical consult to r/o appendicitis  Further work-up/management pending clinical course.

## 2025-01-03 NOTE — CONSULT NOTE ADULT - SUBJECTIVE AND OBJECTIVE BOX
38 yo male h/o Ulcerative colitis diagnosed a year ago, on pentasa, tried humira in the past and was recently started on new medication which he missed last week, developed frequent bloody loose stool , twice today. Last colonoscopy last year by Dr William. CT Abd showed diffuse colitis with fluid around cecum, normal tip of appendix. Elevated WBC    "date of service"01-03-25 @ 18:08    HPI:      PAST MEDICAL & SURGICAL HISTORY:  Anxiety and depression    Ulcerative colitis      Hydrocephalus          01-03-25 @ 18:08  REVIEW OF SYSTEMS:    CONSTITUTIONAL: No weakness, fevers or chills  EYES/ENT: No visual changes;  No vertigo or throat pain   NECK: No pain or stiffness  RESPIRATORY: No cough, wheezing, hemoptysis; No shortness of breath  CARDIOVASCULAR: No chest pain or palpitations  GASTROINTESTINAL: No abdominal or epigastric pain. No nausea, vomiting, or hematemesis;  diarrhea  bloody stool.  GENITOURINARY: No dysuria, frequency or hematuria  NEUROLOGICAL: No numbness or weakness  SKIN: No itching, burning, rashes, or lesions   All other review of systems is negative unless indicated above.    MEDICATIONS  (STANDING):    MEDICATIONS  (PRN):      Allergies    No Known Allergies    Intolerances        SOCIAL HISTORY: non smoker    FAMILY HISTORY: non cotributory      Vital Signs Last 24 Hrs  T(C): 37.3 (03 Jan 2025 12:55), Max: 37.3 (03 Jan 2025 12:55)  T(F): 99.1 (03 Jan 2025 12:55), Max: 99.1 (03 Jan 2025 12:55)  HR: 113 (03 Jan 2025 12:55) (113 - 113)  BP: 117/72 (03 Jan 2025 12:55) (117/72 - 117/72)  BP(mean): --  RR: 18 (03 Jan 2025 12:55) (18 - 18)  SpO2: 100% (03 Jan 2025 12:55) (100% - 100%)    Parameters below as of 03 Jan 2025 12:55  Patient On (Oxygen Delivery Method): room air        .01-03-25 @ 18:08  VITAL SIGNS:  T(C): 37.3 (01-03-25 @ 12:55), Max: 37.3 (01-03-25 @ 12:55)  T(F): 99.1 (01-03-25 @ 12:55), Max: 99.1 (01-03-25 @ 12:55)  HR: 113 (01-03-25 @ 12:55) (113 - 113)  BP: 117/72 (01-03-25 @ 12:55) (117/72 - 117/72)  BP(mean): --  RR: 18 (01-03-25 @ 12:55) (18 - 18)  SpO2: 100% (01-03-25 @ 12:55) (100% - 100%)  Wt(kg): --    PHYSICAL EXAM:    Constitutional: resting comfortably in bed; NAD  Eyes: PERRL, EOMI, anicteric sclera  ENT: no nasal discharge; uvula midline, no oropharyngeal erythema or exudates  Neck: supple; no JVD or thyromegaly  Respiratory: CTA B/L; no W/R/R, no retractions  Cardiac: +S1/S2; RRR; no murmurs  Gastrointestinal: soft, NT/ND; no rebound or guarding; +BSx4  Back: spine midline, no bony tenderness or step-offs; no CVAT B/L  Extremities: no clubbing or cyanosis; no peripheral edema  Musculoskeletal: NROM x4; no joint swelling, tenderness or erythema  Vascular: 2+ radial, femoral, DP/PT pulses B/L  Dermatologic: skin warm, dry and intact; no rashes, wounds, or scars  Lymphatic: no submandibular or cervical LAD  Neurologic: AAOx3; CNII-XII grossly intact; no focal deficits  Psychiatric: affect and characteristics of appearance, verbalizations, behaviors are appropriate    LABS:                        11.2   16.22 )-----------( 571      ( 03 Jan 2025 15:00 )             36.1       01-03    132[L]  |  100  |  9   ----------------------------<  111[H]  4.1   |  24  |  1.00    Ca    9.3      03 Jan 2025 15:00    TPro  7.8  /  Alb  2.9[L]  /  TBili  1.5[H]  /  DBili  x   /  AST  19  /  ALT  27  /  AlkPhos  148[H]  01-03      PT/INR - ( 03 Jan 2025 15:00 )   PT: 13.3 sec;   INR: 1.14 ratio         PTT - ( 03 Jan 2025 15:00 )  PTT:29.2 sec    Urinalysis Basic - ( 03 Jan 2025 15:00 )    Color: x / Appearance: x / SG: x / pH: x  Gluc: 111 mg/dL / Ketone: x  / Bili: x / Urobili: x   Blood: x / Protein: x / Nitrite: x   Leuk Esterase: x / RBC: x / WBC x   Sq Epi: x / Non Sq Epi: x / Bacteria: x        RADIOLOGY & ADDITIONAL STUDIES:  < from: CT Abdomen and Pelvis w/ IV Cont (01.03.25 @ 16:27) >    IMPRESSION:    Diffuse colonic thickening consistent with ulcerative colitis. Interval   development of marked pericecal inflammation and fluid. The base of the   appendix is thickened secondary to inflammation however, the tip of the   appendix is normal. All of these findings are favored to be related to   acute cecal inflammation rather than appendicitis.    < end of copied text >  
SURGERY PA CONSULT NOTE:    CHIEF COMPLAINT:  Patient is a 39y old  Male who presents with a chief complaint of abdominal pain    HPI FROM ED:  HPI: 38 y/o male with PMH Ulcerative colitis diagnosed a year ago and hydrocephalus who presents with a several week history of abdominal pain that he says is similar to the UC pain he has experienced in the past. For the past few days he has had worsening abdominal pain, nausea, bloody stools and diarrhea. He states that he has been non-compliant with his UC medications recently, which include Pentasa 500 mg and Rinvoq 45 mg. He states that he was first diagnosed with UC around a year ago and his symptoms have never fully gone away in that time. He vapes, does not drink and denies other drug use.      PAST MEDICAL HISTORY:  PAST MEDICAL & SURGICAL HISTORY:  Anxiety and depression      Hydrocephalus          PAST SURGICAL HISTORY:    REVIEW OF SYSTEMS:  General/Constitutional: No acute distress, no headache, weakness, fevers, or chills   Respiratory: Denies cough/hemoptysis, denies wheezing/SOB/dyspnea  Cardiac: Denies chest pain, palpitations  Abdomen: Endorses nausea, abdominal pain.  Extremities: Denies sores, swelling, discoloration bilat UE/LE  Genitourinary: Denies urinary issues or complaints, denies dysuria/hematuria  Skin: Denies pruritus, pain, rashes      MEDICATIONS:  Home Medications:    MEDICATIONS  (STANDING):    MEDICATIONS  (PRN):      ALLERGIES:  Allergies    No Known Allergies    SOCIAL HISTORY:  Social History:    Smoking: Yes [ ]  No [x ]   ______pk yrs  ETOH  recovering alcoholic, last relapse several months ago  DRUGS:  Yes [ ]  No [ x]  if so what______________    FAMILY HISTORY:  FAMILY HISTORY:      VITAL SIGNS:  Vital Signs Last 24 Hrs  T(C): 37.3 (03 Jan 2025 12:55), Max: 37.3 (03 Jan 2025 12:55)  T(F): 99.1 (03 Jan 2025 12:55), Max: 99.1 (03 Jan 2025 12:55)  HR: 113 (03 Jan 2025 12:55) (113 - 113)  BP: 117/72 (03 Jan 2025 12:55) (117/72 - 117/72)  BP(mean): --  RR: 18 (03 Jan 2025 12:55) (18 - 18)  SpO2: 100% (03 Jan 2025 12:55) (100% - 100%)    Parameters below as of 03 Jan 2025 12:55  Patient On (Oxygen Delivery Method): room air        PHYSICAL EXAM:  General: No acute distress, appears comfortable, well-groomed, appears stated age  Head, Eyes, Ears, Nose, Throat: Normal cephalic/atraumatic, anicteric, conjunctiva-non injected and moist, vision grossly intact, hearing grossly intact, no nasal discharge, ears and nose symmetrical and atraumatic.  Chest: Equal chest rise bilaterally, no increased work of breathing  Heart: RRR  Abdomen: Soft, non-distended. Moderate tenderness to palpation in the RLQ. Voluntary guarding, no rebound or signs of peritonitis.  Extremity: No swelling, or open sores, no gross deformities,  Skin: Good color, turgor, texture with no gross lesions, no eruptions, no rashes, no subcutaneous nodules and normal temperature.     LABS:                        11.2   16.22 )-----------( 571      ( 03 Jan 2025 15:00 )             36.1     01-03    132[L]  |  100  |  9   ----------------------------<  111[H]  4.1   |  24  |  1.00    Ca    9.3      03 Jan 2025 15:00    TPro  7.8  /  Alb  2.9[L]  /  TBili  1.5[H]  /  DBili  x   /  AST  19  /  ALT  27  /  AlkPhos  148[H]  01-03    PT/INR - ( 03 Jan 2025 15:00 )   PT: 13.3 sec;   INR: 1.14 ratio         PTT - ( 03 Jan 2025 15:00 )  PTT:29.2 sec  Urinalysis Basic - ( 03 Jan 2025 15:00 )    Color: x / Appearance: x / SG: x / pH: x  Gluc: 111 mg/dL / Ketone: x  / Bili: x / Urobili: x   Blood: x / Protein: x / Nitrite: x   Leuk Esterase: x / RBC: x / WBC x   Sq Epi: x / Non Sq Epi: x / Bacteria: x      LIVER FUNCTIONS - ( 03 Jan 2025 15:00 )  Alb: 2.9 g/dL / Pro: 7.8 g/dL / ALK PHOS: 148 U/L / ALT: 27 U/L / AST: 19 U/L / GGT: x               RADIOLOGY & ADDITIONAL STUDIES:    ASSESSMENT:    PLAN:
[facility]    Syed Stewart Alegre NP    [address] [place] [address] [phone]      Chief Complaint:  Patient is a 39y old  Male who presents with a chief complaint of     HPI: 39-year-old male with past medical history of ulcerative colitis presents today due to abdominal pain for the last few days.  Patient reports that he missed a weeks worth of his ulcerative colitis medications which she has now experiencing abdominal pain, bloody stools, diarrhea, fatigue. pt takes Pentaza and Rinvoq. Patient denies chest pain, shortness of breath, dysuria, hematuria, prior abdominal surgery, or any other complaints.    Allergies:  No Known Allergies      Medications:  sodium chloride 0.9% Bolus 2700 milliLiter(s) IV Bolus once      PMHX/PSHX:  Anxiety and depression    Hydrocephalus    No significant past surgical history        Family history:      Social History:     ROS:     General:  No wt loss, fevers, chills, night sweats, fatigue,   Eyes:  Good vision, no reported pain  ENT:  No sore throat, pain, runny nose, dysphagia  CV:  No pain, palpitations, hypo/hypertension  Resp:  No dyspnea, cough, tachypnea, wheezing  GI:  + pain, No nausea, No vomiting, + diarrhea, No constipation, No weight loss, No fever, No pruritis, No rectal bleeding, No tarry stools, No dysphagia,  :  No pain, bleeding, incontinence, nocturia  Muscle:  No pain, weakness  Neuro:  No weakness, tingling, memory problems  Psych:  No fatigue, insomnia, mood problems, depression  Endocrine:  No polyuria, polydipsia, cold/heat intolerance  Heme:  No petechiae, ecchymosis, easy bruisability  Skin:  No rash, tattoos, scars, edema      PHYSICAL EXAM:   Vital Signs:  Vital Signs Last 24 Hrs  T(C): 37.3 (03 Jan 2025 12:55), Max: 37.3 (03 Jan 2025 12:55)  T(F): 99.1 (03 Jan 2025 12:55), Max: 99.1 (03 Jan 2025 12:55)  HR: 113 (03 Jan 2025 12:55) (113 - 113)  BP: 117/72 (03 Jan 2025 12:55) (117/72 - 117/72)  BP(mean): --  RR: 18 (03 Jan 2025 12:55) (18 - 18)  SpO2: 100% (03 Jan 2025 12:55) (100% - 100%)    Parameters below as of 03 Jan 2025 12:55  Patient On (Oxygen Delivery Method): room air      Daily Height in cm: 193.04 (03 Jan 2025 12:55)    Daily     GENERAL:  Appears stated age, well-groomed, well-nourished, no distress  HEENT:  NC/AT,  conjunctivae clear and pink, no thyromegaly, nodules, adenopathy, no JVD, sclera -anicteric  CHEST:  Full & symmetric excursion, no increased effort, breath sounds clear  HEART:  Regular rhythm, S1, S2, no murmur/rub/S3/S4, no abdominal bruit, no edema  ABDOMEN:  Soft, non-tender, non-distended, normoactive bowel sounds,  no masses ,no hepato-splenomegaly, no signs of chronic liver disease  EXTEREMITIES:  no cyanosis,clubbing or edema  SKIN:  No rash/erythema/ecchymoses/petechiae/wounds/abscess/warm/dry  NEURO:  Alert, oriented, no asterixis, no tremor, no encephalopathy    LABS:                    Imaging:

## 2025-01-03 NOTE — ED PROVIDER NOTE - DIFFERENTIAL DIAGNOSIS
Abdominal pain rule out flare of ulcerative colitis rule out colitis rule out intra-abdominal abscess rule out fistula rule out perforation Differential Diagnosis

## 2025-01-03 NOTE — ED PROVIDER NOTE - ATTENDING APP SHARED VISIT CONTRIBUTION OF CARE
Examination reveals a well-developed well-nourished pleasant white male in mild distress secondary to abdominal pain with clear lungs normal heart sounds and abdomen is slightly distended with diffusely tender to deep palpation with out any rebound or guarding.  I agree with plan and management outlined by PA.

## 2025-01-04 DIAGNOSIS — A08.0 ROTAVIRAL ENTERITIS: ICD-10-CM

## 2025-01-04 LAB
ALBUMIN SERPL ELPH-MCNC: 2.3 G/DL — LOW (ref 3.3–5)
ALP SERPL-CCNC: 115 U/L — SIGNIFICANT CHANGE UP (ref 40–120)
ALT FLD-CCNC: 23 U/L — SIGNIFICANT CHANGE UP (ref 12–78)
ANION GAP SERPL CALC-SCNC: 8 MMOL/L — SIGNIFICANT CHANGE UP (ref 5–17)
APPEARANCE UR: CLEAR — SIGNIFICANT CHANGE UP
AST SERPL-CCNC: 10 U/L — LOW (ref 15–37)
BASOPHILS # BLD AUTO: 0.05 K/UL — SIGNIFICANT CHANGE UP (ref 0–0.2)
BASOPHILS NFR BLD AUTO: 0.4 % — SIGNIFICANT CHANGE UP (ref 0–2)
BILIRUB SERPL-MCNC: 0.9 MG/DL — SIGNIFICANT CHANGE UP (ref 0.2–1.2)
BILIRUB UR-MCNC: NEGATIVE — SIGNIFICANT CHANGE UP
BUN SERPL-MCNC: 5 MG/DL — LOW (ref 7–23)
C DIFF GDH STL QL: NEGATIVE — SIGNIFICANT CHANGE UP
C DIFF GDH STL QL: SIGNIFICANT CHANGE UP
CALCIUM SERPL-MCNC: 8.6 MG/DL — SIGNIFICANT CHANGE UP (ref 8.5–10.1)
CHLORIDE SERPL-SCNC: 104 MMOL/L — SIGNIFICANT CHANGE UP (ref 96–108)
CO2 SERPL-SCNC: 25 MMOL/L — SIGNIFICANT CHANGE UP (ref 22–31)
COLOR SPEC: YELLOW — SIGNIFICANT CHANGE UP
CREAT SERPL-MCNC: 0.87 MG/DL — SIGNIFICANT CHANGE UP (ref 0.5–1.3)
CRP SERPL-MCNC: 160 MG/L — HIGH
DIFF PNL FLD: NEGATIVE — SIGNIFICANT CHANGE UP
EGFR: 114 ML/MIN/1.73M2 — SIGNIFICANT CHANGE UP
EOSINOPHIL # BLD AUTO: 0.13 K/UL — SIGNIFICANT CHANGE UP (ref 0–0.5)
EOSINOPHIL NFR BLD AUTO: 1.1 % — SIGNIFICANT CHANGE UP (ref 0–6)
ERYTHROCYTE [SEDIMENTATION RATE] IN BLOOD: 20 MM/HR — HIGH (ref 0–15)
GI PCR PANEL: DETECTED
GLUCOSE SERPL-MCNC: 102 MG/DL — HIGH (ref 70–99)
GLUCOSE UR QL: NEGATIVE MG/DL — SIGNIFICANT CHANGE UP
HCT VFR BLD CALC: 28.6 % — LOW (ref 39–50)
HGB BLD-MCNC: 8.8 G/DL — LOW (ref 13–17)
IMM GRANULOCYTES NFR BLD AUTO: 0.4 % — SIGNIFICANT CHANGE UP (ref 0–0.9)
KETONES UR-MCNC: 15 MG/DL
LEUKOCYTE ESTERASE UR-ACNC: NEGATIVE — SIGNIFICANT CHANGE UP
LYMPHOCYTES # BLD AUTO: 1.21 K/UL — SIGNIFICANT CHANGE UP (ref 1–3.3)
LYMPHOCYTES # BLD AUTO: 9.8 % — LOW (ref 13–44)
MAGNESIUM SERPL-MCNC: 2.1 MG/DL — SIGNIFICANT CHANGE UP (ref 1.6–2.6)
MCHC RBC-ENTMCNC: 23.1 PG — LOW (ref 27–34)
MCHC RBC-ENTMCNC: 30.8 G/DL — LOW (ref 32–36)
MCV RBC AUTO: 75.1 FL — LOW (ref 80–100)
MONOCYTES # BLD AUTO: 1.69 K/UL — HIGH (ref 0–0.9)
MONOCYTES NFR BLD AUTO: 13.7 % — SIGNIFICANT CHANGE UP (ref 2–14)
NEUTROPHILS # BLD AUTO: 9.21 K/UL — HIGH (ref 1.8–7.4)
NEUTROPHILS NFR BLD AUTO: 74.6 % — SIGNIFICANT CHANGE UP (ref 43–77)
NITRITE UR-MCNC: NEGATIVE — SIGNIFICANT CHANGE UP
NRBC # BLD: 0 /100 WBCS — SIGNIFICANT CHANGE UP (ref 0–0)
PH UR: 5 — SIGNIFICANT CHANGE UP (ref 5–8)
PLATELET # BLD AUTO: 394 K/UL — SIGNIFICANT CHANGE UP (ref 150–400)
POTASSIUM SERPL-MCNC: 4.1 MMOL/L — SIGNIFICANT CHANGE UP (ref 3.5–5.3)
POTASSIUM SERPL-SCNC: 4.1 MMOL/L — SIGNIFICANT CHANGE UP (ref 3.5–5.3)
PROCALCITONIN SERPL-MCNC: 0.1 NG/ML — HIGH
PROT SERPL-MCNC: 6.2 G/DL — SIGNIFICANT CHANGE UP (ref 6–8.3)
PROT UR-MCNC: 30 MG/DL
RBC # BLD: 3.81 M/UL — LOW (ref 4.2–5.8)
RBC # FLD: 16.3 % — HIGH (ref 10.3–14.5)
RV RNA STL NAA+PROBE: DETECTED
SODIUM SERPL-SCNC: 137 MMOL/L — SIGNIFICANT CHANGE UP (ref 135–145)
SP GR SPEC: 1.08 — HIGH (ref 1–1.03)
UROBILINOGEN FLD QL: 1 MG/DL — SIGNIFICANT CHANGE UP (ref 0.2–1)
WBC # BLD: 12.34 K/UL — HIGH (ref 3.8–10.5)
WBC # FLD AUTO: 12.34 K/UL — HIGH (ref 3.8–10.5)

## 2025-01-04 RX ORDER — METHYLPREDNISOLONE 4 MG/1
20 TABLET ORAL THREE TIMES A DAY
Refills: 0 | Status: DISCONTINUED | OUTPATIENT
Start: 2025-01-04 | End: 2025-01-05

## 2025-01-04 RX ADMIN — SODIUM CHLORIDE 100 MILLILITER(S): 9 INJECTION, SOLUTION INTRAMUSCULAR; INTRAVENOUS; SUBCUTANEOUS at 20:00

## 2025-01-04 RX ADMIN — ACETAMINOPHEN 650 MILLIGRAM(S): 80 SOLUTION/ DROPS ORAL at 03:04

## 2025-01-04 RX ADMIN — Medication 3 MILLIGRAM(S): at 01:08

## 2025-01-04 RX ADMIN — ACETAMINOPHEN 650 MILLIGRAM(S): 80 SOLUTION/ DROPS ORAL at 01:07

## 2025-01-04 RX ADMIN — MESALAMINE 1000 MILLIGRAM(S): 375 CAPSULE, EXTENDED RELEASE ORAL at 12:28

## 2025-01-04 RX ADMIN — Medication 30 MILLILITER(S): at 01:11

## 2025-01-04 RX ADMIN — PIPERACILLIN AND TAZOBACTAM 25 GRAM(S): 3; .375 INJECTION, POWDER, LYOPHILIZED, FOR SOLUTION INTRAVENOUS at 05:23

## 2025-01-04 RX ADMIN — METHYLPREDNISOLONE 20 MILLIGRAM(S): 4 TABLET ORAL at 21:29

## 2025-01-04 RX ADMIN — PIPERACILLIN AND TAZOBACTAM 25 GRAM(S): 3; .375 INJECTION, POWDER, LYOPHILIZED, FOR SOLUTION INTRAVENOUS at 21:29

## 2025-01-04 RX ADMIN — METHYLPREDNISOLONE 20 MILLIGRAM(S): 4 TABLET ORAL at 14:21

## 2025-01-04 RX ADMIN — ONDANSETRON 4 MILLIGRAM(S): 4 TABLET ORAL at 05:49

## 2025-01-04 RX ADMIN — PIPERACILLIN AND TAZOBACTAM 25 GRAM(S): 3; .375 INJECTION, POWDER, LYOPHILIZED, FOR SOLUTION INTRAVENOUS at 12:28

## 2025-01-04 NOTE — PATIENT PROFILE ADULT - FALL HARM RISK - UNIVERSAL INTERVENTIONS
Bed in lowest position, wheels locked, appropriate side rails in place/Call bell, personal items and telephone in reach/Instruct patient to call for assistance before getting out of bed or chair/Non-slip footwear when patient is out of bed/Tucker to call system/Physically safe environment - no spills, clutter or unnecessary equipment/Purposeful Proactive Rounding/Room/bathroom lighting operational, light cord in reach

## 2025-01-04 NOTE — ED ADULT NURSE REASSESSMENT NOTE - NS ED NURSE REASSESS COMMENT FT1
pt stable no c/o voiced remained on contact isolation medicated as ordered awaiting bed assignment
Received care at 0730.  No change in gen status.  Await in pt bed
received pt from day shift admitted focolitis with possible cdiff no stool obtained as yet awaiting bed assignment

## 2025-01-04 NOTE — PROGRESS NOTE ADULT - PROBLEM SELECTOR PLAN 1
Continue iv Zosyn   Continue Pentasa  Clear liquid diet  Trend WBC  GI f/u  Surgical consult -- noted -- no appendicitis  Further work-up/management pending clinical course. Continue iv Zosyn   Continue Pentasa  Clear liquid diet -- advance diet as per GI  Start iv solumedrol 20mg q8h as per GI  Trend WBC  GI f/u  Surgical consult -- noted -- no appendicitis  Further work-up/management pending clinical course.

## 2025-01-05 LAB
ANION GAP SERPL CALC-SCNC: 7 MMOL/L — SIGNIFICANT CHANGE UP (ref 5–17)
BUN SERPL-MCNC: 5 MG/DL — LOW (ref 7–23)
CALCIUM SERPL-MCNC: 9.2 MG/DL — SIGNIFICANT CHANGE UP (ref 8.5–10.1)
CHLORIDE SERPL-SCNC: 108 MMOL/L — SIGNIFICANT CHANGE UP (ref 96–108)
CO2 SERPL-SCNC: 24 MMOL/L — SIGNIFICANT CHANGE UP (ref 22–31)
CREAT SERPL-MCNC: 0.8 MG/DL — SIGNIFICANT CHANGE UP (ref 0.5–1.3)
EGFR: 117 ML/MIN/1.73M2 — SIGNIFICANT CHANGE UP
GLUCOSE SERPL-MCNC: 144 MG/DL — HIGH (ref 70–99)
HCT VFR BLD CALC: 30.7 % — LOW (ref 39–50)
HGB BLD-MCNC: 9.3 G/DL — LOW (ref 13–17)
MAGNESIUM SERPL-MCNC: 2.2 MG/DL — SIGNIFICANT CHANGE UP (ref 1.6–2.6)
MCHC RBC-ENTMCNC: 22.9 PG — LOW (ref 27–34)
MCHC RBC-ENTMCNC: 30.3 G/DL — LOW (ref 32–36)
MCV RBC AUTO: 75.6 FL — LOW (ref 80–100)
NRBC # BLD: 0 /100 WBCS — SIGNIFICANT CHANGE UP (ref 0–0)
PLATELET # BLD AUTO: 453 K/UL — HIGH (ref 150–400)
POTASSIUM SERPL-MCNC: 4.2 MMOL/L — SIGNIFICANT CHANGE UP (ref 3.5–5.3)
POTASSIUM SERPL-SCNC: 4.2 MMOL/L — SIGNIFICANT CHANGE UP (ref 3.5–5.3)
RBC # BLD: 4.06 M/UL — LOW (ref 4.2–5.8)
RBC # FLD: 16.2 % — HIGH (ref 10.3–14.5)
SODIUM SERPL-SCNC: 139 MMOL/L — SIGNIFICANT CHANGE UP (ref 135–145)
WBC # BLD: 11.68 K/UL — HIGH (ref 3.8–10.5)
WBC # FLD AUTO: 11.68 K/UL — HIGH (ref 3.8–10.5)

## 2025-01-05 RX ORDER — PREDNISONE 5 MG
20 TABLET ORAL DAILY
Refills: 0 | Status: CANCELLED | OUTPATIENT
Start: 2025-01-20 | End: 2025-01-06

## 2025-01-05 RX ORDER — PREDNISONE 5 MG
30 TABLET ORAL DAILY
Refills: 0 | Status: CANCELLED | OUTPATIENT
Start: 2025-01-13 | End: 2025-01-06

## 2025-01-05 RX ORDER — PREDNISONE 5 MG
40 TABLET ORAL DAILY
Refills: 0 | Status: DISCONTINUED | OUTPATIENT
Start: 2025-01-06 | End: 2025-01-06

## 2025-01-05 RX ORDER — PREDNISONE 5 MG
10 TABLET ORAL DAILY
Refills: 0 | Status: CANCELLED | OUTPATIENT
Start: 2025-01-27 | End: 2025-01-06

## 2025-01-05 RX ORDER — METHYLPREDNISOLONE 4 MG/1
20 TABLET ORAL EVERY 8 HOURS
Refills: 0 | Status: COMPLETED | OUTPATIENT
Start: 2025-01-05 | End: 2025-01-05

## 2025-01-05 RX ADMIN — MESALAMINE 1000 MILLIGRAM(S): 375 CAPSULE, EXTENDED RELEASE ORAL at 11:42

## 2025-01-05 RX ADMIN — METHYLPREDNISOLONE 20 MILLIGRAM(S): 4 TABLET ORAL at 14:40

## 2025-01-05 RX ADMIN — METHYLPREDNISOLONE 20 MILLIGRAM(S): 4 TABLET ORAL at 05:30

## 2025-01-05 RX ADMIN — METHYLPREDNISOLONE 20 MILLIGRAM(S): 4 TABLET ORAL at 21:45

## 2025-01-05 RX ADMIN — PIPERACILLIN AND TAZOBACTAM 25 GRAM(S): 3; .375 INJECTION, POWDER, LYOPHILIZED, FOR SOLUTION INTRAVENOUS at 21:45

## 2025-01-05 RX ADMIN — PIPERACILLIN AND TAZOBACTAM 25 GRAM(S): 3; .375 INJECTION, POWDER, LYOPHILIZED, FOR SOLUTION INTRAVENOUS at 13:47

## 2025-01-05 RX ADMIN — PIPERACILLIN AND TAZOBACTAM 25 GRAM(S): 3; .375 INJECTION, POWDER, LYOPHILIZED, FOR SOLUTION INTRAVENOUS at 05:32

## 2025-01-05 NOTE — PROGRESS NOTE ADULT - PROBLEM SELECTOR PLAN 1
Continue iv Zosyn   Continue Pentasa  On clear liquid diet -- advance diet as per GI  Continue iv solumedrol 20mg q8h as per GI  Trend WBC  GI f/u  Surgical consult -- noted -- no appendicitis  Further work-up/management pending clinical course.

## 2025-01-06 ENCOUNTER — TRANSCRIPTION ENCOUNTER (OUTPATIENT)
Age: 40
End: 2025-01-06

## 2025-01-06 VITALS
DIASTOLIC BLOOD PRESSURE: 73 MMHG | RESPIRATION RATE: 18 BRPM | HEART RATE: 71 BPM | OXYGEN SATURATION: 100 % | TEMPERATURE: 98 F | SYSTOLIC BLOOD PRESSURE: 115 MMHG

## 2025-01-06 LAB
ANION GAP SERPL CALC-SCNC: 6 MMOL/L — SIGNIFICANT CHANGE UP (ref 5–17)
BUN SERPL-MCNC: 6 MG/DL — LOW (ref 7–23)
CALCIUM SERPL-MCNC: 9 MG/DL — SIGNIFICANT CHANGE UP (ref 8.5–10.1)
CHLORIDE SERPL-SCNC: 107 MMOL/L — SIGNIFICANT CHANGE UP (ref 96–108)
CO2 SERPL-SCNC: 26 MMOL/L — SIGNIFICANT CHANGE UP (ref 22–31)
CREAT SERPL-MCNC: 0.79 MG/DL — SIGNIFICANT CHANGE UP (ref 0.5–1.3)
EGFR: 117 ML/MIN/1.73M2 — SIGNIFICANT CHANGE UP
GLUCOSE SERPL-MCNC: 139 MG/DL — HIGH (ref 70–99)
HCT VFR BLD CALC: 28.1 % — LOW (ref 39–50)
HGB BLD-MCNC: 8.5 G/DL — LOW (ref 13–17)
MAGNESIUM SERPL-MCNC: 2.1 MG/DL — SIGNIFICANT CHANGE UP (ref 1.6–2.6)
MCHC RBC-ENTMCNC: 23.2 PG — LOW (ref 27–34)
MCHC RBC-ENTMCNC: 30.2 G/DL — LOW (ref 32–36)
MCV RBC AUTO: 76.8 FL — LOW (ref 80–100)
NRBC # BLD: 0 /100 WBCS — SIGNIFICANT CHANGE UP (ref 0–0)
PLATELET # BLD AUTO: 524 K/UL — HIGH (ref 150–400)
POTASSIUM SERPL-MCNC: 4.2 MMOL/L — SIGNIFICANT CHANGE UP (ref 3.5–5.3)
POTASSIUM SERPL-SCNC: 4.2 MMOL/L — SIGNIFICANT CHANGE UP (ref 3.5–5.3)
RBC # BLD: 3.66 M/UL — LOW (ref 4.2–5.8)
RBC # FLD: 16.3 % — HIGH (ref 10.3–14.5)
SODIUM SERPL-SCNC: 139 MMOL/L — SIGNIFICANT CHANGE UP (ref 135–145)
WBC # BLD: 12.31 K/UL — HIGH (ref 3.8–10.5)
WBC # FLD AUTO: 12.31 K/UL — HIGH (ref 3.8–10.5)

## 2025-01-06 PROCEDURE — 85730 THROMBOPLASTIN TIME PARTIAL: CPT

## 2025-01-06 PROCEDURE — 87449 NOS EACH ORGANISM AG IA: CPT

## 2025-01-06 PROCEDURE — 83690 ASSAY OF LIPASE: CPT

## 2025-01-06 PROCEDURE — 80053 COMPREHEN METABOLIC PANEL: CPT

## 2025-01-06 PROCEDURE — 83605 ASSAY OF LACTIC ACID: CPT

## 2025-01-06 PROCEDURE — 74177 CT ABD & PELVIS W/CONTRAST: CPT | Mod: MC

## 2025-01-06 PROCEDURE — 85610 PROTHROMBIN TIME: CPT

## 2025-01-06 PROCEDURE — 36415 COLL VENOUS BLD VENIPUNCTURE: CPT

## 2025-01-06 PROCEDURE — 80048 BASIC METABOLIC PNL TOTAL CA: CPT

## 2025-01-06 PROCEDURE — 85025 COMPLETE CBC W/AUTO DIFF WBC: CPT

## 2025-01-06 PROCEDURE — 86140 C-REACTIVE PROTEIN: CPT

## 2025-01-06 PROCEDURE — 87507 IADNA-DNA/RNA PROBE TQ 12-25: CPT

## 2025-01-06 PROCEDURE — 96361 HYDRATE IV INFUSION ADD-ON: CPT

## 2025-01-06 PROCEDURE — 84145 PROCALCITONIN (PCT): CPT

## 2025-01-06 PROCEDURE — 83735 ASSAY OF MAGNESIUM: CPT

## 2025-01-06 PROCEDURE — 81001 URINALYSIS AUTO W/SCOPE: CPT

## 2025-01-06 PROCEDURE — 85027 COMPLETE CBC AUTOMATED: CPT

## 2025-01-06 PROCEDURE — 96374 THER/PROPH/DIAG INJ IV PUSH: CPT

## 2025-01-06 PROCEDURE — 85652 RBC SED RATE AUTOMATED: CPT

## 2025-01-06 PROCEDURE — 87324 CLOSTRIDIUM AG IA: CPT

## 2025-01-06 PROCEDURE — 87040 BLOOD CULTURE FOR BACTERIA: CPT

## 2025-01-06 PROCEDURE — 87637 SARSCOV2&INF A&B&RSV AMP PRB: CPT

## 2025-01-06 PROCEDURE — 99285 EMERGENCY DEPT VISIT HI MDM: CPT | Mod: 25

## 2025-01-06 RX ORDER — MESALAMINE 375 MG/1
2 CAPSULE, EXTENDED RELEASE ORAL
Qty: 240 | Refills: 1
Start: 2025-01-06 | End: 2025-03-06

## 2025-01-06 RX ORDER — UPADACITINIB 1 MG/ML
1 SOLUTION ORAL
Refills: 0 | DISCHARGE

## 2025-01-06 RX ORDER — PREDNISONE 5 MG
4 TABLET ORAL
Qty: 70 | Refills: 1
Start: 2025-01-06

## 2025-01-06 RX ORDER — MESALAMINE 375 MG/1
2 CAPSULE, EXTENDED RELEASE ORAL
Refills: 0 | DISCHARGE

## 2025-01-06 RX ADMIN — Medication 40 MILLIGRAM(S): at 05:27

## 2025-01-06 RX ADMIN — MESALAMINE 1000 MILLIGRAM(S): 375 CAPSULE, EXTENDED RELEASE ORAL at 12:53

## 2025-01-06 RX ADMIN — PIPERACILLIN AND TAZOBACTAM 25 GRAM(S): 3; .375 INJECTION, POWDER, LYOPHILIZED, FOR SOLUTION INTRAVENOUS at 05:27

## 2025-01-06 NOTE — DISCHARGE NOTE PROVIDER - NSDCMRMEDTOKEN_GEN_ALL_CORE_FT
Pentasa 500 mg oral capsule, extended release: 2 cap(s) orally once a day  Rinvoq 45 mg oral tablet, extended release: 1 tab(s) orally once a day   Pentasa 500 mg oral capsule, extended release: 2 cap(s) orally 4 times a day  predniSONE 10 mg oral tablet: 4 tab(s) orally once a day Take 40mg (4 tabs) daily for 7 days, then decrease by 10mg every week, over 4 weeks  Rinvoq 45 mg oral tablet, extended release: 1 tab(s) orally once a day

## 2025-01-06 NOTE — PROGRESS NOTE ADULT - REASON FOR ADMISSION
abdominal pain, diarrhea

## 2025-01-06 NOTE — CARE COORDINATION ASSESSMENT. - NSCAREPROVIDERS_GEN_ALL_CORE_FT
CARE PROVIDERS:  Accepting Physician: Nik Macdonald  Administration: Kris Sanderson  Admitting: Nik Macdonald  Attending: Nik Macdonald  Consultant: Levy Morris  Consultant: Ebenezer Lechuga  Consultant: Heriberto Torres  Consultant: Amirah Wang  Consultant: Kylie Espinoza  Consultant: Kori Calabrese  Consultant: Fely Loyd ED ACP: Perfecto Pa ED Attending: Zak Kirk  ED Nurse: Antoinette Manzano  Infection Control: Carlito Yung  Nurse: Jalil Bulter  Nurse: Rupali Hill  Nurse: Deepthi May  Ordered: Doctor, Unknown  Outpatient Provider: Syed William  Outpatient Provider: Herson Macdonald  Outpatient Provider: Nik Macdonald  Outpatient Provider: Case Wade  Override: Joyce Reis  PCA/Nursing Assistant: Juvenal Medina  PCA/Nursing Assistant: Verna Kim  Primary Team: Caitie Benavidez  Registered Dietitian: Ninoska Dong  Respiratory Therapy: Colette Perez  : Daysi Barlow

## 2025-01-06 NOTE — DISCHARGE NOTE PROVIDER - PROVIDER TOKENS
PROVIDER:[TOKEN:[[id number]],FOLLOWUP:[1 week],ESTABLISHEDPATIENT:[T]],PROVIDER:[TOKEN:[[id number]],FOLLOWUP:[1 week],ESTABLISHEDPATIENT:[T]]

## 2025-01-06 NOTE — PROGRESS NOTE ADULT - ASSESSMENT
Abdominal pain  Diarrhea  Hx UC (on Rinvoq & Pentaza)    Recommendations:  - CT AP noted & d/w patient  - C. Diff negative  - GI PCR + Rotavirus  - Continue solumedrol 20mg IVP q8h for possible UC flare from infection   - Can transition to PO steroids tomorrow 1/6; prednisone 40mg QD, taper 10mg weekly   - Monitor stools and GI function, overall improving  - Advance diet   - Supportive care   - Trend electrolytes, replete PRN  - There has been some recent medication non-compliance  - Will need close outpt GI follow up once discharged  - GI to follow      I reviewed the overnight course of events on the unit, re-confirming the patient history. I discussed the care with the patient  Differential diagnosis and plan of care discussed with patient after the evaluation  35 minutes spent on total encounter of which more than fifty percent of the encounter was spent counseling and/or coordinating care by the attending physician.
Abdominal pain  Diarrhea  Hx UC (on Rinvoq & Pentaza)    Recommendations:  - CT AP noted & d/w patient  - C. Diff negative  - GI PCR + Rotavirus  - Continue solumedrol 20mg IVP q8h for possible UC flare from infection   - Can transition to PO steroids; prednisone 40mg QD, taper 10mg weekly   - Monitor stools and GI function, overall improving  - Reg diet as tolerated  - Supportive care   - Trend electrolytes, replete PRN  - There has been some recent medication non-compliance  - Pt has follow up scheduled with GI this week    I reviewed the overnight course of events on the unit, re-confirming the patient history. I discussed the care with the patient  Differential diagnosis and plan of care discussed with patient after the evaluation  35 minutes spent on total encounter of which more than fifty percent of the encounter was spent counseling and/or coordinating care by the attending physician.
Abdominal pain  Diarrhea  Hx UC (on Rinvoq & Pentaza)    Recommendations:  - CT AP noted & d/w patient  - C. Diff negative  - GI PCR + Rotavirus  - Start solumedrol 20mg IVP q8h for possible UC flare from infection   - Monitor stools and GI function   - Okay from GI standpoint for advance diet as tolerated  - Supportive care   - Trend electrolytes, replete PRN  - There has been some recent medication non-compliance  - Will need close outpt GI follow up once discharged  - GI to follow      I reviewed the overnight course of events on the unit, re-confirming the patient history. I discussed the care with the patient  Differential diagnosis and plan of care discussed with patient after the evaluation  35 minutes spent on total encounter of which more than fifty percent of the encounter was spent counseling and/or coordinating care by the attending physician.

## 2025-01-06 NOTE — PROGRESS NOTE ADULT - PROBLEM SELECTOR PLAN 1
Clinically improved  Transition to prednisone 40mg qd, taper 10mg qweek  Continue pentasa 1g qid  Resume Rinvoq 45mg qd  Cleared by GI for d/c home with outpatient f/u

## 2025-01-06 NOTE — DISCHARGE NOTE PROVIDER - HOSPITAL COURSE
This is a 39-year-old male with past medical history of ulcerative colitis presents today due to abdominal pain for the last few days.  Patient reports that he missed a weeks worth of his ulcerative colitis medications which she has now experiencing abdominal pain, bloody stools, diarrhea, fatigue. pt takes Pentaza and Rinvoq. Patient denies chest pain, shortness of breath, dysuria, hematuria, prior abdominal surgery, or any other complaints.      Problem/Plan - 1:  ·  Problem: Exacerbation of ulcerative colitis.   ·  Plan: Continue iv Zosyn   Continue Pentasa  On clear liquid diet -- advance diet as per GI  Continue iv solumedrol 20mg q8h as per GI  Trend WBC  GI f/u  Surgical consult -- noted -- no appendicitis  Further work-up/management pending clinical course.     Problem/Plan - 2:  ·  Problem: Rotavirus enteritis.   ·  Plan: Supportive care  GI f/u.      Improved. Tolerating diet.  Going home.    >35 minutes spent on discharge   This is a 39-year-old male with past medical history of ulcerative colitis presents today due to abdominal pain for the last few days.  Patient reports that he missed a weeks worth of his ulcerative colitis medications which she has now experiencing abdominal pain, bloody stools, diarrhea, fatigue. pt takes Pentaza and Rinvoq. Patient denies chest pain, shortness of breath, dysuria, hematuria, prior abdominal surgery, or any other complaints.      Problem/Plan - 1:  ·  Problem: Exacerbation of ulcerative colitis.   ·  Plan: Continue iv Zosyn   Continue Pentasa  On clear liquid diet -- advanced diet as per GI  Rx with iv solumedrol 20mg q8h as per GI  Trend WBC  GI f/u  Surgical consult -- noted -- no appendicitis  Further work-up/management pending clinical course.     Problem/Plan - 2:  ·  Problem: Rotavirus enteritis.   ·  Plan: Supportive care  GI f/u.      Improved. Tolerating diet.  Change to po prednisone  Going home.    >35 minutes spent on discharge

## 2025-01-06 NOTE — CASE MANAGEMENT PROGRESS NOTE - NSCMPROGRESSNOTE_GEN_ALL_CORE
Per MD, patient is medically cleared for discharge home today.  CM met with patient to discuss discharge disposition. Has no skilled home care needs. Discharge notice signed and copy given to patient. Patient stated his girlfriend will transport him home. Patient verbalized understanding of the transition plan and is in agreement.  CM remains available throughout hospital stay.

## 2025-01-06 NOTE — DISCHARGE NOTE NURSING/CASE MANAGEMENT/SOCIAL WORK - PATIENT PORTAL LINK FT
You can access the FollowMyHealth Patient Portal offered by [facility] by registering at the following website: [email or web address]. By joining [facility]’s FollowMyHealth portal, you will also be able to view your health information using other applications (apps) compatible with our system.

## 2025-01-06 NOTE — DISCHARGE NOTE PROVIDER - NSDCCPCAREPLAN_GEN_ALL_CORE_FT
PRINCIPAL DISCHARGE DIAGNOSIS  Diagnosis: Ulcerative colitis  Assessment and Plan of Treatment: Continue current medications  Finish course of antibiotics.  Follow-up with your primary care doctor  and GI within 1 week.     PRINCIPAL DISCHARGE DIAGNOSIS  Diagnosis: Ulcerative colitis  Assessment and Plan of Treatment: Take prednisone 40mg (4 tabs) every day for 7 days, then decrease by 10mg every week. So week #2 take 30mg daily (3 tabs) for 7 days, then week #3 take 20mg (2 tabs daily) for 7 days, then week #4 take 10mg (1 tab daily) for 7 days and then stop  Take Pentasa 1000mg 4x day  Take Rinvoq 45mg daily  Follow-up with your primary care doctor  and GI this week.

## 2025-01-06 NOTE — DISCHARGE NOTE PROVIDER - CARE PROVIDER_API CALL
Case Wade  Internal Medicine  [address], [address]  [place] [address]  Phone: [phone]  Fax: [phone]  Established Patient  Follow Up Time: 1 week    Herson Macdonald  Gastroenterology  [address] [place] [address]  Phone: [phone]  Fax: [phone]  Established Patient  Follow Up Time: 1 week

## 2025-01-06 NOTE — CARE COORDINATION ASSESSMENT. - NSPASTMEDSURGHISTORY_GEN_ALL_CORE_FT
Problem: PAIN - ADULT  Goal: Verbalizes/displays adequate comfort level or baseline comfort level  Description: Interventions:  - Encourage patient to monitor pain and request assistance  - Assess pain using appropriate pain scale  - Administer analgesics based on type and severity of pain and evaluate response  - Implement non-pharmacological measures as appropriate and evaluate response  - Consider cultural and social influences on pain and pain management  - Notify physician/advanced practitioner if interventions unsuccessful or patient reports new pain  Outcome: Progressing     Problem: INFECTION - ADULT  Goal: Absence or prevention of progression during hospitalization  Description: INTERVENTIONS:  - Assess and monitor for signs and symptoms of infection  - Monitor lab/diagnostic results  - Monitor all insertion sites, i.e. indwelling lines, tubes, and drains  - Monitor endotracheal if appropriate and nasal secretions for changes in amount and color  - [place] appropriate cooling/warming therapies per order  - Administer medications as ordered  - Instruct and encourage patient and family to use good hand hygiene technique  - Identify and instruct in appropriate isolation precautions for identified infection/condition  Outcome: Progressing     Problem: SAFETY ADULT  Goal: Patient will remain free of falls  Description: INTERVENTIONS:  - Educate patient/family on patient safety including physical limitations  - Instruct patient to call for assistance with activity   - Consult OT/PT to assist with strengthening/mobility   - Keep Call bell within reach  - Keep bed low and locked with side rails adjusted as appropriate  - Keep care items and personal belongings within reach  - Initiate and maintain comfort rounds  - Make Fall Risk Sign visible to staff  - Offer Toileting every 2 Hours, in advance of need  - Initiate/Maintain bed alarm  - Obtain necessary fall risk management equipment  - Apply yellow socks and  bracelet for high fall risk patients  - Consider moving patient to room near nurses station  Outcome: Progressing  Goal: Maintain or return to baseline ADL function  Description: INTERVENTIONS:  -  Assess patient's ability to carry out ADLs; assess patient's baseline for ADL function and identify physical deficits which impact ability to perform ADLs (bathing, care of mouth/teeth, toileting, grooming, dressing, etc.)  - Assess/evaluate cause of self-care deficits   - Assess range of motion  - Assess patient's mobility; develop plan if impaired  - Assess patient's need for assistive devices and provide as appropriate  - Encourage maximum independence but intervene and supervise when necessary  - Involve family in performance of ADLs  - Assess for home care needs following discharge   - Consider OT consult to assist with ADL evaluation and planning for discharge  - Provide patient education as appropriate  Outcome: Progressing  Goal: Maintains/Returns to pre admission functional level  Description: INTERVENTIONS:  - Perform AM-PAC 6 Click Basic Mobility/ Daily Activity assessment daily.  - Set and communicate daily mobility goal to care team and patient/family/caregiver.   - Collaborate with rehabilitation services on mobility goals if consulted  - Perform Range of Motion 3 times a day.  - Reposition patient every 2 hours.  - Dangle patient 3 times a day  - Stand patient 3 times a day  - Ambulate patient 3 times a day  - Out of bed to chair 3 times a day   - Out of bed for meals 3 times a day  - Out of bed for toileting  - Record patient progress and toleration of activity level   Outcome: Progressing     Problem: DISCHARGE PLANNING  Goal: Discharge to home or other facility with appropriate resources  Description: INTERVENTIONS:  - Identify barriers to discharge w/patient and caregiver  - Arrange for needed discharge resources and transportation as appropriate  - Identify discharge learning needs (meds, wound care,  etc.)  - Arrange for interpretive services to assist at discharge as needed  - Refer to Case Management Department for coordinating discharge planning if the patient needs post-hospital services based on physician/advanced practitioner order or complex needs related to functional status, cognitive ability, or social support system  Outcome: Progressing     Problem: Knowledge Deficit  Goal: Patient/family/caregiver demonstrates understanding of disease process, treatment plan, medications, and discharge instructions  Description: Complete learning assessment and assess knowledge base.  Interventions:  - Provide teaching at level of understanding  - Provide teaching via preferred learning methods  Outcome: Progressing     Problem: NEUROSENSORY - ADULT  Goal: Achieves stable or improved neurological status  Description: INTERVENTIONS  - Monitor and report changes in neurological status  - Monitor vital signs such as temperature, blood pressure, glucose, and any other labs ordered   - Initiate measures to prevent increased intracranial pressure  - Monitor for seizure activity and implement precautions if appropriate      Outcome: Progressing  Goal: Remains free of injury related to seizures activity  Description: INTERVENTIONS  - Maintain airway, patient safety  and administer oxygen as ordered  - Monitor patient for seizure activity, document and report duration and description of seizure to physician/advanced practitioner  - If seizure occurs,  ensure patient safety during seizure  - Reorient patient post seizure  - Seizure pads on all 4 side rails  - Instruct patient/family to notify RN of any seizure activity including if an aura is experienced  - Instruct patient/family to call for assistance with activity based on nursing assessment  - Administer anti-seizure medications if ordered    Outcome: Progressing  Goal: Achieves maximal functionality and self care  Description: INTERVENTIONS  - Monitor swallowing and  airway patency with patient fatigue and changes in neurological status  - Encourage and assist patient to increase activity and self care.   - Encourage visually impaired, hearing impaired and aphasic patients to use assistive/communication devices  Outcome: Progressing     Problem: Neurological Deficit  Goal: Neurological status is stable or improving  Description: Interventions:  - Monitor and assess patient's level of consciousness, motor function, sensory function, and level of assistance needed for ADLs.   - Monitor and report changes from baseline. Collaborate with interdisciplinary team to initiate plan and implement interventions as ordered.   - Provide and maintain a safe environment.  - Consider seizure precautions.  - Consider fall precautions.  - Consider aspiration precautions.  - Consider bleeding precautions.  Outcome: Progressing     Problem: Activity Intolerance/Impaired Mobility  Goal: Mobility/activity is maintained at optimum level for patient  Description: Interventions:  - Assess and monitor patient  barriers to mobility and need for assistive/adaptive devices.  - Assess patient's emotional response to limitations.  - Collaborate with interdisciplinary team and initiate plans and interventions as ordered.  - Encourage independent activity per ability.  - Maintain proper body alignment.  - Perform active/passive rom as tolerated/ordered.  - Plan activities to conserve energy.  - Turn patient as appropriate  Outcome: Progressing     Problem: Communication Impairment  Goal: Ability to express needs and understand communication  Description: Assess patient's communication skills and ability to understand information.  Patient will demonstrate use of effective communication techniques, alternative methods of communication and understanding even if not able to speak.     - Encourage communication and provide alternate methods of communication as needed.  - Collaborate with case management/social  services for discharge needs.  - Include patient/family/caregiver in decisions related to communication.  Outcome: Progressing     Problem: Potential for Aspiration  Goal: Non-ventilated patient's risk of aspiration is minimized  Description: Assess and monitor vital signs, respiratory status, and labs (WBC).  Monitor for signs of aspiration (tachypnea, cough, rales, wheezing, cyanosis, fever).    - Assess and monitor patient's ability to swallow.  - Place patient up in chair to eat if possible.  - HOB up at 90 degrees to eat if unable to get patient up into chair.  - Supervise patient during oral intake.   - Instruct patient/ family to take small bites.  - Instruct patient/ family to take small single sips when taking liquids.  - Follow patient-specific strategies generated by speech pathologist.  Outcome: Progressing  Goal: Ventilated patient's risk of aspiration is minimized  Description: Assess and monitor vital signs, respiratory status, airway cuff pressure, and labs (WBC).  Monitor for signs of aspiration (tachypnea, cough, rales, wheezing, cyanosis, fever).    - Elevate head of bed 30 degrees if patient has tube feeding.  - Monitor tube feeding.  Outcome: Progressing     Problem: Nutrition  Goal: Nutrition/Hydration status is improving  Description: Monitor and assess patient's nutrition/hydration status for malnutrition (ex- brittle hair, bruises, dry skin, pale skin and conjunctiva, muscle wasting, smooth red tongue, and disorientation). Collaborate with interdisciplinary team and initiate plan and interventions as ordered.  Monitor patient's weight and dietary intake as ordered or per policy. Utilize nutrition screening tool and intervene per policy. Determine patient's food preferences and provide high-protein, high-caloric foods as appropriate.     - Assist patient with eating.  - Allow adequate time for meals.  - Encourage patient to take dietary supplement as ordered.  - Collaborate with clinical  nutritionist.  - Include patient/family/caregiver in decisions related to nutrition.  Outcome: Progressing      PAST MEDICAL & SURGICAL HISTORY:  Anxiety and depression      Hydrocephalus      Ulcerative colitis      No significant past surgical history

## 2025-01-06 NOTE — DISCHARGE NOTE NURSING/CASE MANAGEMENT/SOCIAL WORK - FINANCIAL ASSISTANCE
[facility] provides services at a reduced cost to those who are determined to be eligible through [facility]’s financial assistance program. Information regarding [facility]’s financial assistance program can be found by going to [email or web address] or by calling [phone].

## 2025-01-06 NOTE — PROGRESS NOTE ADULT - PROVIDER SPECIALTY LIST ADULT
Gastroenterology
Internal Medicine
No
Internal Medicine
Internal Medicine

## 2025-01-06 NOTE — CARE COORDINATION ASSESSMENT. - ASSESSMENT CONCERNS TO BE ADDRESSED
Reason for Admission: abdominal pain, diarrhea  History of Present Illness:   This is a 39-year-old male with past medical history of ulcerative colitis presents today due to abdominal pain for the last few days.  Patient reports that he missed a weeks worth of his ulcerative colitis medications/care coordination/discharge planning

## 2025-01-06 NOTE — DISCHARGE NOTE PROVIDER - CARE PROVIDERS DIRECT ADDRESSES
,DirectAddress_Unknown,[email or web address]

## 2025-01-06 NOTE — CARE COORDINATION ASSESSMENT. - OTHER PERTINENT DISCHARGE PLANNING INFORMATION:
Met patient at bedside.  Explained role of CM, verbalized understanding. Pt was made aware a CM will remain available through hospitalization.  Contact information given in discharge/ transitions resource folder. Patient reports he lives w/father, independent w/ADLs and ambulation. Works full time.

## 2025-01-06 NOTE — PROGRESS NOTE ADULT - SUBJECTIVE AND OBJECTIVE BOX
Date of Service: 01-05-25 @ 11:02    Patient is a 39y old  Male who presents with a chief complaint of abdominal pain, diarrhea (04 Jan 2025 13:30)      INTERVAL HPI/OVERNIGHT EVENTS: Patient seen and examined. NAD. Still with diarrhea.    Vital Signs Last 24 Hrs  T(C): 36.5 (05 Jan 2025 04:30), Max: 36.6 (04 Jan 2025 21:44)  T(F): 97.7 (05 Jan 2025 04:30), Max: 97.9 (04 Jan 2025 21:44)  HR: 80 (05 Jan 2025 04:30) (72 - 80)  BP: 115/70 (05 Jan 2025 04:30) (100/65 - 134/79)  BP(mean): --  RR: 18 (05 Jan 2025 04:30) (16 - 18)  SpO2: 100% (05 Jan 2025 04:30) (98% - 100%)    Parameters below as of 05 Jan 2025 04:30  Patient On (Oxygen Delivery Method): room air        01-04    137  |  104  |  5[L]  ----------------------------<  102[H]  4.1   |  25  |  0.87    Ca    8.6      04 Jan 2025 12:30  Mg     2.1     01-04    TPro  6.2  /  Alb  2.3[L]  /  TBili  0.9  /  DBili  x   /  AST  10[L]  /  ALT  23  /  AlkPhos  115  01-04                          8.8    12.34 )-----------( 394      ( 04 Jan 2025 12:30 )             28.6     PT/INR - ( 03 Jan 2025 15:00 )   PT: 13.3 sec;   INR: 1.14 ratio         PTT - ( 03 Jan 2025 15:00 )  PTT:29.2 sec  CAPILLARY BLOOD GLUCOSE        Urinalysis Basic - ( 04 Jan 2025 12:30 )    Color: x / Appearance: x / SG: x / pH: x  Gluc: 102 mg/dL / Ketone: x  / Bili: x / Urobili: x   Blood: x / Protein: x / Nitrite: x   Leuk Esterase: x / RBC: x / WBC x   Sq Epi: x / Non Sq Epi: x / Bacteria: x              acetaminophen     Tablet .. 650 milliGRAM(s) Oral every 6 hours PRN  aluminum hydroxide/magnesium hydroxide/simethicone Suspension 30 milliLiter(s) Oral every 4 hours PRN  melatonin 3 milliGRAM(s) Oral at bedtime PRN  mesalamine ER Capsule 1000 milliGRAM(s) Oral daily  methylPREDNISolone sodium succinate Injectable 20 milliGRAM(s) IV Push three times a day  ondansetron Injectable 4 milliGRAM(s) IV Push every 6 hours PRN  piperacillin/tazobactam IVPB.. 3.375 Gram(s) IV Intermittent every 8 hours              REVIEW OF SYSTEMS:  CONSTITUTIONAL: No fever, no weight loss, or no fatigue  NECK: No pain, no stiffness  RESPIRATORY: No cough, no wheezing, no chills, no hemoptysis, No shortness of breath  CARDIOVASCULAR: No chest pain, no palpitations, no dizziness, no leg swelling  GASTROINTESTINAL: No abdominal pain. No nausea, no vomiting, no hematemesis; + diarrhea, no constipation. No melena, no hematochezia.  GENITOURINARY: No dysuria, no frequency, no hematuria, no incontinence  NEUROLOGICAL: No headaches, no loss of strength, no numbness, no tremors  SKIN: No itching, no burning  MUSCULOSKELETAL: No joint pain, no swelling; No muscle, no back, no extremity pain  PSYCHIATRIC: No depression, no mood swings,   HEME/LYMPH: No easy bruising, no bleeding gums  ALLERY AND IMMUNOLOGIC: No hives       Consultant(s) Notes Reviewed:  [X] YES  [ ] NO    PHYSICAL EXAM:  GENERAL: NAD  HEAD:  Atraumatic, Normocephalic  EYES: EOMI, PERRLA, conjunctiva and sclera clear  ENMT: No tonsillar erythema, exudates, or enlargement; Moist mucous membranes  NECK: Supple, No JVD  NERVOUS SYSTEM:  Awake & alert  CHEST/LUNG: Clear to auscultation bilaterally; No rales, rhonchi, wheezing,  HEART: Regular rate and rhythm  ABDOMEN: Soft, Nontender, Nondistended; Bowel sounds present  EXTREMITIES:  No clubbing, cyanosis, or edema  LYMPH: No lymphadenopathy noted  SKIN: No rashes      Advanced care planning discussed with patient/family [X] YES   [ ] NO    Advanced care planning discussed with patient/family. Patient's health status was discussed. All appropriate changes have been made regarding patient's end-of-life care. Advanced care planning forms reviewed/discussed/completed.  20 minutes spent.   
[facility]    Syed Alegre NP    [address] [place] [address] [phone]      Chief Complaint:  Patient is a 39y old  Male who presents with a chief complaint of abdominal pain, diarrhea (04 Jan 2025 09:50)      HPI/ 24 hr events:   Patient seen and examined at bedside  Endorses >10 liquid, brown stools overnight   Denies blood in stool  Also with fecal urgency/incontinence and intermittent, lower abdominal pain   No fever, chills, nausea or vomiting      REVIEW OF SYSTEMS:   General: Negative  HEENT: Negative  CV: Negative  Respiratory: Negative  GI: See HPI  : Negative  MSK: Negative  Hematologic: Negative  Skin: Negative    MEDICATIONS:   MEDICATIONS  (STANDING):  mesalamine ER Capsule 1000 milliGRAM(s) Oral daily  methylPREDNISolone sodium succinate Injectable 20 milliGRAM(s) IV Push three times a day  piperacillin/tazobactam IVPB.. 3.375 Gram(s) IV Intermittent every 8 hours  sodium chloride 0.9%. 1000 milliLiter(s) (100 mL/Hr) IV Continuous <Continuous>    MEDICATIONS  (PRN):  acetaminophen     Tablet .. 650 milliGRAM(s) Oral every 6 hours PRN Temp greater or equal to 38C (100.4F), Mild Pain (1 - 3)  aluminum hydroxide/magnesium hydroxide/simethicone Suspension 30 milliLiter(s) Oral every 4 hours PRN Dyspepsia  melatonin 3 milliGRAM(s) Oral at bedtime PRN Insomnia  ondansetron Injectable 4 milliGRAM(s) IV Push every 6 hours PRN Nausea and/or Vomiting      ALLERGIES:   Allergies    No Known Allergies    Intolerances        VITAL SIGNS:   Vital Signs Last 24 Hrs  T(C): 36.9 (04 Jan 2025 07:50), Max: 38.3 (04 Jan 2025 00:16)  T(F): 98.4 (04 Jan 2025 07:50), Max: 101 (04 Jan 2025 00:16)  HR: 81 (04 Jan 2025 07:50) (77 - 94)  BP: 122/78 (04 Jan 2025 07:50) (114/79 - 125/75)  BP(mean): --  RR: 17 (04 Jan 2025 07:50) (16 - 19)  SpO2: 100% (04 Jan 2025 07:50) (97% - 100%)    Parameters below as of 04 Jan 2025 06:00  Patient On (Oxygen Delivery Method): room air      I&O's Summary      PHYSICAL EXAM:   GENERAL:  No acute distress  HEENT:  NC/AT  CHEST:  No increased effort  HEART:  Regular rate  ABDOMEN:  Soft, non-tender, non-distended  EXTREMITIES: No cyanosis  SKIN:  Warm, dry  NEURO:  Calm, cooperative    LABS:                        8.8    12.34 )-----------( 394      ( 04 Jan 2025 12:30 )             28.6     01-04    137  |  104  |  5[L]  ----------------------------<  102[H]  4.1   |  25  |  0.87    Ca    8.6      04 Jan 2025 12:30  Mg     2.1     01-04    TPro  6.2  /  Alb  2.3[L]  /  TBili  0.9  /  DBili  x   /  AST  10[L]  /  ALT  23  /  AlkPhos  115  01-04    LIVER FUNCTIONS - ( 04 Jan 2025 12:30 )  Alb: 2.3 g/dL / Pro: 6.2 g/dL / ALK PHOS: 115 U/L / ALT: 23 U/L / AST: 10 U/L / GGT: x           PT/INR - ( 03 Jan 2025 15:00 )   PT: 13.3 sec;   INR: 1.14 ratio         PTT - ( 03 Jan 2025 15:00 )  PTT:29.2 sec    Urinalysis with Rflx Culture (collected 04 Jan 2025 00:24)      Lipase: 12 U/L (01-03-25 @ 15:00)                  Clostridium difficile GDH Interpretation: Negative for toxigenic C. Difficile.  This specimen is negative for C.  Difficile glutamate dehydrogenase (GDH) antigen and negative for C.  Difficile Toxins A & B, by EIA.  GDH is a highly sensitive screening  marker for C. Difficile that is produced in large amounts by all C.  Difficile strains, both toxigenic and nontoxigenic.  This assay has not  been validated as a test of cure.  Repeat testing during the same episode  of diarrhea is of limited value and is discouraged.  The results of this  assay should always be interpreted in conjunction with patient's clinical  history. (01-04-25 @ 07:00)  GI PCR Panel: Detected (01-04-25 @ 00:25)          Urinalysis with Rflx Culture (collected 01-04-25 @ 00:24)            RADIOLOGY & ADDITIONAL STUDIES:  
[facility]    Syed Alegre NP    [address] [place] [address] [phone]      Chief Complaint:  Patient is a 39y old  Male who presents with a chief complaint of abdominal pain, diarrhea (04 Jan 2025 13:30)      HPI/ 24 hr events:   Patient seen and examined at bedside  Reports stool urgency & frequency is improving  Had ~6 liquid/mush stools overnight  Endorses intermittent, lower abdominal pain  Tolerating clear liquids   No fever, chills, nausea, vomiting or hematochezia         REVIEW OF SYSTEMS:   General: Negative  HEENT: Negative  CV: Negative  Respiratory: Negative  GI: See HPI  : Negative  MSK: Negative  Hematologic: Negative  Skin: Negative    MEDICATIONS:   MEDICATIONS  (STANDING):  mesalamine ER Capsule 1000 milliGRAM(s) Oral daily  methylPREDNISolone sodium succinate Injectable 20 milliGRAM(s) IV Push three times a day  piperacillin/tazobactam IVPB.. 3.375 Gram(s) IV Intermittent every 8 hours  sodium chloride 0.9%. 1000 milliLiter(s) (100 mL/Hr) IV Continuous <Continuous>    MEDICATIONS  (PRN):  acetaminophen     Tablet .. 650 milliGRAM(s) Oral every 6 hours PRN Temp greater or equal to 38C (100.4F), Mild Pain (1 - 3)  aluminum hydroxide/magnesium hydroxide/simethicone Suspension 30 milliLiter(s) Oral every 4 hours PRN Dyspepsia  melatonin 3 milliGRAM(s) Oral at bedtime PRN Insomnia  ondansetron Injectable 4 milliGRAM(s) IV Push every 6 hours PRN Nausea and/or Vomiting      ALLERGIES:   Allergies    No Known Allergies    Intolerances        VITAL SIGNS:   Vital Signs Last 24 Hrs  T(C): 36.5 (05 Jan 2025 04:30), Max: 36.6 (04 Jan 2025 21:44)  T(F): 97.7 (05 Jan 2025 04:30), Max: 97.9 (04 Jan 2025 21:44)  HR: 80 (05 Jan 2025 04:30) (72 - 80)  BP: 115/70 (05 Jan 2025 04:30) (100/65 - 134/79)  BP(mean): --  RR: 18 (05 Jan 2025 04:30) (16 - 18)  SpO2: 100% (05 Jan 2025 04:30) (98% - 100%)    Parameters below as of 05 Jan 2025 04:30  Patient On (Oxygen Delivery Method): room air      I&O's Summary      PHYSICAL EXAM:   GENERAL:  No acute distress  HEENT:  NC/AT  CHEST:  No increased effort  HEART:  Regular rate  ABDOMEN:  Soft, +TTP LLQ & RLQ, non-distended, positive bowel sounds  EXTREMITIES: No cyanosis  SKIN:  Warm, dry  NEURO:  Calm, cooperative    LABS:                        8.8    12.34 )-----------( 394      ( 04 Jan 2025 12:30 )             28.6     01-04    137  |  104  |  5[L]  ----------------------------<  102[H]  4.1   |  25  |  0.87    Ca    8.6      04 Jan 2025 12:30  Mg     2.1     01-04    TPro  6.2  /  Alb  2.3[L]  /  TBili  0.9  /  DBili  x   /  AST  10[L]  /  ALT  23  /  AlkPhos  115  01-04    LIVER FUNCTIONS - ( 04 Jan 2025 12:30 )  Alb: 2.3 g/dL / Pro: 6.2 g/dL / ALK PHOS: 115 U/L / ALT: 23 U/L / AST: 10 U/L / GGT: x           PT/INR - ( 03 Jan 2025 15:00 )   PT: 13.3 sec;   INR: 1.14 ratio         PTT - ( 03 Jan 2025 15:00 )  PTT:29.2 sec    Urinalysis with Rflx Culture (collected 04 Jan 2025 00:24)    Culture - Blood (collected 03 Jan 2025 15:00)  Source: .Blood BLOOD  Preliminary Report (04 Jan 2025 22:01):    No growth at 24 hours    Culture - Blood (collected 03 Jan 2025 14:50)  Source: .Blood BLOOD  Preliminary Report (04 Jan 2025 22:01):    No growth at 24 hours                      Clostridium difficile GDH Interpretation: Negative for toxigenic C. Difficile.  This specimen is negative for C.  Difficile glutamate dehydrogenase (GDH) antigen and negative for C.  Difficile Toxins A & B, by EIA.  GDH is a highly sensitive screening  marker for C. Difficile that is produced in large amounts by all C.  Difficile strains, both toxigenic and nontoxigenic.  This assay has not  been validated as a test of cure.  Repeat testing during the same episode  of diarrhea is of limited value and is discouraged.  The results of this  assay should always be interpreted in conjunction with patient's clinical  history. (01-04-25 @ 07:00)  GI PCR Panel: Detected (01-04-25 @ 00:25)                RADIOLOGY & ADDITIONAL STUDIES:  
[facility]  Herson Nance PA-C  [address] [place] [id number]  [phone]      INTERVAL HPI/OVERNIGHT EVENTS:  Pt s/e  Abdominal pain and diarrhea improving  Tolerating regular diet  No new GI events    MEDICATIONS  (STANDING):  mesalamine ER Capsule 1000 milliGRAM(s) Oral daily  piperacillin/tazobactam IVPB.. 3.375 Gram(s) IV Intermittent every 8 hours  predniSONE   Tablet 40 milliGRAM(s) Oral daily    MEDICATIONS  (PRN):  acetaminophen     Tablet .. 650 milliGRAM(s) Oral every 6 hours PRN Temp greater or equal to 38C (100.4F), Mild Pain (1 - 3)  aluminum hydroxide/magnesium hydroxide/simethicone Suspension 30 milliLiter(s) Oral every 4 hours PRN Dyspepsia  melatonin 3 milliGRAM(s) Oral at bedtime PRN Insomnia  ondansetron Injectable 4 milliGRAM(s) IV Push every 6 hours PRN Nausea and/or Vomiting      Allergies  No Known Allergies      PHYSICAL EXAM:   Vital Signs:  Vital Signs Last 24 Hrs  T(C): 36.8 (06 Jan 2025 05:42), Max: 36.8 (05 Jan 2025 20:35)  T(F): 98.3 (06 Jan 2025 05:42), Max: 98.3 (05 Jan 2025 20:35)  HR: 75 (06 Jan 2025 05:42) (75 - 75)  BP: 118/76 (06 Jan 2025 05:42) (111/74 - 118/76)  BP(mean): --  RR: 18 (06 Jan 2025 05:42) (18 - 18)  SpO2: 98% (06 Jan 2025 05:42) (98% - 100%)    Parameters below as of 06 Jan 2025 05:42  Patient On (Oxygen Delivery Method): room air    GENERAL:  Appears stated age  HEENT:  NC/AT  CHEST:  Full & symmetric excursion  HEART:  Regular rhythm  ABDOMEN:  Soft, non-tender, non-distended  EXTEREMITIES:  no cyanosis  SKIN:  No rash  NEURO:  Alert      LABS:                        8.5    12.31 )-----------( 524      ( 06 Jan 2025 06:50 )             28.1     01-06    139  |  107  |  6[L]  ----------------------------<  139[H]  4.2   |  26  |  0.79    Ca    9.0      06 Jan 2025 06:50  Mg     2.1     01-06    TPro  6.2  /  Alb  2.3[L]  /  TBili  0.9  /  DBili  x   /  AST  10[L]  /  ALT  23  /  AlkPhos  115  01-04      Urinalysis Basic - ( 06 Jan 2025 06:50 )    Color: x / Appearance: x / SG: x / pH: x  Gluc: 139 mg/dL / Ketone: x  / Bili: x / Urobili: x   Blood: x / Protein: x / Nitrite: x   Leuk Esterase: x / RBC: x / WBC x   Sq Epi: x / Non Sq Epi: x / Bacteria: x  
Date of Service: 01-06-25 @ 13:43    Patient is a 39y old  Male who presents with a chief complaint of abdominal pain, diarrhea (06 Jan 2025 10:20)      INTERVAL HPI/OVERNIGHT EVENTS: Patient seen and examined. NAD. Feeling much better.    Vital Signs Last 24 Hrs  T(C): 36.7 (06 Jan 2025 11:28), Max: 36.8 (05 Jan 2025 20:35)  T(F): 98.1 (06 Jan 2025 11:28), Max: 98.3 (05 Jan 2025 20:35)  HR: 71 (06 Jan 2025 11:28) (71 - 75)  BP: 115/73 (06 Jan 2025 11:28) (111/74 - 118/76)  BP(mean): --  RR: 18 (06 Jan 2025 11:28) (18 - 18)  SpO2: 100% (06 Jan 2025 11:28) (98% - 100%)    Parameters below as of 06 Jan 2025 11:28  Patient On (Oxygen Delivery Method): room air        01-06    139  |  107  |  6[L]  ----------------------------<  139[H]  4.2   |  26  |  0.79    Ca    9.0      06 Jan 2025 06:50  Mg     2.1     01-06                            8.5    12.31 )-----------( 524      ( 06 Jan 2025 06:50 )             28.1       CAPILLARY BLOOD GLUCOSE        Urinalysis Basic - ( 06 Jan 2025 06:50 )    Color: x / Appearance: x / SG: x / pH: x  Gluc: 139 mg/dL / Ketone: x  / Bili: x / Urobili: x   Blood: x / Protein: x / Nitrite: x   Leuk Esterase: x / RBC: x / WBC x   Sq Epi: x / Non Sq Epi: x / Bacteria: x              acetaminophen     Tablet .. 650 milliGRAM(s) Oral every 6 hours PRN  aluminum hydroxide/magnesium hydroxide/simethicone Suspension 30 milliLiter(s) Oral every 4 hours PRN  melatonin 3 milliGRAM(s) Oral at bedtime PRN  mesalamine ER Capsule 1000 milliGRAM(s) Oral daily  ondansetron Injectable 4 milliGRAM(s) IV Push every 6 hours PRN  piperacillin/tazobactam IVPB.. 3.375 Gram(s) IV Intermittent every 8 hours  predniSONE   Tablet 40 milliGRAM(s) Oral daily              REVIEW OF SYSTEMS:  CONSTITUTIONAL: No fever, no weight loss, or no fatigue  NECK: No pain, no stiffness  RESPIRATORY: No cough, no wheezing, no chills, no hemoptysis, No shortness of breath  CARDIOVASCULAR: No chest pain, no palpitations, no dizziness, no leg swelling  GASTROINTESTINAL: No abdominal pain. No nausea, no vomiting, no hematemesis; No diarrhea, no constipation. No melena, no hematochezia.  GENITOURINARY: No dysuria, no frequency, no hematuria, no incontinence  NEUROLOGICAL: No headaches, no loss of strength, no numbness, no tremors  SKIN: No itching, no burning  MUSCULOSKELETAL: No joint pain, no swelling; No muscle, no back, no extremity pain  PSYCHIATRIC: No depression, no mood swings,   HEME/LYMPH: No easy bruising, no bleeding gums  ALLERY AND IMMUNOLOGIC: No hives       Consultant(s) Notes Reviewed:  [X] YES  [ ] NO    PHYSICAL EXAM:  GENERAL: NAD  HEAD:  Atraumatic, Normocephalic  EYES: EOMI, PERRLA, conjunctiva and sclera clear  ENMT: No tonsillar erythema, exudates, or enlargement; Moist mucous membranes  NECK: Supple, No JVD  NERVOUS SYSTEM:  Awake & alert  CHEST/LUNG: Clear to auscultation bilaterally; No rales, rhonchi, wheezing,  HEART: Regular rate and rhythm  ABDOMEN: Soft, Nontender, Nondistended; Bowel sounds present  EXTREMITIES:  No clubbing, cyanosis, or edema  LYMPH: No lymphadenopathy noted  SKIN: No rashes      Advanced care planning discussed with patient/family [X] YES   [ ] NO    Advanced care planning discussed with patient/family. Patient's health status was discussed. All appropriate changes have been made regarding patient's end-of-life care. Advanced care planning forms reviewed/discussed/completed.  20 minutes spent.   
Date of Service: 25 @ 12:50    Patient is a 39y old  Male who presents with a chief complaint of abdominal pain, diarrhea (2025 16:43)      INTERVAL HPI/OVERNIGHT EVENTS: Patient seen and examined. NAD. + diarrhea    Vital Signs Last 24 Hrs  T(C): 36.9 (2025 07:50), Max: 38.3 (2025 00:16)  T(F): 98.4 (2025 07:50), Max: 101 (2025 00:16)  HR: 81 (2025 07:50) (77 - 113)  BP: 122/78 (2025 07:50) (114/79 - 125/75)  BP(mean): --  RR: 17 (2025 07:50) (16 - 19)  SpO2: 100% (2025 07:50) (97% - 100%)    Parameters below as of 2025 06:00  Patient On (Oxygen Delivery Method): room air            132[L]  |  100  |  9   ----------------------------<  111[H]  4.1   |  24  |  1.00    Ca    9.3      2025 15:00    TPro  7.8  /  Alb  2.9[L]  /  TBili  1.5[H]  /  DBili  x   /  AST  19  /  ALT  27  /  AlkPhos  148[H]                            8.8    12.34 )-----------( 394      ( 2025 12:30 )             28.6     PT/INR - ( 2025 15:00 )   PT: 13.3 sec;   INR: 1.14 ratio         PTT - ( 2025 15:00 )  PTT:29.2 sec  CAPILLARY BLOOD GLUCOSE        Urinalysis Basic - ( 2025 00:24 )    Color: Yellow / Appearance: Clear / S.085 / pH: x  Gluc: x / Ketone: 15 mg/dL  / Bili: Negative / Urobili: 1.0 mg/dL   Blood: x / Protein: 30 mg/dL / Nitrite: Negative   Leuk Esterase: Negative / RBC: 0-2 /HPF / WBC 0-2 /HPF   Sq Epi: x / Non Sq Epi: x / Bacteria: Occasional /HPF      C. difficile GDH & toxins A/B by EIA (25 @ 07:00)   Clostridium difficile GDH Toxins A&B, EIA: Negative    Rotavirus A: Detected (25 @ 00:25)         acetaminophen     Tablet .. 650 milliGRAM(s) Oral every 6 hours PRN  aluminum hydroxide/magnesium hydroxide/simethicone Suspension 30 milliLiter(s) Oral every 4 hours PRN  melatonin 3 milliGRAM(s) Oral at bedtime PRN  mesalamine ER Capsule 1000 milliGRAM(s) Oral daily  ondansetron Injectable 4 milliGRAM(s) IV Push every 6 hours PRN  piperacillin/tazobactam IVPB.. 3.375 Gram(s) IV Intermittent every 8 hours  sodium chloride 0.9%. 1000 milliLiter(s) IV Continuous <Continuous>              REVIEW OF SYSTEMS:  CONSTITUTIONAL: No fever, no weight loss, or no fatigue  NECK: No pain, no stiffness  RESPIRATORY: No cough, no wheezing, no chills, no hemoptysis, No shortness of breath  CARDIOVASCULAR: No chest pain, no palpitations, no dizziness, no leg swelling  GASTROINTESTINAL: No abdominal pain. No nausea, no vomiting, no hematemesis; + diarrhea, no constipation. No melena, no hematochezia.  GENITOURINARY: No dysuria, no frequency, no hematuria, no incontinence  NEUROLOGICAL: No headaches, no loss of strength, no numbness, no tremors  SKIN: No itching, no burning  MUSCULOSKELETAL: No joint pain, no swelling; No muscle, no back, no extremity pain  PSYCHIATRIC: No depression, no mood swings,   HEME/LYMPH: No easy bruising, no bleeding gums  ALLERY AND IMMUNOLOGIC: No hives       Consultant(s) Notes Reviewed:  [X] YES  [ ] NO    PHYSICAL EXAM:  GENERAL: NAD  HEAD:  Atraumatic, Normocephalic  EYES: EOMI, PERRLA, conjunctiva and sclera clear  ENMT: No tonsillar erythema, exudates, or enlargement; Moist mucous membranes  NECK: Supple, No JVD  NERVOUS SYSTEM:  Awake & alert  CHEST/LUNG: Clear to auscultation bilaterally; No rales, rhonchi, wheezing,  HEART: Regular rate and rhythm  ABDOMEN: Soft, Nontender, Nondistended; Bowel sounds present  EXTREMITIES:  No clubbing, cyanosis, or edema  LYMPH: No lymphadenopathy noted  SKIN: No rashes      Advanced care planning discussed with patient/family [X] YES   [ ] NO    Advanced care planning discussed with patient/family. Patient's health status was discussed. All appropriate changes have been made regarding patient's end-of-life care. Advanced care planning forms reviewed/discussed/completed.  20 minutes spent.

## 2025-01-16 NOTE — PATIENT PROFILE ADULT - DIETITIAN.
[Maximal Pain Intensity: 0/10] : 0/10 [Least Pain Intensity: 0/10] : 0/10 [100: Normal, no complaints, no evidence of disease.] : 100: Normal, no complaints, no evidence of disease. [90: Able to carry normal activity; minor signs or symptoms of disease.] : 90: Able to carry normal activity; minor signs or symptoms of disease.  dietitian/nutrition services

## 2025-03-03 NOTE — PROGRESS NOTE ADULT - ASSESSMENT
----- Message from Brandon sent at 3/3/2025  8:26 AM CST -----  Regarding: Ava  Type:Patient Callback Who called: Ava What is the request in detail: Patient stated that she would like for the doctor to know that the Trulicity is not on the list for her insurance and they do no want to pay for it. She needs for the doctor to call [employer] at [phone]. She needs for the doctor to speak with them and let them know why the Trulicity will be the better medication than what the insurance is offering. Please reach out to [employer] and reach out to the patient once done. Can the clinic reply by [facility]? Yes Would the patient rather a call back or a response via [facility]? Callback Best call back number: [phone] Information:   diarrhea  abdominal pain fevers    pan colitis on CT   ?infectious vs ?inflammatory vs ?ischemic    PLAN  IBD labs pending  stool studies ecoli.norovirus  bacid one tab tid  low residue lactose free diet  if stool negative consider imodium  continue abx  continue IVF  monitor CBC  replete electrolytes as needed   Will eventually need outpatient colonoscopy.   will follow  ? d./c plaaning if stable  Advanced care planning was discussed with patient and family.  Advanced care planning forms were reviewed and discussed.  Risks, benefits and alternatives of gastroenterologic procedures were discussed in detail and all questions were answered.    30 minutes spent.

## 2025-07-31 NOTE — ED PROVIDER NOTE - PSYCHIATRIC MOOD
State Medicaid will not allow coverage outside of state, if they are telling you that there are no oral and maxillofacial surgery providers in the state that accept her insurance and that will not be an option.  We could potentially try referral to ENT?  Would patient like to try this?   appropriate

## (undated) DEVICE — SYR LUER LOK 30CC

## (undated) DEVICE — CANISTER SUCTION 1200CC 10/SL

## (undated) DEVICE — ELCTR GROUNDING PAD ADULT COVIDIEN

## (undated) DEVICE — TUBE O2 SUPL CRUSH RESIS CONN SOUTHSIDE ONLY

## (undated) DEVICE — SOL IRR POUR H2O 1000ML

## (undated) DEVICE — SET IV PUMP BLOOD 1VALVE 180FILTER NON-DEHP

## (undated) DEVICE — SUT HEWSON RETRIEVER

## (undated) DEVICE — NDL INJ SCLERO INTERJECT 23G

## (undated) DEVICE — TUBE RECTAL 24FR

## (undated) DEVICE — TUBING IV SET SECONDARY 34"

## (undated) DEVICE — CATH ELCTR GLIDE PRB 7FR

## (undated) DEVICE — BRUSH CYTO ENDO

## (undated) DEVICE — DRSG CURITY GAUZE SPONGE 4 X 4" 12-PLY

## (undated) DEVICE — TUBING SUCTION CONN 6FT STERILE

## (undated) DEVICE — CATH IV SAFE BC 20G X 1.16" (PINK)

## (undated) DEVICE — FORMALIN CUPS 10% BUFFERED

## (undated) DEVICE — FORCEP RADIAL JAW 4 W NDL 2.2MM 2.8MM 160CM YELLOW DISP

## (undated) DEVICE — POLY TRAP ETRAP

## (undated) DEVICE — SOL IRR NS 0.9% 250ML

## (undated) DEVICE — ENDOCUFF VISION SZ 3 SM PRPL

## (undated) DEVICE — MARKER ENDO SPOT EX

## (undated) DEVICE — FORCEP RADIAL JAW 4 JUMBO 2.8MM 3.2MM 240CM ORANGE DISP

## (undated) DEVICE — STERIS DEFENDO 3-PIECE KIT (AIR/WATER, SUCTION & BIOPSY VALVES)

## (undated) DEVICE — SYR LUER SLIP TIP 50CC

## (undated) DEVICE — BRUSH COLONOSCOPY CYTOLOGY

## (undated) DEVICE — CLAMP BX HOT RAD JAW 3

## (undated) DEVICE — TUBING IV SET SECOND 34" W/O LOK-BLUNT

## (undated) DEVICE — SUCTION YANKAUER TAPERED BULBOUS NO VENT

## (undated) DEVICE — MASK LRG MED AND HIGH O2 CONC M TO M 10FT

## (undated) DEVICE — VALVE BIOPSY

## (undated) DEVICE — GLV 8 PROTEXIS (WHITE)

## (undated) DEVICE — SYR LUER SLIP TIP 30CC

## (undated) DEVICE — FORCEP RADIAL JAW 4 W NDL 2.4MM 2.8MM 240CM ORANGE DISP

## (undated) DEVICE — TRAP SPECIMEN SPUTUM 40CC

## (undated) DEVICE — SNARE LRG

## (undated) DEVICE — CATH ELECHMSTAT  INJ 7FR 210CM

## (undated) DEVICE — PACK IV START WITH CHG

## (undated) DEVICE — SENSOR O2 FINGER XL ADULT 24/BX 6BX/CA

## (undated) DEVICE — BITE BLOCK MAXI RUBBER STAMP

## (undated) DEVICE — SOL IRR POUR NS 0.9% 1000ML

## (undated) DEVICE — SYR IV POSIFLUSH NS 3ML 30/TY

## (undated) DEVICE — TRAP QUICK CATCH  SINGL CHAMBER

## (undated) DEVICE — Device

## (undated) DEVICE — ENDOCUFF VISION SZ 2 LG GRN

## (undated) DEVICE — SNARE POLYP SENS 27MM 240CM

## (undated) DEVICE — TUBING CANNULA SALTER LABS NASAL ADULT 7FT

## (undated) DEVICE — RETRIEVER ROTH NET PLATINUM-UNIVERSAL

## (undated) DEVICE — SOL IRR POUR H2O 500ML

## (undated) DEVICE — TUBING IV SET GRAVITY 3Y 100" MACRO

## (undated) DEVICE — SOL IRR BAG H2O 1000ML

## (undated) DEVICE — CATH IV SAFE BC 22G X 1" (BLUE)

## (undated) DEVICE — MASK O2 NON REBREATH 3IN1 ADULT